# Patient Record
Sex: FEMALE | Race: WHITE | NOT HISPANIC OR LATINO | Employment: FULL TIME | URBAN - METROPOLITAN AREA
[De-identification: names, ages, dates, MRNs, and addresses within clinical notes are randomized per-mention and may not be internally consistent; named-entity substitution may affect disease eponyms.]

---

## 2019-06-24 LAB
HUMAN PAPILLOMAVIRUS (HPV): NORMAL
PAP RECOMMENDATION EXT: NORMAL
PAP SMEAR: NORMAL

## 2019-11-25 LAB — BCS RECOMMENDATION EXT: NORMAL

## 2020-10-21 ENCOUNTER — OFFICE VISIT (OUTPATIENT)
Dept: URGENT CARE | Facility: CLINIC | Age: 55
End: 2020-10-21
Payer: COMMERCIAL

## 2020-10-21 VITALS
SYSTOLIC BLOOD PRESSURE: 118 MMHG | OXYGEN SATURATION: 97 % | HEART RATE: 96 BPM | DIASTOLIC BLOOD PRESSURE: 70 MMHG | WEIGHT: 110 LBS | HEIGHT: 66 IN | TEMPERATURE: 99 F | RESPIRATION RATE: 20 BRPM | BODY MASS INDEX: 17.68 KG/M2

## 2020-10-21 DIAGNOSIS — J18.9 PNEUMONIA DUE TO INFECTIOUS ORGANISM, UNSPECIFIED LATERALITY, UNSPECIFIED PART OF LUNG: ICD-10-CM

## 2020-10-21 DIAGNOSIS — R06.02 SOB (SHORTNESS OF BREATH): Primary | ICD-10-CM

## 2020-10-21 LAB
CTP QC/QA: YES
SARS-COV-2 RDRP RESP QL NAA+PROBE: NEGATIVE

## 2020-10-21 PROCEDURE — U0002: ICD-10-PCS | Mod: QW,S$GLB,, | Performed by: PHYSICIAN ASSISTANT

## 2020-10-21 PROCEDURE — 99203 PR OFFICE/OUTPT VISIT, NEW, LEVL III, 30-44 MIN: ICD-10-PCS | Mod: S$GLB,,, | Performed by: PHYSICIAN ASSISTANT

## 2020-10-21 PROCEDURE — 71046 XR CHEST PA AND LATERAL: ICD-10-PCS | Mod: S$GLB,,, | Performed by: RADIOLOGY

## 2020-10-21 PROCEDURE — U0002 COVID-19 LAB TEST NON-CDC: HCPCS | Mod: QW,S$GLB,, | Performed by: PHYSICIAN ASSISTANT

## 2020-10-21 PROCEDURE — 71046 X-RAY EXAM CHEST 2 VIEWS: CPT | Mod: S$GLB,,, | Performed by: RADIOLOGY

## 2020-10-21 PROCEDURE — 99203 OFFICE O/P NEW LOW 30 MIN: CPT | Mod: S$GLB,,, | Performed by: PHYSICIAN ASSISTANT

## 2020-10-21 RX ORDER — FLUTICASONE PROPIONATE AND SALMETEROL XINAFOATE 230; 21 UG/1; UG/1
2 AEROSOL, METERED RESPIRATORY (INHALATION) 2 TIMES DAILY
COMMUNITY
Start: 2020-09-13 | End: 2022-05-19

## 2020-10-21 RX ORDER — ALBUTEROL SULFATE 90 UG/1
AEROSOL, METERED RESPIRATORY (INHALATION)
COMMUNITY
Start: 2020-08-01 | End: 2021-11-03

## 2020-10-21 RX ORDER — MONTELUKAST SODIUM 10 MG/1
10 TABLET ORAL NIGHTLY
COMMUNITY
Start: 2020-10-20

## 2020-10-21 RX ORDER — ALBUTEROL SULFATE 0.83 MG/ML
SOLUTION RESPIRATORY (INHALATION)
COMMUNITY
Start: 2020-08-19

## 2020-10-21 RX ORDER — NEBULIZER AND COMPRESSOR
EACH MISCELLANEOUS
COMMUNITY
Start: 2020-08-20

## 2020-10-21 RX ORDER — IMMUNE GLOBULIN SUBCUTANEOUS (HUMAN) 200 MG/ML
INJECTION, SOLUTION SUBCUTANEOUS
COMMUNITY

## 2020-10-21 RX ORDER — NORETHINDRONE AND ETHINYL ESTRADIOL 1 MG-35MCG
KIT ORAL
COMMUNITY
Start: 2020-10-08 | End: 2021-07-29

## 2020-10-21 RX ORDER — EPINEPHRINE 0.15 MG/.3ML
0.15 INJECTION INTRAMUSCULAR
COMMUNITY

## 2020-10-21 RX ORDER — FLUTICASONE PROPIONATE 50 MCG
SPRAY, SUSPENSION (ML) NASAL
COMMUNITY
Start: 2020-07-15 | End: 2022-05-19

## 2020-10-21 NOTE — PATIENT INSTRUCTIONS
Shortness of Breath (Dyspnea)  Shortness of breath is the feeling that you can't catch your breath or get enough air. It is also known as dyspnea.  Dyspnea can be caused by many different conditions. They include:  · Acute asthma attack.  · Worsening of chronic lung diseases such as chronic bronchitis and emphysema.  · Heart failure. This is when weak heart muscle allows extra fluid to collect in the lungs.  · Panic attacks or anxiety. Fear can cause rapid breathing (hyperventilation).  · Pneumonia, or an infection in the lung tissue.  · Exposure to toxic substances, fumes, smoke, or certain medicines.  · Blood clot in the lung (pulmonary embolism). This is often from a piece of blood clot in a deep vein of the leg (deep vein thrombosis) that breaks off and travels to the lungs.  · Heart attack or heart-related chest pain (angina).  · Anemia.  · Collapsed lung (pneumothorax).  · Dehydration.  · Pregnancy.  Based on your visit today, the exact cause of your shortness of breath is not certain. Your tests dont show any of the serious causes of dyspnea. You may need other tests to find out if you have a serious problem. Its important to watch for any new symptoms or symptoms that get worse. Follow up with your healthcare provider as directed.  Home care  Follow these tips to take care of yourself at home:  · When your symptoms are better, go back to your usual activities.  · If you smoke, you should stop. Join a quit-smoking program or ask your healthcare provider for help.  · Eat a healthy diet and get plenty of sleep.  · Get regular exercise. Talk with your healthcare provider before starting to exercise, especially if you have other medical problems.  · Cut down on the amount of caffeine and stimulants you consume.  Follow-up care  Follow up with your healthcare provider, or as advised.  If tests were done, you will be told if your treatment needs to be changed. You can call as directed for the results.  (Note:  If an X-ray was taken, a specialist will review it. You will be notified of any new findings that may affect your care.)  Call 911 or get immediate medical care  Shortness of breath may be a sign of a serious medical problem. For example, it may be a problem with your heart or lungs. Call 911 if you have worsening shortness of breath or trouble breathing, especially with any of the symptoms below:  · You are confused or its difficult to wake you.  · You faint or lose consciousness.  · You have a fast heartbeat, or your heartbeat is irregular.  · You are coughing up blood.  · You have pain in your chest, arm, shoulder, neck, or upper back.  · You break out in a sweat.  When to seek medical advice  Call your healthcare provider right away if any of these occur:  · Slight shortness of breath or wheezing  · Redness, pain or swelling in your leg, arm, or other body area  · Swelling in both legs or ankles  · Fast weight gain  · Dizziness or weakness  · Fever of 100.4ºF (38ºC) or higher, or as directed by your healthcare provider  Date Last Reviewed: 9/13/2015  © 6399-0339 [employer]. [address] [place] [address]. All rights reserved. This information is not intended as a substitute for professional medical care. Always follow your healthcare professional's instructions.        What is Pneumonia?    Pneumonia is a serious lung infection. Many cases of pneumonia are caused by bacteria or viruses. Fungi may also cause pneumonia, but this is less common.  You may also get pneumonia after another illness, such as a cold, flu, or bronchitis. Those most at risk include older adults, smokers, and people with long-term (chronic) health problems or weak immune systems.  Healthy lungs  · Air travels in and out of the lungs through tubes called airways.  · The tubes branch into smaller passages called bronchioles. These end in tiny sacs called alveoli.  · Blood vessels surrounding the alveoli take oxygen  into the bloodstream. At the same time, the alveoli remove carbon dioxide (a waste gas) from the blood. The carbon dioxide is then exhaled.  When you have pneumonia  · Pneumonia causes the bronchioles and the alveoli to fill with excess mucus and become inflamed.  · Your bodys response may be to cough. This can help clear out the fluid.  · The fluid (or mucus) you cough up may appear green or dark yellow.  · The excess mucus may make you feel short of breath.  · The inflammation and infection may give you a fever.  What are the symptoms?  Symptoms of pneumonia can come without warning. At first, you may think you have a cold or flu. But symptoms may get worse quickly, turning into pneumonia. Symptoms can be different for bacterial and viral pneumonia. Common symptoms may include the following:  · Severe cough with green or yellow mucus that doesn't improve or that gets worse  · Fever and chills  · Nausea, vomiting or diarrhea  · Shortness of breath with normal daily activities  · Increased heart rate  · Chest pain or discomfort when breathing in or coughing  · Headache  · Excessive sweating and clammy skin  Date Last Reviewed: 12/1/2016  © 3469-6992 [employer]. [address] [place] [address]. All rights reserved. This information is not intended as a substitute for professional medical care. Always follow your healthcare professional's instructions.      Please follow up with your Primary care provider within 2-5 days if your signs and symptoms have not resolved or worsen.     If your condition worsens or fails to improve we recommend that you receive another evaluation at the emergency room immediately or contact your primary medical clinic to discuss your concerns.   You must understand that you have received an Urgent Care treatment only and that you may be released before all of your medical problems are known or treated. You, the patient, will arrange for follow up care as instructed.      RED FLAGS/WARNING SYMPTOMS DISCUSSED WITH PATIENT THAT WOULD WARRANT EMERGENT MEDICAL ATTENTION. PATIENT VERBALIZED UNDERSTANDING.

## 2020-10-21 NOTE — PROGRESS NOTES
"Subjective:       Patient ID: Corrie Calix is a 55 y.o. female.    Vitals:  height is 5' 6" (1.676 m) and weight is 49.9 kg (110 lb). Her temperature is 98.5 °F (36.9 °C). Her blood pressure is 118/70 and her pulse is 96. Her respiration is 20 and oxygen saturation is 97%.     Chief Complaint: Shortness of Breath    Currently Treating pneumonia with doxycyline and levaquin as well as steroids. Got concerned with mild Sob and muscle aches today. Wants to rule out COVID. Reports significant improvement in pneumonia.     Shortness of Breath  This is a new problem. The current episode started 1 to 4 weeks ago (3 weeks). The problem occurs constantly. The problem has been unchanged. Associated symptoms include sputum production. Pertinent negatives include no abdominal pain, chest pain, claudication, coryza, ear pain, fever, headaches, hemoptysis, leg pain, leg swelling, neck pain, orthopnea, PND, rash, rhinorrhea, sore throat, swollen glands, syncope, vomiting or wheezing. The symptoms are aggravated by any activity. She has tried steroid inhalers (antibiotics) for the symptoms. The treatment provided significant relief. Her past medical history is significant for asthma and pneumonia.       Constitution: Positive for fatigue. Negative for chills, sweating and fever.   HENT: Negative for ear pain, congestion, sinus pain, sinus pressure, sore throat and voice change.    Neck: Negative for neck pain and painful lymph nodes.   Cardiovascular: Negative for chest pain, leg swelling and passing out.   Eyes: Negative for eye redness.   Respiratory: Positive for cough, sputum production and shortness of breath. Negative for chest tightness, bloody sputum, COPD, stridor, wheezing and asthma.    Gastrointestinal: Negative for abdominal pain, nausea and vomiting.   Musculoskeletal: Negative for muscle ache.   Skin: Negative for rash.   Allergic/Immunologic: Negative for seasonal allergies and asthma.   Neurological: Negative " for headaches.   Hematologic/Lymphatic: Negative for swollen lymph nodes.       Objective:      Physical Exam   Constitutional: She is oriented to person, place, and time. She appears well-developed. She is cooperative.  Non-toxic appearance. She does not appear ill. No distress.   HENT:   Head: Normocephalic and atraumatic.   Ears:   Right Ear: Hearing, tympanic membrane, external ear and ear canal normal.   Left Ear: Hearing, tympanic membrane, external ear and ear canal normal.   Nose: Nose normal. No mucosal edema, rhinorrhea or nasal deformity. No epistaxis. Right sinus exhibits no maxillary sinus tenderness and no frontal sinus tenderness. Left sinus exhibits no maxillary sinus tenderness and no frontal sinus tenderness.   Mouth/Throat: Uvula is midline, oropharynx is clear and moist and mucous membranes are normal. No trismus in the jaw. Normal dentition. No uvula swelling. No oropharyngeal exudate, posterior oropharyngeal edema or posterior oropharyngeal erythema.   Eyes: Conjunctivae and lids are normal. No scleral icterus.   Neck: Trachea normal, full passive range of motion without pain and phonation normal. Neck supple. No neck rigidity. No edema and no erythema present.   Cardiovascular: Normal rate, regular rhythm, normal heart sounds and normal pulses.   Pulmonary/Chest: Effort normal and breath sounds normal. No accessory muscle usage or stridor. No respiratory distress. She has no decreased breath sounds. She has no wheezes. She has no rhonchi. She has no rales. She exhibits no tenderness and no bony tenderness.   Abdominal: Normal appearance.   Musculoskeletal: Normal range of motion.         General: No deformity.   Lymphadenopathy:        Head (right side): No submental, no submandibular, no tonsillar, no preauricular and no posterior auricular adenopathy present.        Head (left side): No submental, no submandibular, no tonsillar, no preauricular and no posterior auricular adenopathy present.      She has no cervical adenopathy.        Right cervical: No superficial cervical and no deep cervical adenopathy present.       Left cervical: No superficial cervical and no deep cervical adenopathy present.   Neurological: She is alert and oriented to person, place, and time. She exhibits normal muscle tone. Coordination normal.   Skin: Skin is warm, dry, intact, not diaphoretic and not pale. Psychiatric: Her speech is normal and behavior is normal. Judgment and thought content normal.   Nursing note and vitals reviewed.        Assessment:       1. SOB (shortness of breath)    2. Pneumonia due to infectious organism, unspecified laterality, unspecified part of lung        Plan:         SOB (shortness of breath)  -     POCT COVID-19 Rapid Screening  -     X-Ray Chest PA And Lateral; Future; Expected date: 10/21/2020    Pneumonia due to infectious organism, unspecified laterality, unspecified part of lung      Reviewed lab results and radiographs with patient. Advised to follow up with her PCP. Discussed diagnosis with patient as well as treatment and home care. Discussed return to clinic precautions vs ER precautions. All patients questions answered. Patient verbalized understanding. Patient agreed with plan of care.         Shortness of Breath (Dyspnea)  Shortness of breath is the feeling that you can't catch your breath or get enough air. It is also known as dyspnea.  Dyspnea can be caused by many different conditions. They include:  · Acute asthma attack.  · Worsening of chronic lung diseases such as chronic bronchitis and emphysema.  · Heart failure. This is when weak heart muscle allows extra fluid to collect in the lungs.  · Panic attacks or anxiety. Fear can cause rapid breathing (hyperventilation).  · Pneumonia, or an infection in the lung tissue.  · Exposure to toxic substances, fumes, smoke, or certain medicines.  · Blood clot in the lung (pulmonary embolism). This is often from a piece of blood clot in  a deep vein of the leg (deep vein thrombosis) that breaks off and travels to the lungs.  · Heart attack or heart-related chest pain (angina).  · Anemia.  · Collapsed lung (pneumothorax).  · Dehydration.  · Pregnancy.  Based on your visit today, the exact cause of your shortness of breath is not certain. Your tests dont show any of the serious causes of dyspnea. You may need other tests to find out if you have a serious problem. Its important to watch for any new symptoms or symptoms that get worse. Follow up with your healthcare provider as directed.  Home care  Follow these tips to take care of yourself at home:  · When your symptoms are better, go back to your usual activities.  · If you smoke, you should stop. Join a quit-smoking program or ask your healthcare provider for help.  · Eat a healthy diet and get plenty of sleep.  · Get regular exercise. Talk with your healthcare provider before starting to exercise, especially if you have other medical problems.  · Cut down on the amount of caffeine and stimulants you consume.  Follow-up care  Follow up with your healthcare provider, or as advised.  If tests were done, you will be told if your treatment needs to be changed. You can call as directed for the results.  (Note: If an X-ray was taken, a specialist will review it. You will be notified of any new findings that may affect your care.)  Call 911 or get immediate medical care  Shortness of breath may be a sign of a serious medical problem. For example, it may be a problem with your heart or lungs. Call 911 if you have worsening shortness of breath or trouble breathing, especially with any of the symptoms below:  · You are confused or its difficult to wake you.  · You faint or lose consciousness.  · You have a fast heartbeat, or your heartbeat is irregular.  · You are coughing up blood.  · You have pain in your chest, arm, shoulder, neck, or upper back.  · You break out in a sweat.  When to seek medical  advice  Call your healthcare provider right away if any of these occur:  · Slight shortness of breath or wheezing  · Redness, pain or swelling in your leg, arm, or other body area  · Swelling in both legs or ankles  · Fast weight gain  · Dizziness or weakness  · Fever of 100.4ºF (38ºC) or higher, or as directed by your healthcare provider  Date Last Reviewed: 9/13/2015  © 3287-3921 [employer]. [address] [place] [address], [place] [address]. All rights reserved. This information is not intended as a substitute for professional medical care. Always follow your healthcare professional's instructions.        What is Pneumonia?    Pneumonia is a serious lung infection. Many cases of pneumonia are caused by bacteria or viruses. Fungi may also cause pneumonia, but this is less common.  You may also get pneumonia after another illness, such as a cold, flu, or bronchitis. Those most at risk include older adults, smokers, and people with long-term (chronic) health problems or weak immune systems.  Healthy lungs  · Air travels in and out of the lungs through tubes called airways.  · The tubes branch into smaller passages called bronchioles. These end in tiny sacs called alveoli.  · Blood vessels surrounding the alveoli take oxygen into the bloodstream. At the same time, the alveoli remove carbon dioxide (a waste gas) from the blood. The carbon dioxide is then exhaled.  When you have pneumonia  · Pneumonia causes the bronchioles and the alveoli to fill with excess mucus and become inflamed.  · Your bodys response may be to cough. This can help clear out the fluid.  · The fluid (or mucus) you cough up may appear green or dark yellow.  · The excess mucus may make you feel short of breath.  · The inflammation and infection may give you a fever.  What are the symptoms?  Symptoms of pneumonia can come without warning. At first, you may think you have a cold or flu. But symptoms may get worse quickly, turning into  pneumonia. Symptoms can be different for bacterial and viral pneumonia. Common symptoms may include the following:  · Severe cough with green or yellow mucus that doesn't improve or that gets worse  · Fever and chills  · Nausea, vomiting or diarrhea  · Shortness of breath with normal daily activities  · Increased heart rate  · Chest pain or discomfort when breathing in or coughing  · Headache  · Excessive sweating and clammy skin  Date Last Reviewed: 12/1/2016  © 7771-9050 [employer]. [address] [place] [address]. All rights reserved. This information is not intended as a substitute for professional medical care. Always follow your healthcare professional's instructions.      Please follow up with your Primary care provider within 2-5 days if your signs and symptoms have not resolved or worsen.     If your condition worsens or fails to improve we recommend that you receive another evaluation at the emergency room immediately or contact your primary medical clinic to discuss your concerns.   You must understand that you have received an Urgent Care treatment only and that you may be released before all of your medical problems are known or treated. You, the patient, will arrange for follow up care as instructed.     RED FLAGS/WARNING SYMPTOMS DISCUSSED WITH PATIENT THAT WOULD WARRANT EMERGENT MEDICAL ATTENTION. PATIENT VERBALIZED UNDERSTANDING.

## 2021-04-23 ENCOUNTER — TELEPHONE (OUTPATIENT)
Dept: INFECTIOUS DISEASES | Facility: CLINIC | Age: 56
End: 2021-04-23

## 2021-05-06 ENCOUNTER — LAB VISIT (OUTPATIENT)
Dept: LAB | Facility: HOSPITAL | Age: 56
End: 2021-05-06
Attending: PHYSICIAN ASSISTANT
Payer: COMMERCIAL

## 2021-05-06 ENCOUNTER — OFFICE VISIT (OUTPATIENT)
Dept: INFECTIOUS DISEASES | Facility: CLINIC | Age: 56
End: 2021-05-06
Payer: COMMERCIAL

## 2021-05-06 VITALS
SYSTOLIC BLOOD PRESSURE: 129 MMHG | BODY MASS INDEX: 18.49 KG/M2 | HEART RATE: 80 BPM | HEIGHT: 66 IN | TEMPERATURE: 98 F | DIASTOLIC BLOOD PRESSURE: 68 MMHG | WEIGHT: 115.06 LBS

## 2021-05-06 DIAGNOSIS — R05.9 COUGH: ICD-10-CM

## 2021-05-06 DIAGNOSIS — R05.3 CHRONIC COUGH: Primary | ICD-10-CM

## 2021-05-06 DIAGNOSIS — R05.3 CHRONIC COUGH: ICD-10-CM

## 2021-05-06 DIAGNOSIS — J18.9 PNEUMONIA, UNSPECIFIED ORGANISM: ICD-10-CM

## 2021-05-06 PROCEDURE — 87206 SMEAR FLUORESCENT/ACID STAI: CPT | Performed by: PHYSICIAN ASSISTANT

## 2021-05-06 PROCEDURE — 87116 MYCOBACTERIA CULTURE: CPT | Performed by: PHYSICIAN ASSISTANT

## 2021-05-06 PROCEDURE — 99204 PR OFFICE/OUTPT VISIT, NEW, LEVL IV, 45-59 MIN: ICD-10-PCS | Mod: S$GLB,,, | Performed by: PHYSICIAN ASSISTANT

## 2021-05-06 PROCEDURE — 87186 SC STD MICRODIL/AGAR DIL: CPT | Performed by: PHYSICIAN ASSISTANT

## 2021-05-06 PROCEDURE — 99204 OFFICE O/P NEW MOD 45 MIN: CPT | Mod: S$GLB,,, | Performed by: PHYSICIAN ASSISTANT

## 2021-05-06 PROCEDURE — 3008F BODY MASS INDEX DOCD: CPT | Mod: CPTII,S$GLB,, | Performed by: PHYSICIAN ASSISTANT

## 2021-05-06 PROCEDURE — 87077 CULTURE AEROBIC IDENTIFY: CPT | Performed by: PHYSICIAN ASSISTANT

## 2021-05-06 PROCEDURE — 99999 PR PBB SHADOW E&M-EST. PATIENT-LVL IV: ICD-10-PCS | Mod: PBBFAC,,, | Performed by: PHYSICIAN ASSISTANT

## 2021-05-06 PROCEDURE — 1126F PR PAIN SEVERITY QUANTIFIED, NO PAIN PRESENT: ICD-10-PCS | Mod: S$GLB,,, | Performed by: PHYSICIAN ASSISTANT

## 2021-05-06 PROCEDURE — 87556 M.TUBERCULO DNA AMP PROBE: CPT | Performed by: PHYSICIAN ASSISTANT

## 2021-05-06 PROCEDURE — 87205 SMEAR GRAM STAIN: CPT | Performed by: PHYSICIAN ASSISTANT

## 2021-05-06 PROCEDURE — 1126F AMNT PAIN NOTED NONE PRSNT: CPT | Mod: S$GLB,,, | Performed by: PHYSICIAN ASSISTANT

## 2021-05-06 PROCEDURE — 87015 SPECIMEN INFECT AGNT CONCNTJ: CPT | Performed by: PHYSICIAN ASSISTANT

## 2021-05-06 PROCEDURE — 99999 PR PBB SHADOW E&M-EST. PATIENT-LVL IV: CPT | Mod: PBBFAC,,, | Performed by: PHYSICIAN ASSISTANT

## 2021-05-06 PROCEDURE — 3008F PR BODY MASS INDEX (BMI) DOCUMENTED: ICD-10-PCS | Mod: CPTII,S$GLB,, | Performed by: PHYSICIAN ASSISTANT

## 2021-05-06 PROCEDURE — 87070 CULTURE OTHR SPECIMN AEROBIC: CPT | Performed by: PHYSICIAN ASSISTANT

## 2021-05-07 ENCOUNTER — HOSPITAL ENCOUNTER (OUTPATIENT)
Dept: RADIOLOGY | Facility: OTHER | Age: 56
Discharge: HOME | End: 2021-05-07
Attending: PHYSICIAN ASSISTANT
Payer: COMMERCIAL

## 2021-05-07 DIAGNOSIS — R05.9 COUGH: ICD-10-CM

## 2021-05-07 DIAGNOSIS — J18.9 PNEUMONIA, UNSPECIFIED ORGANISM: ICD-10-CM

## 2021-05-07 DIAGNOSIS — R05.3 CHRONIC COUGH: ICD-10-CM

## 2021-05-07 PROCEDURE — 71250 CT CHEST WITHOUT CONTRAST: ICD-10-PCS | Mod: 26,,, | Performed by: RADIOLOGY

## 2021-05-07 PROCEDURE — 71250 CT THORAX DX C-: CPT | Mod: 26,,, | Performed by: RADIOLOGY

## 2021-05-07 PROCEDURE — 71250 CT THORAX DX C-: CPT | Mod: TC

## 2021-05-08 LAB
BACTERIA SPEC AEROBE CULT: ABNORMAL
BACTERIA SPEC AEROBE CULT: ABNORMAL
GRAM STN SPEC: ABNORMAL

## 2021-05-10 ENCOUNTER — PATIENT MESSAGE (OUTPATIENT)
Dept: INFECTIOUS DISEASES | Facility: CLINIC | Age: 56
End: 2021-05-10

## 2021-05-10 DIAGNOSIS — R05.3 CHRONIC COUGH: Primary | ICD-10-CM

## 2021-05-10 LAB
M TB CMPLX DNA SPEC QL NAA+PROBE: NEGATIVE
SPECIMEN SOURCE: NORMAL

## 2021-05-11 ENCOUNTER — PATIENT MESSAGE (OUTPATIENT)
Dept: INFECTIOUS DISEASES | Facility: CLINIC | Age: 56
End: 2021-05-11

## 2021-05-11 ENCOUNTER — TELEPHONE (OUTPATIENT)
Dept: INFECTIOUS DISEASES | Facility: CLINIC | Age: 56
End: 2021-05-11

## 2021-05-11 DIAGNOSIS — J15.1 PNEUMONIA DUE TO PSEUDOMONAS SPECIES, UNSPECIFIED LATERALITY, UNSPECIFIED PART OF LUNG: Primary | ICD-10-CM

## 2021-05-11 RX ORDER — CIPROFLOXACIN 750 MG/1
750 TABLET, FILM COATED ORAL EVERY 12 HOURS
Qty: 14 TABLET | Refills: 0 | Status: SHIPPED | OUTPATIENT
Start: 2021-05-11 | End: 2021-05-19 | Stop reason: SDUPTHER

## 2021-05-12 ENCOUNTER — LAB VISIT (OUTPATIENT)
Dept: LAB | Facility: HOSPITAL | Age: 56
End: 2021-05-12
Attending: PHYSICIAN ASSISTANT
Payer: COMMERCIAL

## 2021-05-12 DIAGNOSIS — R05.3 CHRONIC COUGH: ICD-10-CM

## 2021-05-12 LAB — HIV 1+2 AB+HIV1 P24 AG SERPL QL IA: NEGATIVE

## 2021-05-12 PROCEDURE — 86703 HIV-1/HIV-2 1 RESULT ANTBDY: CPT | Performed by: PHYSICIAN ASSISTANT

## 2021-05-12 PROCEDURE — 86682 HELMINTH ANTIBODY: CPT | Performed by: PHYSICIAN ASSISTANT

## 2021-05-13 LAB — STRONGYLOIDES ANTIBODY IGG: NEGATIVE

## 2021-05-19 DIAGNOSIS — J15.1 PNEUMONIA DUE TO PSEUDOMONAS SPECIES, UNSPECIFIED LATERALITY, UNSPECIFIED PART OF LUNG: ICD-10-CM

## 2021-05-19 RX ORDER — CIPROFLOXACIN 750 MG/1
750 TABLET, FILM COATED ORAL EVERY 12 HOURS
Qty: 14 TABLET | Refills: 0 | Status: SHIPPED | OUTPATIENT
Start: 2021-05-19 | End: 2021-05-26

## 2021-05-24 ENCOUNTER — PATIENT MESSAGE (OUTPATIENT)
Dept: INFECTIOUS DISEASES | Facility: CLINIC | Age: 56
End: 2021-05-24

## 2021-05-26 DIAGNOSIS — B37.9 YEAST INFECTION: Primary | ICD-10-CM

## 2021-05-26 RX ORDER — FLUCONAZOLE 200 MG/1
200 TABLET ORAL DAILY
Qty: 7 TABLET | Refills: 1 | Status: SHIPPED | OUTPATIENT
Start: 2021-05-26 | End: 2021-06-02

## 2021-06-08 ENCOUNTER — PATIENT MESSAGE (OUTPATIENT)
Dept: INFECTIOUS DISEASES | Facility: CLINIC | Age: 56
End: 2021-06-08

## 2021-06-11 ENCOUNTER — TELEPHONE (OUTPATIENT)
Dept: PULMONOLOGY | Facility: CLINIC | Age: 56
End: 2021-06-11

## 2021-06-11 DIAGNOSIS — R05.3 CHRONIC COUGH: Primary | ICD-10-CM

## 2021-06-14 ENCOUNTER — LAB VISIT (OUTPATIENT)
Dept: LAB | Facility: HOSPITAL | Age: 56
End: 2021-06-14
Attending: PHYSICIAN ASSISTANT
Payer: COMMERCIAL

## 2021-06-14 DIAGNOSIS — R05.3 CHRONIC COUGH: ICD-10-CM

## 2021-06-14 LAB
ALBUMIN SERPL BCP-MCNC: 3.5 G/DL (ref 3.5–5.2)
ALP SERPL-CCNC: 42 U/L (ref 55–135)
ALT SERPL W/O P-5'-P-CCNC: 21 U/L (ref 10–44)
ANION GAP SERPL CALC-SCNC: 7 MMOL/L (ref 8–16)
AST SERPL-CCNC: 31 U/L (ref 10–40)
BASOPHILS # BLD AUTO: 0.02 K/UL (ref 0–0.2)
BASOPHILS NFR BLD: 0.3 % (ref 0–1.9)
BILIRUB SERPL-MCNC: 1 MG/DL (ref 0.1–1)
BUN SERPL-MCNC: 15 MG/DL (ref 6–20)
CALCIUM SERPL-MCNC: 8.9 MG/DL (ref 8.7–10.5)
CHLORIDE SERPL-SCNC: 107 MMOL/L (ref 95–110)
CO2 SERPL-SCNC: 25 MMOL/L (ref 23–29)
CREAT SERPL-MCNC: 0.8 MG/DL (ref 0.5–1.4)
CRP SERPL-MCNC: 4.73 MG/L (ref 0–3.19)
DIFFERENTIAL METHOD: ABNORMAL
EOSINOPHIL # BLD AUTO: 0.1 K/UL (ref 0–0.5)
EOSINOPHIL NFR BLD: 1.2 % (ref 0–8)
ERYTHROCYTE [DISTWIDTH] IN BLOOD BY AUTOMATED COUNT: 13.3 % (ref 11.5–14.5)
ERYTHROCYTE [SEDIMENTATION RATE] IN BLOOD BY WESTERGREN METHOD: 3 MM/HR (ref 0–36)
EST. GFR  (AFRICAN AMERICAN): >60 ML/MIN/1.73 M^2
EST. GFR  (NON AFRICAN AMERICAN): >60 ML/MIN/1.73 M^2
GLUCOSE SERPL-MCNC: 98 MG/DL (ref 70–110)
HCT VFR BLD AUTO: 39.5 % (ref 37–48.5)
HGB BLD-MCNC: 12.8 G/DL (ref 12–16)
IMM GRANULOCYTES # BLD AUTO: 0.01 K/UL (ref 0–0.04)
IMM GRANULOCYTES NFR BLD AUTO: 0.2 % (ref 0–0.5)
LYMPHOCYTES # BLD AUTO: 1.8 K/UL (ref 1–4.8)
LYMPHOCYTES NFR BLD: 30.2 % (ref 18–48)
MCH RBC QN AUTO: 31.7 PG (ref 27–31)
MCHC RBC AUTO-ENTMCNC: 32.4 G/DL (ref 32–36)
MCV RBC AUTO: 98 FL (ref 82–98)
MONOCYTES # BLD AUTO: 0.5 K/UL (ref 0.3–1)
MONOCYTES NFR BLD: 8.9 % (ref 4–15)
NEUTROPHILS # BLD AUTO: 3.5 K/UL (ref 1.8–7.7)
NEUTROPHILS NFR BLD: 59.2 % (ref 38–73)
NRBC BLD-RTO: 0 /100 WBC
PLATELET # BLD AUTO: 159 K/UL (ref 150–450)
PMV BLD AUTO: 12.5 FL (ref 9.2–12.9)
POTASSIUM SERPL-SCNC: 4 MMOL/L (ref 3.5–5.1)
PROT SERPL-MCNC: 7 G/DL (ref 6–8.4)
RBC # BLD AUTO: 4.04 M/UL (ref 4–5.4)
SODIUM SERPL-SCNC: 139 MMOL/L (ref 136–145)
WBC # BLD AUTO: 5.83 K/UL (ref 3.9–12.7)

## 2021-06-14 PROCEDURE — 85652 RBC SED RATE AUTOMATED: CPT | Performed by: PHYSICIAN ASSISTANT

## 2021-06-14 PROCEDURE — 85025 COMPLETE CBC W/AUTO DIFF WBC: CPT | Performed by: PHYSICIAN ASSISTANT

## 2021-06-14 PROCEDURE — 86141 C-REACTIVE PROTEIN HS: CPT | Performed by: PHYSICIAN ASSISTANT

## 2021-06-14 PROCEDURE — 36415 COLL VENOUS BLD VENIPUNCTURE: CPT | Performed by: PHYSICIAN ASSISTANT

## 2021-06-14 PROCEDURE — 80053 COMPREHEN METABOLIC PANEL: CPT | Performed by: PHYSICIAN ASSISTANT

## 2021-06-21 ENCOUNTER — HOSPITAL ENCOUNTER (OUTPATIENT)
Dept: PULMONOLOGY | Facility: CLINIC | Age: 56
Discharge: HOME | End: 2021-06-21
Payer: COMMERCIAL

## 2021-06-21 ENCOUNTER — OFFICE VISIT (OUTPATIENT)
Dept: PULMONOLOGY | Facility: CLINIC | Age: 56
End: 2021-06-21
Payer: COMMERCIAL

## 2021-06-21 VITALS
OXYGEN SATURATION: 98 % | DIASTOLIC BLOOD PRESSURE: 62 MMHG | HEART RATE: 89 BPM | HEIGHT: 66 IN | WEIGHT: 114.88 LBS | BODY MASS INDEX: 18.46 KG/M2 | SYSTOLIC BLOOD PRESSURE: 130 MMHG

## 2021-06-21 DIAGNOSIS — R05.3 CHRONIC COUGH: ICD-10-CM

## 2021-06-21 DIAGNOSIS — J18.1 LUNG CONSOLIDATION: ICD-10-CM

## 2021-06-21 DIAGNOSIS — J18.9 PNEUMONIA, UNSPECIFIED ORGANISM: ICD-10-CM

## 2021-06-21 PROCEDURE — 3008F BODY MASS INDEX DOCD: CPT | Mod: CPTII,S$GLB,, | Performed by: INTERNAL MEDICINE

## 2021-06-21 PROCEDURE — 99204 PR OFFICE/OUTPT VISIT, NEW, LEVL IV, 45-59 MIN: ICD-10-PCS | Mod: S$GLB,,, | Performed by: INTERNAL MEDICINE

## 2021-06-21 PROCEDURE — 1126F PR PAIN SEVERITY QUANTIFIED, NO PAIN PRESENT: ICD-10-PCS | Mod: S$GLB,,, | Performed by: INTERNAL MEDICINE

## 2021-06-21 PROCEDURE — 94729 PR C02/MEMBANE DIFFUSE CAPACITY: ICD-10-PCS | Mod: S$GLB,,, | Performed by: INTERNAL MEDICINE

## 2021-06-21 PROCEDURE — 1126F AMNT PAIN NOTED NONE PRSNT: CPT | Mod: S$GLB,,, | Performed by: INTERNAL MEDICINE

## 2021-06-21 PROCEDURE — 99999 PR PBB SHADOW E&M-EST. PATIENT-LVL IV: CPT | Mod: PBBFAC,,, | Performed by: INTERNAL MEDICINE

## 2021-06-21 PROCEDURE — 94060 EVALUATION OF WHEEZING: CPT | Mod: S$GLB,,, | Performed by: INTERNAL MEDICINE

## 2021-06-21 PROCEDURE — 99999 PR PBB SHADOW E&M-EST. PATIENT-LVL IV: ICD-10-PCS | Mod: PBBFAC,,, | Performed by: INTERNAL MEDICINE

## 2021-06-21 PROCEDURE — 94729 DIFFUSING CAPACITY: CPT | Mod: S$GLB,,, | Performed by: INTERNAL MEDICINE

## 2021-06-21 PROCEDURE — 94060 PR EVAL OF BRONCHOSPASM: ICD-10-PCS | Mod: S$GLB,,, | Performed by: INTERNAL MEDICINE

## 2021-06-21 PROCEDURE — 99204 OFFICE O/P NEW MOD 45 MIN: CPT | Mod: S$GLB,,, | Performed by: INTERNAL MEDICINE

## 2021-06-21 PROCEDURE — 94727 PR PULM FUNCTION TEST BY GAS: ICD-10-PCS | Mod: S$GLB,,, | Performed by: INTERNAL MEDICINE

## 2021-06-21 PROCEDURE — 3008F PR BODY MASS INDEX (BMI) DOCUMENTED: ICD-10-PCS | Mod: CPTII,S$GLB,, | Performed by: INTERNAL MEDICINE

## 2021-06-21 PROCEDURE — 94727 GAS DIL/WSHOT DETER LNG VOL: CPT | Mod: S$GLB,,, | Performed by: INTERNAL MEDICINE

## 2021-06-21 RX ORDER — DUPILUMAB 300 MG/2ML
300 INJECTION, SOLUTION SUBCUTANEOUS
COMMUNITY

## 2021-06-23 ENCOUNTER — HOSPITAL ENCOUNTER (OUTPATIENT)
Dept: RADIOLOGY | Facility: HOSPITAL | Age: 56
Discharge: HOME | End: 2021-06-23
Attending: INTERNAL MEDICINE
Payer: COMMERCIAL

## 2021-06-23 DIAGNOSIS — J18.9 PNEUMONIA, UNSPECIFIED ORGANISM: ICD-10-CM

## 2021-06-23 PROCEDURE — 71250 CT THORAX DX C-: CPT | Mod: 26,,, | Performed by: RADIOLOGY

## 2021-06-23 PROCEDURE — 71250 CT CHEST WITHOUT CONTRAST: ICD-10-PCS | Mod: 26,,, | Performed by: RADIOLOGY

## 2021-06-23 PROCEDURE — 71250 CT THORAX DX C-: CPT | Mod: TC

## 2021-06-25 DIAGNOSIS — J18.1 LUNG CONSOLIDATION: Primary | ICD-10-CM

## 2021-06-28 ENCOUNTER — TELEPHONE (OUTPATIENT)
Dept: PULMONOLOGY | Facility: CLINIC | Age: 56
End: 2021-06-28

## 2021-06-29 ENCOUNTER — HOSPITAL ENCOUNTER (OUTPATIENT)
Facility: HOSPITAL | Age: 56
Discharge: HOME | End: 2021-06-29
Attending: INTERNAL MEDICINE | Admitting: INTERNAL MEDICINE
Payer: COMMERCIAL

## 2021-06-29 VITALS
SYSTOLIC BLOOD PRESSURE: 135 MMHG | HEIGHT: 66 IN | WEIGHT: 114 LBS | DIASTOLIC BLOOD PRESSURE: 65 MMHG | HEART RATE: 79 BPM | TEMPERATURE: 98 F | BODY MASS INDEX: 18.32 KG/M2 | OXYGEN SATURATION: 96 % | RESPIRATION RATE: 21 BRPM

## 2021-06-29 DIAGNOSIS — J18.1 LUNG CONSOLIDATION: ICD-10-CM

## 2021-06-29 DIAGNOSIS — R05.3 CHRONIC COUGH: Primary | ICD-10-CM

## 2021-06-29 DIAGNOSIS — Z01.818 PRE-PROCEDURAL EXAMINATION: ICD-10-CM

## 2021-06-29 PROCEDURE — 87206 SMEAR FLUORESCENT/ACID STAI: CPT | Performed by: INTERNAL MEDICINE

## 2021-06-29 PROCEDURE — 87107 FUNGI IDENTIFICATION MOLD: CPT | Performed by: INTERNAL MEDICINE

## 2021-06-29 PROCEDURE — 63600175 PHARM REV CODE 636 W HCPCS: Performed by: INTERNAL MEDICINE

## 2021-06-29 PROCEDURE — 99152 MOD SED SAME PHYS/QHP 5/>YRS: CPT | Performed by: INTERNAL MEDICINE

## 2021-06-29 PROCEDURE — 25000003 PHARM REV CODE 250: Performed by: INTERNAL MEDICINE

## 2021-06-29 PROCEDURE — 87070 CULTURE OTHR SPECIMN AEROBIC: CPT | Performed by: INTERNAL MEDICINE

## 2021-06-29 PROCEDURE — 87116 MYCOBACTERIA CULTURE: CPT | Performed by: INTERNAL MEDICINE

## 2021-06-29 PROCEDURE — 31622 DX BRONCHOSCOPE/WASH: CPT | Performed by: INTERNAL MEDICINE

## 2021-06-29 PROCEDURE — 87186 SC STD MICRODIL/AGAR DIL: CPT | Performed by: INTERNAL MEDICINE

## 2021-06-29 PROCEDURE — 87205 SMEAR GRAM STAIN: CPT | Performed by: INTERNAL MEDICINE

## 2021-06-29 PROCEDURE — 99153 MOD SED SAME PHYS/QHP EA: CPT | Performed by: INTERNAL MEDICINE

## 2021-06-29 PROCEDURE — 31624 DX BRONCHOSCOPE/LAVAGE: CPT | Performed by: INTERNAL MEDICINE

## 2021-06-29 PROCEDURE — 87015 SPECIMEN INFECT AGNT CONCNTJ: CPT | Performed by: INTERNAL MEDICINE

## 2021-06-29 PROCEDURE — 87102 FUNGUS ISOLATION CULTURE: CPT | Performed by: INTERNAL MEDICINE

## 2021-06-29 PROCEDURE — 87077 CULTURE AEROBIC IDENTIFY: CPT | Performed by: INTERNAL MEDICINE

## 2021-06-29 RX ORDER — LIDOCAINE HYDROCHLORIDE 10 MG/ML
INJECTION INFILTRATION; PERINEURAL CODE/TRAUMA/SEDATION MEDICATION
Status: COMPLETED | OUTPATIENT
Start: 2021-06-29 | End: 2021-06-29

## 2021-06-29 RX ORDER — FENTANYL CITRATE 50 UG/ML
INJECTION, SOLUTION INTRAMUSCULAR; INTRAVENOUS CODE/TRAUMA/SEDATION MEDICATION
Status: COMPLETED | OUTPATIENT
Start: 2021-06-29 | End: 2021-06-29

## 2021-06-29 RX ORDER — CETIRIZINE HYDROCHLORIDE 10 MG/1
10 TABLET ORAL DAILY
COMMUNITY

## 2021-06-29 RX ORDER — LIDOCAINE HYDROCHLORIDE 20 MG/ML
INJECTION, SOLUTION INFILTRATION; PERINEURAL CODE/TRAUMA/SEDATION MEDICATION
Status: COMPLETED | OUTPATIENT
Start: 2021-06-29 | End: 2021-06-29

## 2021-06-29 RX ORDER — MIDAZOLAM HYDROCHLORIDE 5 MG/ML
INJECTION INTRAMUSCULAR; INTRAVENOUS CODE/TRAUMA/SEDATION MEDICATION
Status: COMPLETED | OUTPATIENT
Start: 2021-06-29 | End: 2021-06-29

## 2021-06-29 RX ADMIN — FENTANYL CITRATE 25 MCG: 50 INJECTION, SOLUTION INTRAMUSCULAR; INTRAVENOUS at 10:06

## 2021-06-29 RX ADMIN — LIDOCAINE HYDROCHLORIDE 8 ML: 10 INJECTION, SOLUTION INFILTRATION; PERINEURAL at 10:06

## 2021-06-29 RX ADMIN — MIDAZOLAM 1 MG: 5 INJECTION INTRAMUSCULAR; INTRAVENOUS at 10:06

## 2021-06-29 RX ADMIN — LIDOCAINE HYDROCHLORIDE 6 ML: 20 INJECTION, SOLUTION INFILTRATION; PERINEURAL at 10:06

## 2021-06-29 RX ADMIN — TOPICAL ANESTHETIC 0.5 ML: 200 SPRAY DENTAL; PERIODONTAL at 10:06

## 2021-06-29 RX ADMIN — MIDAZOLAM 2 MG: 5 INJECTION INTRAMUSCULAR; INTRAVENOUS at 10:06

## 2021-07-02 LAB
BACTERIA SPEC AEROBE CULT: ABNORMAL
BACTERIA SPEC AEROBE CULT: ABNORMAL
GRAM STN SPEC: ABNORMAL

## 2021-07-03 RX ORDER — CIPROFLOXACIN 500 MG/1
500 TABLET ORAL EVERY 12 HOURS
Qty: 14 TABLET | Refills: 0 | Status: SHIPPED | OUTPATIENT
Start: 2021-07-03 | End: 2021-07-29 | Stop reason: ALTCHOICE

## 2021-07-07 ENCOUNTER — OFFICE VISIT (OUTPATIENT)
Dept: OTOLARYNGOLOGY | Facility: CLINIC | Age: 56
End: 2021-07-07
Payer: COMMERCIAL

## 2021-07-07 VITALS — WEIGHT: 112.19 LBS | HEIGHT: 67 IN | BODY MASS INDEX: 17.61 KG/M2

## 2021-07-07 DIAGNOSIS — J45.40 MODERATE PERSISTENT ASTHMA WITHOUT COMPLICATION: ICD-10-CM

## 2021-07-07 DIAGNOSIS — J30.89 PERENNIAL ALLERGIC RHINITIS WITH SEASONAL VARIATION: Primary | ICD-10-CM

## 2021-07-07 DIAGNOSIS — J32.8 OTHER CHRONIC SINUSITIS: ICD-10-CM

## 2021-07-07 DIAGNOSIS — D80.6 ANTI-POLYSACCHARIDE ANTIBODY DEFICIENCY: ICD-10-CM

## 2021-07-07 DIAGNOSIS — J30.2 PERENNIAL ALLERGIC RHINITIS WITH SEASONAL VARIATION: Primary | ICD-10-CM

## 2021-07-07 PROCEDURE — 99203 PR OFFICE/OUTPT VISIT, NEW, LEVL III, 30-44 MIN: ICD-10-PCS | Mod: 25,S$GLB,, | Performed by: OTOLARYNGOLOGY

## 2021-07-07 PROCEDURE — 99999 PR PBB SHADOW E&M-EST. PATIENT-LVL IV: CPT | Mod: PBBFAC,,, | Performed by: OTOLARYNGOLOGY

## 2021-07-07 PROCEDURE — 1126F PR PAIN SEVERITY QUANTIFIED, NO PAIN PRESENT: ICD-10-PCS | Mod: S$GLB,,, | Performed by: OTOLARYNGOLOGY

## 2021-07-07 PROCEDURE — 3008F BODY MASS INDEX DOCD: CPT | Mod: CPTII,S$GLB,, | Performed by: OTOLARYNGOLOGY

## 2021-07-07 PROCEDURE — 31231 NASAL ENDOSCOPY DX: CPT | Mod: S$GLB,,, | Performed by: OTOLARYNGOLOGY

## 2021-07-07 PROCEDURE — 99999 PR PBB SHADOW E&M-EST. PATIENT-LVL IV: ICD-10-PCS | Mod: PBBFAC,,, | Performed by: OTOLARYNGOLOGY

## 2021-07-07 PROCEDURE — 99203 OFFICE O/P NEW LOW 30 MIN: CPT | Mod: 25,S$GLB,, | Performed by: OTOLARYNGOLOGY

## 2021-07-07 PROCEDURE — 31231 NASAL/SINUS ENDOSCOPY: ICD-10-PCS | Mod: S$GLB,,, | Performed by: OTOLARYNGOLOGY

## 2021-07-07 PROCEDURE — 1126F AMNT PAIN NOTED NONE PRSNT: CPT | Mod: S$GLB,,, | Performed by: OTOLARYNGOLOGY

## 2021-07-07 PROCEDURE — 3008F PR BODY MASS INDEX (BMI) DOCUMENTED: ICD-10-PCS | Mod: CPTII,S$GLB,, | Performed by: OTOLARYNGOLOGY

## 2021-07-08 LAB
ACID FAST MOD KINY STN SPEC: NORMAL
MYCOBACTERIUM SPEC QL CULT: NORMAL

## 2021-07-09 ENCOUNTER — PATIENT MESSAGE (OUTPATIENT)
Dept: INFECTIOUS DISEASES | Facility: CLINIC | Age: 56
End: 2021-07-09

## 2021-07-16 ENCOUNTER — SPECIALTY PHARMACY (OUTPATIENT)
Dept: PHARMACY | Facility: CLINIC | Age: 56
End: 2021-07-16

## 2021-07-16 ENCOUNTER — OFFICE VISIT (OUTPATIENT)
Dept: INFECTIOUS DISEASES | Facility: CLINIC | Age: 56
End: 2021-07-16
Payer: COMMERCIAL

## 2021-07-16 VITALS
WEIGHT: 114 LBS | HEIGHT: 67 IN | HEART RATE: 79 BPM | SYSTOLIC BLOOD PRESSURE: 130 MMHG | DIASTOLIC BLOOD PRESSURE: 75 MMHG | BODY MASS INDEX: 17.89 KG/M2 | TEMPERATURE: 98 F

## 2021-07-16 DIAGNOSIS — J47.9 ADULT BRONCHIECTASIS: Primary | ICD-10-CM

## 2021-07-16 PROCEDURE — 3008F PR BODY MASS INDEX (BMI) DOCUMENTED: ICD-10-PCS | Mod: CPTII,S$GLB,, | Performed by: INTERNAL MEDICINE

## 2021-07-16 PROCEDURE — 99215 OFFICE O/P EST HI 40 MIN: CPT | Mod: S$GLB,,, | Performed by: INTERNAL MEDICINE

## 2021-07-16 PROCEDURE — 99999 PR PBB SHADOW E&M-EST. PATIENT-LVL IV: CPT | Mod: PBBFAC,,, | Performed by: INTERNAL MEDICINE

## 2021-07-16 PROCEDURE — 99999 PR PBB SHADOW E&M-EST. PATIENT-LVL IV: ICD-10-PCS | Mod: PBBFAC,,, | Performed by: INTERNAL MEDICINE

## 2021-07-16 PROCEDURE — 99417 PROLNG OP E/M EACH 15 MIN: CPT | Mod: S$GLB,,, | Performed by: INTERNAL MEDICINE

## 2021-07-16 PROCEDURE — 99417 PR PROLONGED SVC, OUTPT, W/WO DIRECT PT CONTACT,  EA ADDTL 15 MIN: ICD-10-PCS | Mod: S$GLB,,, | Performed by: INTERNAL MEDICINE

## 2021-07-16 PROCEDURE — 99215 PR OFFICE/OUTPT VISIT, EST, LEVL V, 40-54 MIN: ICD-10-PCS | Mod: S$GLB,,, | Performed by: INTERNAL MEDICINE

## 2021-07-16 PROCEDURE — 1126F PR PAIN SEVERITY QUANTIFIED, NO PAIN PRESENT: ICD-10-PCS | Mod: S$GLB,,, | Performed by: INTERNAL MEDICINE

## 2021-07-16 PROCEDURE — 1126F AMNT PAIN NOTED NONE PRSNT: CPT | Mod: S$GLB,,, | Performed by: INTERNAL MEDICINE

## 2021-07-16 PROCEDURE — 3008F BODY MASS INDEX DOCD: CPT | Mod: CPTII,S$GLB,, | Performed by: INTERNAL MEDICINE

## 2021-07-16 RX ORDER — TOBRAMYCIN INHALATION SOLUTION 300 MG/5ML
300 INHALANT RESPIRATORY (INHALATION) EVERY 12 HOURS
Qty: 300 ML | Refills: 11 | Status: SHIPPED | OUTPATIENT
Start: 2021-07-16 | End: 2021-09-15

## 2021-07-29 ENCOUNTER — SPECIALTY PHARMACY (OUTPATIENT)
Dept: PHARMACY | Facility: CLINIC | Age: 56
End: 2021-07-29

## 2021-07-29 DIAGNOSIS — J47.9 ADULT BRONCHIECTASIS: Primary | ICD-10-CM

## 2021-08-02 ENCOUNTER — PATIENT MESSAGE (OUTPATIENT)
Dept: INFECTIOUS DISEASES | Facility: CLINIC | Age: 56
End: 2021-08-02

## 2021-08-02 LAB
FUNGUS SPEC CULT: ABNORMAL
FUNGUS SPEC CULT: ABNORMAL

## 2021-08-09 LAB
ACID FAST MOD KINY STN SPEC: NORMAL
MYCOBACTERIUM SPEC QL CULT: NORMAL

## 2021-08-10 ENCOUNTER — PATIENT MESSAGE (OUTPATIENT)
Dept: INFECTIOUS DISEASES | Facility: CLINIC | Age: 56
End: 2021-08-10

## 2021-08-10 DIAGNOSIS — J47.9 ADULT BRONCHIECTASIS: Primary | ICD-10-CM

## 2021-08-11 RX ORDER — SODIUM CHLORIDE FOR INHALATION 3 %
4 VIAL, NEBULIZER (ML) INHALATION 2 TIMES DAILY
Qty: 240 ML | Refills: 3 | Status: SHIPPED | OUTPATIENT
Start: 2021-08-11 | End: 2022-04-29 | Stop reason: SDUPTHER

## 2021-08-25 ENCOUNTER — TELEPHONE (OUTPATIENT)
Dept: INFECTIOUS DISEASES | Facility: CLINIC | Age: 56
End: 2021-08-25

## 2021-08-25 ENCOUNTER — OFFICE VISIT (OUTPATIENT)
Dept: INFECTIOUS DISEASES | Facility: CLINIC | Age: 56
End: 2021-08-25
Payer: COMMERCIAL

## 2021-08-25 DIAGNOSIS — J47.9 ADULT BRONCHIECTASIS: Primary | ICD-10-CM

## 2021-08-25 PROCEDURE — 99214 PR OFFICE/OUTPT VISIT, EST, LEVL IV, 30-39 MIN: ICD-10-PCS | Mod: 95,,, | Performed by: INTERNAL MEDICINE

## 2021-08-25 PROCEDURE — 99214 OFFICE O/P EST MOD 30 MIN: CPT | Mod: 95,,, | Performed by: INTERNAL MEDICINE

## 2021-09-13 ENCOUNTER — SPECIALTY PHARMACY (OUTPATIENT)
Dept: PHARMACY | Facility: CLINIC | Age: 56
End: 2021-09-13

## 2021-09-13 ENCOUNTER — PATIENT MESSAGE (OUTPATIENT)
Dept: PULMONOLOGY | Facility: CLINIC | Age: 56
End: 2021-09-13

## 2021-09-13 ENCOUNTER — PATIENT MESSAGE (OUTPATIENT)
Dept: INFECTIOUS DISEASES | Facility: CLINIC | Age: 56
End: 2021-09-13

## 2021-09-13 DIAGNOSIS — J47.9 ADULT BRONCHIECTASIS: Primary | ICD-10-CM

## 2021-09-13 DIAGNOSIS — J47.9 ADULT BRONCHIECTASIS: ICD-10-CM

## 2021-09-15 ENCOUNTER — PATIENT MESSAGE (OUTPATIENT)
Dept: INFECTIOUS DISEASES | Facility: CLINIC | Age: 56
End: 2021-09-15

## 2021-09-15 RX ORDER — TOBRAMYCIN INHALATION SOLUTION 300 MG/5ML
300 INHALANT RESPIRATORY (INHALATION) 2 TIMES DAILY
Qty: 300 ML | Refills: 5 | Status: SHIPPED | OUTPATIENT
Start: 2021-09-15 | End: 2021-11-09

## 2021-10-01 ENCOUNTER — PATIENT MESSAGE (OUTPATIENT)
Dept: PULMONOLOGY | Facility: CLINIC | Age: 56
End: 2021-10-01

## 2021-10-04 ENCOUNTER — IMMUNIZATION (OUTPATIENT)
Dept: PHARMACY | Facility: CLINIC | Age: 56
End: 2021-10-04
Payer: COMMERCIAL

## 2021-10-04 DIAGNOSIS — Z23 NEED FOR VACCINATION: Primary | ICD-10-CM

## 2021-10-13 ENCOUNTER — PATIENT MESSAGE (OUTPATIENT)
Dept: INFECTIOUS DISEASES | Facility: CLINIC | Age: 56
End: 2021-10-13

## 2021-10-25 ENCOUNTER — PATIENT MESSAGE (OUTPATIENT)
Dept: PULMONOLOGY | Facility: CLINIC | Age: 56
End: 2021-10-25
Payer: COMMERCIAL

## 2021-11-02 ENCOUNTER — OFFICE VISIT (OUTPATIENT)
Dept: INFECTIOUS DISEASES | Facility: CLINIC | Age: 56
End: 2021-11-02
Payer: COMMERCIAL

## 2021-11-02 DIAGNOSIS — J47.9 ADULT BRONCHIECTASIS: Primary | ICD-10-CM

## 2021-11-02 PROCEDURE — 99215 OFFICE O/P EST HI 40 MIN: CPT | Mod: 95,,, | Performed by: INTERNAL MEDICINE

## 2021-11-02 PROCEDURE — 99215 PR OFFICE/OUTPT VISIT, EST, LEVL V, 40-54 MIN: ICD-10-PCS | Mod: 95,,, | Performed by: INTERNAL MEDICINE

## 2021-11-09 ENCOUNTER — SPECIALTY PHARMACY (OUTPATIENT)
Dept: PHARMACY | Facility: CLINIC | Age: 56
End: 2021-11-09
Payer: COMMERCIAL

## 2021-11-09 ENCOUNTER — PATIENT MESSAGE (OUTPATIENT)
Dept: PHARMACY | Facility: CLINIC | Age: 56
End: 2021-11-09
Payer: COMMERCIAL

## 2021-11-09 DIAGNOSIS — J47.9 ADULT BRONCHIECTASIS: Primary | ICD-10-CM

## 2021-11-09 RX ORDER — TOBRAMYCIN INHALATION SOLUTION 300 MG/5ML
300 INHALANT RESPIRATORY (INHALATION) EVERY 12 HOURS
Qty: 140 ML | Refills: 6 | Status: SHIPPED | OUTPATIENT
Start: 2021-11-09 | End: 2022-03-04 | Stop reason: SDUPTHER

## 2021-11-18 ENCOUNTER — PATIENT MESSAGE (OUTPATIENT)
Dept: PULMONOLOGY | Facility: CLINIC | Age: 56
End: 2021-11-18

## 2021-11-18 ENCOUNTER — PATIENT MESSAGE (OUTPATIENT)
Dept: PULMONOLOGY | Facility: CLINIC | Age: 56
End: 2021-11-18
Payer: COMMERCIAL

## 2021-11-22 ENCOUNTER — SPECIALTY PHARMACY (OUTPATIENT)
Dept: PHARMACY | Facility: CLINIC | Age: 56
End: 2021-11-22
Payer: COMMERCIAL

## 2021-12-02 ENCOUNTER — PATIENT MESSAGE (OUTPATIENT)
Dept: INFECTIOUS DISEASES | Facility: CLINIC | Age: 56
End: 2021-12-02
Payer: COMMERCIAL

## 2021-12-02 ENCOUNTER — PATIENT MESSAGE (OUTPATIENT)
Dept: PULMONOLOGY | Facility: CLINIC | Age: 56
End: 2021-12-02
Payer: COMMERCIAL

## 2021-12-06 ENCOUNTER — PATIENT MESSAGE (OUTPATIENT)
Dept: INFECTIOUS DISEASES | Facility: CLINIC | Age: 56
End: 2021-12-06
Payer: COMMERCIAL

## 2021-12-06 ENCOUNTER — TELEPHONE (OUTPATIENT)
Dept: PULMONOLOGY | Facility: CLINIC | Age: 56
End: 2021-12-06
Payer: COMMERCIAL

## 2021-12-07 ENCOUNTER — PATIENT MESSAGE (OUTPATIENT)
Dept: PULMONOLOGY | Facility: CLINIC | Age: 56
End: 2021-12-07
Payer: COMMERCIAL

## 2021-12-17 ENCOUNTER — SPECIALTY PHARMACY (OUTPATIENT)
Dept: PHARMACY | Facility: CLINIC | Age: 56
End: 2021-12-17
Payer: COMMERCIAL

## 2021-12-21 ENCOUNTER — PATIENT MESSAGE (OUTPATIENT)
Dept: PHARMACY | Facility: CLINIC | Age: 56
End: 2021-12-21
Payer: COMMERCIAL

## 2021-12-21 ENCOUNTER — SPECIALTY PHARMACY (OUTPATIENT)
Dept: PHARMACY | Facility: CLINIC | Age: 56
End: 2021-12-21
Payer: COMMERCIAL

## 2021-12-28 ENCOUNTER — PATIENT MESSAGE (OUTPATIENT)
Dept: PHARMACY | Facility: CLINIC | Age: 56
End: 2021-12-28
Payer: COMMERCIAL

## 2022-01-12 ENCOUNTER — PATIENT MESSAGE (OUTPATIENT)
Dept: INFECTIOUS DISEASES | Facility: CLINIC | Age: 57
End: 2022-01-12
Payer: COMMERCIAL

## 2022-01-12 DIAGNOSIS — J47.9 ADULT BRONCHIECTASIS: Primary | ICD-10-CM

## 2022-01-13 ENCOUNTER — PATIENT MESSAGE (OUTPATIENT)
Dept: INFECTIOUS DISEASES | Facility: CLINIC | Age: 57
End: 2022-01-13
Payer: COMMERCIAL

## 2022-01-27 ENCOUNTER — PATIENT MESSAGE (OUTPATIENT)
Dept: PHARMACY | Facility: CLINIC | Age: 57
End: 2022-01-27
Payer: COMMERCIAL

## 2022-01-28 ENCOUNTER — SPECIALTY PHARMACY (OUTPATIENT)
Dept: PHARMACY | Facility: CLINIC | Age: 57
End: 2022-01-28
Payer: COMMERCIAL

## 2022-01-28 ENCOUNTER — PATIENT MESSAGE (OUTPATIENT)
Dept: INFECTIOUS DISEASES | Facility: CLINIC | Age: 57
End: 2022-01-28
Payer: COMMERCIAL

## 2022-01-28 NOTE — TELEPHONE ENCOUNTER
Incoming call from patient returning [facility]'s call. Informed pt of the below information and pt grants permission to proceed with urgent appeal.

## 2022-01-28 NOTE — TELEPHONE ENCOUNTER
Received DENIAL of tobramycin inhalation solution. Plan only covers for cystic fibrosis diagnosis. Spoke to rep (Ericka call ref# [id number]) to determine next option. She informed me P2P is not an option in this case, must proceed with urgent appeal. Spoke to patient to discuss appeal process- NA, LVM to return call. Notified prescriber via secure chat. Upon pt callback, obtain pt permission to proceed with urgent appeal.

## 2022-01-28 NOTE — TELEPHONE ENCOUNTER
Patient called for refill of tobramycin. She has switched insurance. Tobramycin will now require a PA. Pt states she will need a refill by Tuesday 02/01. Routing to assigned [facility] for review.

## 2022-01-28 NOTE — LETTER
[facility]  [address]  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 The dose prescribed is inhale 300 mg (5 mL) into the lungs via nebulizer every 12 hours. Of note, this is a CONTINUATION OF THERAPY request. Her previous insurance company, [employer], had approved the medication for the same indication. [facility] has denied the initial prior authorization request ([id number]) and first-level appeal. We consider this to be a RESCISSION OF COVERAGE and are requesting an EXPEDITED, second-level external appeal.     This letter provides the clinical history, treatment rationale, and other documents that support the use of inhaled Tobramycin. Corrie Calix is a 56-year-old female with Pseudomonas pneumonia, anti-polysaccharide immune deficiency, and chronic, persistent, and productive cough with thick purulent secretions. She had two hospital admissions since 2020 for pneumonia. She was treated with IV antibiotics followed by 10-day course of Cipro, and later, a 10-day course of Levaquin. She denied resolution of chronic cough with productive sputum despite various treatments. Please see HPI of the pulmonologists note for remainder of her clinical history. She is followed closely by pulmonology, and has been evaluated by allergy, ENT, and ID.    Her cultures returned  positive with Pseudomonas in February 2021, and her respiratory culture again resulted positive for Pseudomonas on 6/29/21. Updated cultures are pending. Given her failure with past treatments, Pseudomonas colonization, and decreased lung function, she met criteria for an inhaled antibiotic. She initiated tobramycin nebulization in July 2021. She first began 4 weeks on, 4 weeks off. Frequency was later changed to 14 days on, 14 days off due to patients tolerability. Tobramycin nebulization has greatly improved her symptoms, and with the treatment she now denies any systemic symptoms and is otherwise doing well.    According to the official  labeling, TOBRAMYCIN is an aminoglycoside antibiotic that interferes with bacterial protein synthesis by binding to 30S ribosomal subunit, resulting in a defective bacterial cell membrane. Tobramycin is often used for non-cystic fibrosis bronchiectasis. Inhaled tobramycin decreases bacterial burden and decreases chronic inflammation, and therapy is crucial to combat Ms. Keith lung infection and associated symptoms. Allowing Ms. Calix to CONTINUE Tobramycin use will prevent future pulmonary infections, costs associated with potential hospitalizations, and even death. Continuation of tobramycin is currently the most clinically appropriate and medically necessary treatment. Your insurance companys denial disrupted her therapy. Please reconsider your denial to best treat my patient, and to give Ms. Calix a chance at a better quality of life with less hospitalizations and better symptom control.

## 2022-01-28 NOTE — TELEPHONE ENCOUNTER
Spoke to insurance ([employer] rep). Submitted urgent continuation of therapy PA via phone for Tobramycin 300mg/5mL #140 mL/28 DS.  [id number], estimated turnaround ~24 hours. Pending determination.

## 2022-01-31 ENCOUNTER — TELEPHONE (OUTPATIENT)
Dept: INFECTIOUS DISEASES | Facility: CLINIC | Age: 57
End: 2022-01-31
Payer: COMMERCIAL

## 2022-01-31 NOTE — TELEPHONE ENCOUNTER
----- Message from Gloria Garibay sent at 1/31/2022 12:41 PM CST -----  Regarding: pharm auth  Contact: Ortiz @ [phone] ext [phone]  Rec'd call from Ort[facility] ([facility]) calling to speak with someone in Dr. Dinero's office regarding getting the patient's prior authorization submitted for rx: tobramycin, PF, (PIETER) 300 mg/5 mL nebulizer solution.

## 2022-01-31 NOTE — TELEPHONE ENCOUNTER
10:54am Faxed urgent appeal to [facility] Appeal [phone], which was fax# provided to me. 11:55am Update: Fax to [phone] abandoned after 8 attempts.    11:55am Re-faxed to alternative [facility] fax number of [phone].   12: 07am Update: Your fax has been successfully sent to [phone] at [phone].    Pending determination.

## 2022-02-01 NOTE — TELEPHONE ENCOUNTER
Phoned [facility] and rep could not confirm an appeal was received. The rep provided more fax numbers: urgent line: [phone] and non-urgent [phone]. Faxed to URGENT line. Notified provider via secure chat [facility] is working on appeal. Will continue to follow.

## 2022-02-02 NOTE — TELEPHONE ENCOUNTER
Again called [facility] spoke to Provider Service Rep Cervantes (call ref #[id number]). She informed me it typically takes [facility] appeals 48 hours to receive a fax. I stressed the urgency of the situation in that I have called every day since Monday and have been provided a different fax number each time, and the pt has been out of medication. They could not confirm they received my appeal. She provided me another fax #[phone], which I faxed. Will continue to urgently follow. Spoke to patient via phone and updated her, she expressed understanding    11:20: Your fax has been successfully sent to [phone] at [phone].

## 2022-02-04 ENCOUNTER — TELEPHONE (OUTPATIENT)
Dept: INFECTIOUS DISEASES | Facility: CLINIC | Age: 57
End: 2022-02-04
Payer: COMMERCIAL

## 2022-02-04 DIAGNOSIS — J47.9 ADULT BRONCHIECTASIS: Primary | ICD-10-CM

## 2022-02-04 NOTE — TELEPHONE ENCOUNTER
Outreached to [facility] for a status check on tobramycin inhalation appeal. Spoke to provider  Angel V (call ref#[id number]). She informed me a decision was made on 2/2/22 and the denial was upheld based upon the criteria the pt does NOT have a CF diagnosis. Appeal case ID: [id number]. [facility] did not receive a copy of the faxed denial. Requested rep send me a copy of the determination letter and also to send back to clinical review team, as this medication is medically necessary and clinical appropriate. Rep pushed the case back to their clinical review team and informed me to check back on 2/7 for a decision.

## 2022-02-04 NOTE — TELEPHONE ENCOUNTER
Called patient and informed orders are in. She stated she will come to the lab by our office tomorrow and do sputum collection.

## 2022-02-04 NOTE — TELEPHONE ENCOUNTER
----- Message from Marisel Lott sent at 2/4/2022  2:02 PM CST -----  Regarding: Patient Advice  Contact: Pt [phone]  Patient is calling Dr. Dinero's office in regards to getting order sent over for active orders ASAP for [facility]. Please call. [phone]

## 2022-02-05 ENCOUNTER — LAB VISIT (OUTPATIENT)
Dept: LAB | Facility: HOSPITAL | Age: 57
End: 2022-02-05
Attending: INTERNAL MEDICINE
Payer: COMMERCIAL

## 2022-02-05 DIAGNOSIS — J47.9 ADULT BRONCHIECTASIS: ICD-10-CM

## 2022-02-05 PROCEDURE — 87206 SMEAR FLUORESCENT/ACID STAI: CPT | Performed by: INTERNAL MEDICINE

## 2022-02-05 PROCEDURE — 87102 FUNGUS ISOLATION CULTURE: CPT | Performed by: INTERNAL MEDICINE

## 2022-02-05 PROCEDURE — 87205 SMEAR GRAM STAIN: CPT | Performed by: INTERNAL MEDICINE

## 2022-02-05 PROCEDURE — 87015 SPECIMEN INFECT AGNT CONCNTJ: CPT | Performed by: INTERNAL MEDICINE

## 2022-02-05 PROCEDURE — 87116 MYCOBACTERIA CULTURE: CPT | Performed by: INTERNAL MEDICINE

## 2022-02-05 PROCEDURE — 87070 CULTURE OTHR SPECIMN AEROBIC: CPT | Performed by: INTERNAL MEDICINE

## 2022-02-05 PROCEDURE — 87106 FUNGI IDENTIFICATION YEAST: CPT | Performed by: INTERNAL MEDICINE

## 2022-02-07 NOTE — TELEPHONE ENCOUNTER
Outreached to [facility] for status update on urgent Olaf appeal. Spoke to Provider services advocate Emanuel BARNES(call ref#[id number]). She stated review team was still in process. Requests I call back later today. Rep is 100% sure a decision would be made by today. She confirmed my fax number to ensure a decision is faxed. Will continue to follow.

## 2022-02-08 LAB
BACTERIA SPEC AEROBE CULT: NORMAL
BACTERIA SPEC AEROBE CULT: NORMAL
GRAM STN SPEC: NORMAL

## 2022-02-08 NOTE — TELEPHONE ENCOUNTER
Faxed urgent second level appeal to [facility] at Fax: [phone] (which is fax# provided to me by rep earlier today, see previous notes). Requested a fax confirmation be sent to us upon received. Will continue to follow to ensure 2nd level appeal is received.    2/8/2022 1:29:37 PM Transmission Record- Fax successful

## 2022-02-08 NOTE — TELEPHONE ENCOUNTER
"Outreached to [facility] for status update. Spoke to Rosio KALEB (provider service rep, call ref#[id number]) states she sees "no significant notes yesterday from call ref#[id number]", and no change was made to upholding decision after clinical review team reviewed case. She referred to upheld decision letter received on 2/4/22. Will have to proceed with an expedited second-level appeal. Fax: [phone]. Left VM for patient to update. Notified provider. Will continue to follow.  "

## 2022-02-08 NOTE — TELEPHONE ENCOUNTER
Patient returned call. Informed her of the notes below. Patient verbalized understanding and expressed gratitude to [facility] Layne SIMMONS for all her help. Advised that [facility] will be in touch with any additional updates when available.

## 2022-02-10 ENCOUNTER — OFFICE VISIT (OUTPATIENT)
Dept: INFECTIOUS DISEASES | Facility: CLINIC | Age: 57
End: 2022-02-10
Payer: COMMERCIAL

## 2022-02-10 ENCOUNTER — LAB VISIT (OUTPATIENT)
Dept: LAB | Facility: HOSPITAL | Age: 57
End: 2022-02-10
Attending: INTERNAL MEDICINE
Payer: COMMERCIAL

## 2022-02-10 VITALS
BODY MASS INDEX: 16.95 KG/M2 | TEMPERATURE: 98 F | HEART RATE: 87 BPM | DIASTOLIC BLOOD PRESSURE: 78 MMHG | SYSTOLIC BLOOD PRESSURE: 135 MMHG | WEIGHT: 108 LBS | HEIGHT: 67 IN

## 2022-02-10 DIAGNOSIS — J47.9 ADULT BRONCHIECTASIS: Primary | ICD-10-CM

## 2022-02-10 DIAGNOSIS — J47.9 ADULT BRONCHIECTASIS: ICD-10-CM

## 2022-02-10 LAB
ALBUMIN SERPL BCP-MCNC: 4 G/DL (ref 3.5–5.2)
ALP SERPL-CCNC: 78 U/L (ref 55–135)
ALT SERPL W/O P-5'-P-CCNC: 23 U/L (ref 10–44)
ANION GAP SERPL CALC-SCNC: 7 MMOL/L (ref 8–16)
AST SERPL-CCNC: 26 U/L (ref 10–40)
BASOPHILS # BLD AUTO: 0.02 K/UL (ref 0–0.2)
BASOPHILS NFR BLD: 0.3 % (ref 0–1.9)
BILIRUB SERPL-MCNC: 1.1 MG/DL (ref 0.1–1)
BUN SERPL-MCNC: 11 MG/DL (ref 6–20)
CALCIUM SERPL-MCNC: 10 MG/DL (ref 8.7–10.5)
CHLORIDE SERPL-SCNC: 103 MMOL/L (ref 95–110)
CO2 SERPL-SCNC: 29 MMOL/L (ref 23–29)
CREAT SERPL-MCNC: 0.7 MG/DL (ref 0.5–1.4)
CRP SERPL-MCNC: 19.89 MG/L (ref 0–3.19)
DIFFERENTIAL METHOD: NORMAL
EOSINOPHIL # BLD AUTO: 0.1 K/UL (ref 0–0.5)
EOSINOPHIL NFR BLD: 1.2 % (ref 0–8)
ERYTHROCYTE [DISTWIDTH] IN BLOOD BY AUTOMATED COUNT: 13 % (ref 11.5–14.5)
EST. GFR  (AFRICAN AMERICAN): >60 ML/MIN/1.73 M^2
EST. GFR  (NON AFRICAN AMERICAN): >60 ML/MIN/1.73 M^2
GLUCOSE SERPL-MCNC: 102 MG/DL (ref 70–110)
HCT VFR BLD AUTO: 46 % (ref 37–48.5)
HGB BLD-MCNC: 14.7 G/DL (ref 12–16)
IMM GRANULOCYTES # BLD AUTO: 0.02 K/UL (ref 0–0.04)
IMM GRANULOCYTES NFR BLD AUTO: 0.3 % (ref 0–0.5)
LYMPHOCYTES # BLD AUTO: 1.9 K/UL (ref 1–4.8)
LYMPHOCYTES NFR BLD: 25.8 % (ref 18–48)
MCH RBC QN AUTO: 30.6 PG (ref 27–31)
MCHC RBC AUTO-ENTMCNC: 32 G/DL (ref 32–36)
MCV RBC AUTO: 96 FL (ref 82–98)
MONOCYTES # BLD AUTO: 0.6 K/UL (ref 0.3–1)
MONOCYTES NFR BLD: 8.7 % (ref 4–15)
NEUTROPHILS # BLD AUTO: 4.6 K/UL (ref 1.8–7.7)
NEUTROPHILS NFR BLD: 63.7 % (ref 38–73)
NRBC BLD-RTO: 0 /100 WBC
PLATELET # BLD AUTO: 241 K/UL (ref 150–450)
PMV BLD AUTO: 11 FL (ref 9.2–12.9)
POTASSIUM SERPL-SCNC: 4 MMOL/L (ref 3.5–5.1)
PROT SERPL-MCNC: 8 G/DL (ref 6–8.4)
RBC # BLD AUTO: 4.8 M/UL (ref 4–5.4)
SODIUM SERPL-SCNC: 139 MMOL/L (ref 136–145)
WBC # BLD AUTO: 7.22 K/UL (ref 3.9–12.7)

## 2022-02-10 PROCEDURE — 85025 COMPLETE CBC W/AUTO DIFF WBC: CPT | Performed by: INTERNAL MEDICINE

## 2022-02-10 PROCEDURE — 99215 PR OFFICE/OUTPT VISIT, EST, LEVL V, 40-54 MIN: ICD-10-PCS | Mod: S$GLB,,, | Performed by: INTERNAL MEDICINE

## 2022-02-10 PROCEDURE — 86141 C-REACTIVE PROTEIN HS: CPT | Performed by: INTERNAL MEDICINE

## 2022-02-10 PROCEDURE — 3075F PR MOST RECENT SYSTOLIC BLOOD PRESS GE 130-139MM HG: ICD-10-PCS | Mod: CPTII,S$GLB,, | Performed by: INTERNAL MEDICINE

## 2022-02-10 PROCEDURE — 1159F MED LIST DOCD IN RCRD: CPT | Mod: CPTII,S$GLB,, | Performed by: INTERNAL MEDICINE

## 2022-02-10 PROCEDURE — 3078F PR MOST RECENT DIASTOLIC BLOOD PRESSURE < 80 MM HG: ICD-10-PCS | Mod: CPTII,S$GLB,, | Performed by: INTERNAL MEDICINE

## 2022-02-10 PROCEDURE — 3075F SYST BP GE 130 - 139MM HG: CPT | Mod: CPTII,S$GLB,, | Performed by: INTERNAL MEDICINE

## 2022-02-10 PROCEDURE — 1159F PR MEDICATION LIST DOCUMENTED IN MEDICAL RECORD: ICD-10-PCS | Mod: CPTII,S$GLB,, | Performed by: INTERNAL MEDICINE

## 2022-02-10 PROCEDURE — 3078F DIAST BP <80 MM HG: CPT | Mod: CPTII,S$GLB,, | Performed by: INTERNAL MEDICINE

## 2022-02-10 PROCEDURE — 87305 ASPERGILLUS AG IA: CPT | Performed by: INTERNAL MEDICINE

## 2022-02-10 PROCEDURE — 80053 COMPREHEN METABOLIC PANEL: CPT | Performed by: INTERNAL MEDICINE

## 2022-02-10 PROCEDURE — 3008F PR BODY MASS INDEX (BMI) DOCUMENTED: ICD-10-PCS | Mod: CPTII,S$GLB,, | Performed by: INTERNAL MEDICINE

## 2022-02-10 PROCEDURE — 87449 NOS EACH ORGANISM AG IA: CPT | Performed by: INTERNAL MEDICINE

## 2022-02-10 PROCEDURE — 99215 OFFICE O/P EST HI 40 MIN: CPT | Mod: S$GLB,,, | Performed by: INTERNAL MEDICINE

## 2022-02-10 PROCEDURE — 36415 COLL VENOUS BLD VENIPUNCTURE: CPT | Performed by: INTERNAL MEDICINE

## 2022-02-10 PROCEDURE — 99999 PR PBB SHADOW E&M-EST. PATIENT-LVL III: ICD-10-PCS | Mod: PBBFAC,,, | Performed by: INTERNAL MEDICINE

## 2022-02-10 PROCEDURE — 3008F BODY MASS INDEX DOCD: CPT | Mod: CPTII,S$GLB,, | Performed by: INTERNAL MEDICINE

## 2022-02-10 PROCEDURE — 99999 PR PBB SHADOW E&M-EST. PATIENT-LVL III: CPT | Mod: PBBFAC,,, | Performed by: INTERNAL MEDICINE

## 2022-02-10 NOTE — PROGRESS NOTES
Subjective:     Patient ID: Corrie Calix is a 56 y.o. female    Chief Complaint: SOB, cough, bronchiectasis    HPI: 56F hx immunodeficiency c/b bronchiectatic lung disease w/ hx pseudomonal pneumonia presents to clinic for follow up. She has been unable to refill her inhaled tobramycin because of a change in insurance coverage. She continues to feel unwell with slowly progressive cough, sputum production, and shortness of breath that limits her activities. She states that when she was using the tobramycin nebs, she would have several days when she felt good. She has been off of them for about 10 days now. She is also having issues with insurance coverage of her cuvitru, which is still pending approval. She just recently got her dupixent refilled. Has not had follow up with pulmonary recently. Last CT was in June 2021. Recent sputum cx positive only for candida. No recent systemic antibiotics. Still having significant amount of sputum production. No recent fevers. Endorses recent skin rashes that come and go.       Immunization History   Administered Date(s) Administered    COVID-19, MRNA, LN-S, PF (MODERNA FULL 0.5 ML DOSE) 02/25/2021, 03/25/2021, 10/04/2021    Pneumococcal Conjugate - 13 Valent 08/15/2018    Tdap 11/10/2019        Review of Systems   All other systems reviewed and are negative.       Past Medical History:   Diagnosis Date    Allergy     Anti-polysaccharide antibody deficiency     Asthma     Pleurisy     Pneumothorax      Past Surgical History:   Procedure Laterality Date    BRONCHOSCOPY N/A 6/29/2021    Procedure: BRONCHOSCOPY;  Surgeon: [facility] Diagnostic Provider;  Location: [facility] OR [address] [place] [address];  Service: Anesthesiology;  Laterality: N/A;    EYE SURGERY      LUNG SURGERY       Family History   Problem Relation Age of Onset    Cancer Mother     No Known Problems Father      Social History     Tobacco Use    Smoking status: Former Smoker    Smokeless tobacco: Never Used   Substance Use  Topics    Alcohol use: Never    Drug use: Never       Objective:     Physical Exam  Constitutional:       General: She is not in acute distress.     Appearance: Normal appearance. She is well-developed. She is not ill-appearing or diaphoretic.   HENT:      Head: Normocephalic and atraumatic.      Right Ear: External ear normal.      Left Ear: External ear normal.      Nose: Nose normal.   Eyes:      General: No scleral icterus.        Right eye: No discharge.         Left eye: No discharge.      Extraocular Movements: Extraocular movements intact.      Conjunctiva/sclera: Conjunctivae normal.   Pulmonary:      Effort: Pulmonary effort is normal. No respiratory distress.      Breath sounds: No stridor. Rhonchi present.      Comments: Coarse breath sounds, productive cough  Musculoskeletal:         General: Normal range of motion.   Skin:     Findings: No erythema or rash.   Neurological:      General: No focal deficit present.      Mental Status: She is alert and oriented to person, place, and time. Mental status is at baseline.      Cranial Nerves: No cranial nerve deficit.   Psychiatric:         Mood and Affect: Mood normal.         Behavior: Behavior normal.         Thought Content: Thought content normal.         Judgment: Judgment normal.       Data:    All data, including recent labs, radiology, and pathology, has been independently reviewed.    Labs:    No results found in the last 24 hours.     Radiology:    No results found in the last 24 hours.     Assessment:    1. Adult bronchiectasis  · Will check labs today  · Pt does have a past cx + aspergillus, has not been treated for this yet. Unclear how significantly it is contributing to her symptoms .Will check serum markers. If positive, will start treatment.   · Schedule follow up w/ pulmonary for further eval, repeat PFT, imaging  · Consider referral to lung transplant service to determine if pt may be a candidate for transplant in the future  · Continue  saline nebs and chest vest for secretion management    CBC Auto Differential    Comprehensive Metabolic Panel    HIGH SENSITIVITY CRP    ASPERGILLUS (GALACTOMANNAN) AG, SERUM    Fungitell Assay For (1.3)-B-D-Glucans        Follow up in 3 months    The total time for evaluation and management services performed on 2/10/22 was greater than 40 minutes.     Patricia Dinero DO  Infectious Disease

## 2022-02-10 NOTE — TELEPHONE ENCOUNTER
Received a voicemail from Elidia at [facility] stating appeal has been upheld and a letter is to follow with more information. Notified provider. Will await determination letter for next steps.

## 2022-02-11 ENCOUNTER — PATIENT MESSAGE (OUTPATIENT)
Dept: INFECTIOUS DISEASES | Facility: CLINIC | Age: 57
End: 2022-02-11
Payer: COMMERCIAL

## 2022-02-11 DIAGNOSIS — J47.9 ADULT BRONCHIECTASIS: Primary | ICD-10-CM

## 2022-02-11 NOTE — TELEPHONE ENCOUNTER
Incoming call from a [facility] appeals rep (Stephanie), regarding another case. While on the line I asked her to look into patient Corrie's case. She informed me final internal appeal has been upheld and a physical copy of the letter would be mailed to the provider's office. She could not agree to faxing [facility] a copy. Outreached to [facility] P2P line (spoke to Lizeth WAGNER, P2P dept rep)--because we exhausted internal appeal, [facility] would not allow a P2P between Dr. Dinero and a [facility] medical director. They instead pointed me to external appeal with [place] [employer].     Spoke to Corrie who was aware as she received a copy of her appeal letter in her [facility] portal. She provided me a copy of the letter--uploaded to media.

## 2022-02-13 LAB
1,3 BETA GLUCAN SER-MCNC: 36 PG/ML
FUNGITELL COMMENTS: NEGATIVE

## 2022-02-14 ENCOUNTER — TELEPHONE (OUTPATIENT)
Dept: PULMONOLOGY | Facility: CLINIC | Age: 57
End: 2022-02-14
Payer: COMMERCIAL

## 2022-02-14 LAB — GALACTOMANNAN AG SERPL IA-ACNC: <0.5 INDEX

## 2022-02-14 NOTE — TELEPHONE ENCOUNTER
----- Message from Henrique Duran sent at 2/14/2022 11:02 AM CST -----  Regarding: Pt returning a call to Hui      Please contact the Pt, as she missed a call from Hui about appt for next week.     #  [phone]

## 2022-02-14 NOTE — TELEPHONE ENCOUNTER
LVM for patient to return my call. I was calling to schedule patient for follow up with Dr Hernadez next week. Ms Calix to call back to discuss.

## 2022-02-14 NOTE — TELEPHONE ENCOUNTER
----- Message from Stevie Hernadez MD sent at 2/11/2022  5:03 PM CST -----  I'll see her again and try to work on this. We had gotten her a vest approved but I'll see her to see if I can add anything.   Hui: Can we add her to my clinic next week.     Stevie  ----- Message -----  From: Nelsy Dinero DO  Sent: 2/11/2022  12:29 PM CST  To: MD Andrez Boykin,    Need your help with this patient. She has been slowly feeling worse, continues to have significant cough, and progressive limitations in activity due to shortness of breath. She switched insurance, and her new company will not cover her tobra nebs - specialty pharmacy has submitted a second appeal but hasn't had any luck yet. Her insurance is also not covering her immunoglobulin therapy, and just recently refilled her dupixent. Recent sputum cx only grew candida. Her high sensitivity CRP is increased to 20. She did grow an aspergillus in the past but has been somewhat hesitant to start on therapy due to potential side effects. I saw her yesterday and she is asking for other ideas to help. Not sure if she would benefit from another CT, PFT, bronch? Management of bronchiectasis is definitely outside of my expertise.     Thanks!

## 2022-02-15 NOTE — TELEPHONE ENCOUNTER
Initiated request for EXPEDITED EXTERNAL review. Faxed documentation to [phone]. Will continue to follow.

## 2022-02-21 NOTE — TELEPHONE ENCOUNTER
Called provider services line for status update, spoke to Cande (call ref [id number]) to see if [facility] received the request for an external appeal. [facility] states they have not received the fax but fax on the appeal ([phone]) that is the best fax number. I informed them I had successfully sent the fax last week (and had received successful fax confirmation), but they could not confirm receipt. Rep also did not have a phone number or contact for the department. Will attempt to fax again.

## 2022-03-04 DIAGNOSIS — J47.9 ADULT BRONCHIECTASIS: ICD-10-CM

## 2022-03-04 RX ORDER — TOBRAMYCIN INHALATION SOLUTION 300 MG/5ML
300 INHALANT RESPIRATORY (INHALATION) EVERY 12 HOURS
Qty: 140 ML | Refills: 11 | OUTPATIENT
Start: 2022-03-04 | End: 2022-03-04 | Stop reason: SDUPTHER

## 2022-03-04 RX ORDER — TOBRAMYCIN INHALATION SOLUTION 300 MG/5ML
300 INHALANT RESPIRATORY (INHALATION) EVERY 12 HOURS
Qty: 140 ML | Refills: 11 | Status: SHIPPED | OUTPATIENT
Start: 2022-03-04 | End: 2022-08-15 | Stop reason: SDUPTHER

## 2022-03-04 NOTE — TELEPHONE ENCOUNTER
Dr. Dinero notified me she received a letter from [employer], independent external review organization, stating the denial of PIETER has been overturned. I uploaded the letter to . Test claim shows [facility] is out of network. I outreached to [facility] because we had never received documentation with the case details or approval. Spoke to rep Kentrell MANUEL (pharmacy technician) who provided info: PIETER [place] Approval dates: 2/21/22- 2/21/23; Case ID:[id number]. [facility] pharmacy in-network. The rep ran a test claim and he informed me claim should be payable at [facility]. I requested Dr. Dinero sent a PIETER Rx to [facility]. Outreached to patient to notify her PIETER denial was been overturned. Pt expressed much gratitude for help in this process. Counseled to restart PIETER nebs ASAP. Encouraged to call [facility] if any issues arise with [facility]. Closing referral and out of [facility]'s Patient Management Program at this time.

## 2022-03-05 ENCOUNTER — LAB VISIT (OUTPATIENT)
Dept: LAB | Facility: HOSPITAL | Age: 57
End: 2022-03-05
Attending: INTERNAL MEDICINE
Payer: COMMERCIAL

## 2022-03-05 DIAGNOSIS — J47.9 ADULT BRONCHIECTASIS: ICD-10-CM

## 2022-03-05 PROCEDURE — 87186 SC STD MICRODIL/AGAR DIL: CPT | Performed by: INTERNAL MEDICINE

## 2022-03-05 PROCEDURE — 87205 SMEAR GRAM STAIN: CPT | Performed by: INTERNAL MEDICINE

## 2022-03-05 PROCEDURE — 87077 CULTURE AEROBIC IDENTIFY: CPT | Performed by: INTERNAL MEDICINE

## 2022-03-05 PROCEDURE — 87070 CULTURE OTHR SPECIMN AEROBIC: CPT | Performed by: INTERNAL MEDICINE

## 2022-03-07 ENCOUNTER — PATIENT MESSAGE (OUTPATIENT)
Dept: INFECTIOUS DISEASES | Facility: CLINIC | Age: 57
End: 2022-03-07
Payer: COMMERCIAL

## 2022-03-07 DIAGNOSIS — J15.1 PNEUMONIA OF BOTH LUNGS DUE TO PSEUDOMONAS SPECIES, UNSPECIFIED PART OF LUNG: ICD-10-CM

## 2022-03-07 DIAGNOSIS — B37.0 THRUSH: Primary | ICD-10-CM

## 2022-03-07 LAB
BACTERIA SPEC AEROBE CULT: ABNORMAL
GRAM STN SPEC: ABNORMAL

## 2022-03-08 ENCOUNTER — PATIENT MESSAGE (OUTPATIENT)
Dept: INFECTIOUS DISEASES | Facility: CLINIC | Age: 57
End: 2022-03-08
Payer: COMMERCIAL

## 2022-03-08 LAB — FUNGUS SPEC CULT: ABNORMAL

## 2022-03-08 RX ORDER — CIPROFLOXACIN 750 MG/1
750 TABLET, FILM COATED ORAL EVERY 12 HOURS
Qty: 14 TABLET | Refills: 0 | Status: SHIPPED | OUTPATIENT
Start: 2022-03-08 | End: 2022-03-15

## 2022-03-08 RX ORDER — FLUCONAZOLE 200 MG/1
400 TABLET ORAL DAILY
Qty: 6 TABLET | Refills: 0 | Status: SHIPPED | OUTPATIENT
Start: 2022-03-08 | End: 2022-03-11

## 2022-03-09 ENCOUNTER — PATIENT MESSAGE (OUTPATIENT)
Dept: INFECTIOUS DISEASES | Facility: CLINIC | Age: 57
End: 2022-03-09
Payer: COMMERCIAL

## 2022-03-23 ENCOUNTER — OFFICE VISIT (OUTPATIENT)
Dept: PULMONOLOGY | Facility: CLINIC | Age: 57
End: 2022-03-23
Payer: COMMERCIAL

## 2022-03-23 DIAGNOSIS — J47.1 BRONCHIECTASIS WITH ACUTE EXACERBATION: Primary | ICD-10-CM

## 2022-03-23 PROCEDURE — 99213 OFFICE O/P EST LOW 20 MIN: CPT | Mod: 95,,, | Performed by: INTERNAL MEDICINE

## 2022-03-23 PROCEDURE — 99213 PR OFFICE/OUTPT VISIT, EST, LEVL III, 20-29 MIN: ICD-10-PCS | Mod: 95,,, | Performed by: INTERNAL MEDICINE

## 2022-03-23 NOTE — PROGRESS NOTES
The patient location is: home  The chief complaint leading to consultation is: Bronhiectasis    Visit type: audiovisual    Face to Face time with patient:   20 minutes of total time spent on the encounter, which includes face to face time and non-face to face time preparing to see the patient (eg, review of tests), Obtaining and/or reviewing separately obtained history, Documenting clinical information in the electronic or other health record, Independently interpreting results (not separately reported) and communicating results to the patient/family/caregiver, or Care coordination (not separately reported).         Each patient to whom he or she provides medical services by telemedicine is:  (1) informed of the relationship between the physician and patient and the respective role of any other health care provider with respect to management of the patient; and (2) notified that he or she may decline to receive medical services by telemedicine and may withdraw from such care at any time.    Notes:   56 year old who is currently on inhaled preet, saline nebulization and uses acapella and percussion vest therapy. She has improved tremendously on this medical regimen. Her percussion vest is medically necessary to the continued improvement. She was having a hard time completing her ADLs without the vest.     Will continue to follow patient regularly. She is very functional at this time with minimal limitations when on her complete treatment regimen.   PFTs, Imaging if condition changes.   Given improvement in symptoms would not refer to Lung Transplant at this time.      Will continue to follow patient along with infectious disease, Dr. Dineor.     Stevie Hernadez MD

## 2022-03-26 LAB
ACID FAST MOD KINY STN SPEC: NORMAL
MYCOBACTERIUM SPEC QL CULT: NORMAL

## 2022-04-28 ENCOUNTER — LAB VISIT (OUTPATIENT)
Dept: LAB | Facility: HOSPITAL | Age: 57
End: 2022-04-28
Attending: INTERNAL MEDICINE
Payer: COMMERCIAL

## 2022-04-28 ENCOUNTER — PATIENT MESSAGE (OUTPATIENT)
Dept: INFECTIOUS DISEASES | Facility: CLINIC | Age: 57
End: 2022-04-28
Payer: COMMERCIAL

## 2022-04-28 DIAGNOSIS — M25.50 ARTHRALGIA, UNSPECIFIED JOINT: Primary | ICD-10-CM

## 2022-04-28 DIAGNOSIS — M25.50 ARTHRALGIA, UNSPECIFIED JOINT: ICD-10-CM

## 2022-04-28 LAB
ALBUMIN SERPL BCP-MCNC: 4 G/DL (ref 3.5–5.2)
ALP SERPL-CCNC: 65 U/L (ref 55–135)
ALT SERPL W/O P-5'-P-CCNC: 34 U/L (ref 10–44)
ANION GAP SERPL CALC-SCNC: 8 MMOL/L (ref 8–16)
AST SERPL-CCNC: 40 U/L (ref 10–40)
BASOPHILS # BLD AUTO: 0.05 K/UL (ref 0–0.2)
BASOPHILS NFR BLD: 0.9 % (ref 0–1.9)
BILIRUB SERPL-MCNC: 1.4 MG/DL (ref 0.1–1)
BUN SERPL-MCNC: 17 MG/DL (ref 6–20)
CALCIUM SERPL-MCNC: 10.3 MG/DL (ref 8.7–10.5)
CHLORIDE SERPL-SCNC: 103 MMOL/L (ref 95–110)
CO2 SERPL-SCNC: 29 MMOL/L (ref 23–29)
CREAT SERPL-MCNC: 0.8 MG/DL (ref 0.5–1.4)
CRP SERPL-MCNC: 0.6 MG/L (ref 0–8.2)
DIFFERENTIAL METHOD: ABNORMAL
EOSINOPHIL # BLD AUTO: 0.6 K/UL (ref 0–0.5)
EOSINOPHIL NFR BLD: 10.8 % (ref 0–8)
ERYTHROCYTE [DISTWIDTH] IN BLOOD BY AUTOMATED COUNT: 12.8 % (ref 11.5–14.5)
ERYTHROCYTE [SEDIMENTATION RATE] IN BLOOD BY WESTERGREN METHOD: 13 MM/HR (ref 0–20)
EST. GFR  (AFRICAN AMERICAN): >60 ML/MIN/1.73 M^2
EST. GFR  (NON AFRICAN AMERICAN): >60 ML/MIN/1.73 M^2
GLUCOSE SERPL-MCNC: 97 MG/DL (ref 70–110)
HCT VFR BLD AUTO: 41.9 % (ref 37–48.5)
HGB BLD-MCNC: 13.9 G/DL (ref 12–16)
IMM GRANULOCYTES # BLD AUTO: 0 K/UL (ref 0–0.04)
IMM GRANULOCYTES NFR BLD AUTO: 0 % (ref 0–0.5)
LYMPHOCYTES # BLD AUTO: 2.6 K/UL (ref 1–4.8)
LYMPHOCYTES NFR BLD: 45.1 % (ref 18–48)
MCH RBC QN AUTO: 29.9 PG (ref 27–31)
MCHC RBC AUTO-ENTMCNC: 33.2 G/DL (ref 32–36)
MCV RBC AUTO: 90 FL (ref 82–98)
MONOCYTES # BLD AUTO: 0.6 K/UL (ref 0.3–1)
MONOCYTES NFR BLD: 9.6 % (ref 4–15)
NEUTROPHILS # BLD AUTO: 2 K/UL (ref 1.8–7.7)
NEUTROPHILS NFR BLD: 33.6 % (ref 38–73)
NRBC BLD-RTO: 0 /100 WBC
PLATELET # BLD AUTO: 217 K/UL (ref 150–450)
PMV BLD AUTO: 11.9 FL (ref 9.2–12.9)
POTASSIUM SERPL-SCNC: 4 MMOL/L (ref 3.5–5.1)
PROT SERPL-MCNC: 7.7 G/DL (ref 6–8.4)
RBC # BLD AUTO: 4.65 M/UL (ref 4–5.4)
SODIUM SERPL-SCNC: 140 MMOL/L (ref 136–145)
WBC # BLD AUTO: 5.85 K/UL (ref 3.9–12.7)

## 2022-04-28 PROCEDURE — 85025 COMPLETE CBC W/AUTO DIFF WBC: CPT | Performed by: INTERNAL MEDICINE

## 2022-04-28 PROCEDURE — 80053 COMPREHEN METABOLIC PANEL: CPT | Performed by: INTERNAL MEDICINE

## 2022-04-28 PROCEDURE — 86038 ANTINUCLEAR ANTIBODIES: CPT | Performed by: INTERNAL MEDICINE

## 2022-04-28 PROCEDURE — 86431 RHEUMATOID FACTOR QUANT: CPT | Performed by: INTERNAL MEDICINE

## 2022-04-28 PROCEDURE — 86140 C-REACTIVE PROTEIN: CPT | Performed by: INTERNAL MEDICINE

## 2022-04-28 PROCEDURE — 85652 RBC SED RATE AUTOMATED: CPT | Performed by: INTERNAL MEDICINE

## 2022-04-28 PROCEDURE — 36415 COLL VENOUS BLD VENIPUNCTURE: CPT | Performed by: INTERNAL MEDICINE

## 2022-04-29 ENCOUNTER — PATIENT MESSAGE (OUTPATIENT)
Dept: INFECTIOUS DISEASES | Facility: CLINIC | Age: 57
End: 2022-04-29
Payer: COMMERCIAL

## 2022-04-29 DIAGNOSIS — J47.9 ADULT BRONCHIECTASIS: ICD-10-CM

## 2022-04-29 LAB — RHEUMATOID FACT SERPL-ACNC: <13 IU/ML (ref 0–15)

## 2022-04-29 RX ORDER — SODIUM CHLORIDE FOR INHALATION 3 %
4 VIAL, NEBULIZER (ML) INHALATION 2 TIMES DAILY
Qty: 240 ML | Refills: 3 | Status: CANCELLED | OUTPATIENT
Start: 2022-04-29

## 2022-04-29 RX ORDER — SODIUM CHLORIDE FOR INHALATION 3 %
4 VIAL, NEBULIZER (ML) INHALATION 2 TIMES DAILY
Qty: 240 ML | Refills: 11 | Status: SHIPPED | OUTPATIENT
Start: 2022-04-29 | End: 2023-05-12

## 2022-05-02 LAB — ANA SER-ACNC: NORMAL

## 2022-05-03 ENCOUNTER — TELEPHONE (OUTPATIENT)
Dept: PULMONOLOGY | Facility: CLINIC | Age: 57
End: 2022-05-03
Payer: COMMERCIAL

## 2022-05-03 NOTE — TELEPHONE ENCOUNTER
----- Message from Drew Iraheta sent at 5/3/2022 10:36 AM CDT -----  Regarding: Pt Inquiry  Meaghan with [employer] called and requested to speak with Hui, requesting a call back.      [phone]

## 2022-05-03 NOTE — TELEPHONE ENCOUNTER
Spoke with Ms Harding with [employer]. Informed her that I have received her message. Ms Harding states that she needs patient clinic notes form Dr Hernadez last visit with patient. I verbalized to Ms Harding that I understand and advised Ms Harding that I will fax her patient notes. Ms Meaghan verbalized that she understand.

## 2022-05-16 ENCOUNTER — TELEPHONE (OUTPATIENT)
Dept: NEUROLOGY | Facility: CLINIC | Age: 57
End: 2022-05-16
Payer: COMMERCIAL

## 2022-05-16 NOTE — TELEPHONE ENCOUNTER
----- Message from Layne Mullen MA sent at 5/16/2022  2:32 PM CDT -----  Please schedule with Gen Neuro   ----- Message -----  From: Rae Mackay  Sent: 5/16/2022   2:20 PM CDT  To: Mina BOLA[facility] Staff    Type:  Needs Medical Advice    Who Called: pt  Symptoms (please be specific): pt is having numbness with both hands and feet  and knees pt has something called Guillain-barre pt will be New  How long has patient had these symptoms:  03/20/22  Pharmacy name and phone #:    Would the patient rather a call back or a response via [facility]? call  Best Call Back Number: [phone]  Additional Information:

## 2022-05-17 ENCOUNTER — PATIENT MESSAGE (OUTPATIENT)
Dept: INFECTIOUS DISEASES | Facility: CLINIC | Age: 57
End: 2022-05-17
Payer: COMMERCIAL

## 2022-05-17 DIAGNOSIS — G62.9 NEUROPATHY: Primary | ICD-10-CM

## 2022-05-17 NOTE — PROGRESS NOTES
Pt w/ progressive neuropathy started in lower extremities, no progressed to upper. Work up ordered. Neurology referral and PCP referral placed. Will see if we can expedite evaluations given progressing symptoms. If cannot fit in w/in 1-2 weeks, may ask patient to come for hospital admission and inpatient work up.

## 2022-05-19 ENCOUNTER — OFFICE VISIT (OUTPATIENT)
Dept: NEUROLOGY | Facility: CLINIC | Age: 57
End: 2022-05-19
Payer: COMMERCIAL

## 2022-05-19 ENCOUNTER — LAB VISIT (OUTPATIENT)
Dept: LAB | Facility: HOSPITAL | Age: 57
End: 2022-05-19
Payer: COMMERCIAL

## 2022-05-19 VITALS
DIASTOLIC BLOOD PRESSURE: 92 MMHG | HEART RATE: 83 BPM | HEIGHT: 67 IN | WEIGHT: 104.19 LBS | SYSTOLIC BLOOD PRESSURE: 133 MMHG | BODY MASS INDEX: 16.35 KG/M2

## 2022-05-19 DIAGNOSIS — G62.9 NEUROPATHY: ICD-10-CM

## 2022-05-19 DIAGNOSIS — N80.9 ENDOMETRIOSIS: ICD-10-CM

## 2022-05-19 DIAGNOSIS — J45.909 ASTHMA, UNSPECIFIED ASTHMA SEVERITY, UNSPECIFIED WHETHER COMPLICATED, UNSPECIFIED WHETHER PERSISTENT: ICD-10-CM

## 2022-05-19 DIAGNOSIS — G62.9 NEUROPATHY: Primary | ICD-10-CM

## 2022-05-19 DIAGNOSIS — J47.9 ADULT BRONCHIECTASIS: ICD-10-CM

## 2022-05-19 LAB
ALBUMIN SERPL BCP-MCNC: 4.3 G/DL (ref 3.5–5.2)
ALP SERPL-CCNC: 71 U/L (ref 55–135)
ALT SERPL W/O P-5'-P-CCNC: 26 U/L (ref 10–44)
ANION GAP SERPL CALC-SCNC: 13 MMOL/L (ref 8–16)
AST SERPL-CCNC: 35 U/L (ref 10–40)
BASOPHILS # BLD AUTO: 0.04 K/UL (ref 0–0.2)
BASOPHILS # BLD AUTO: 0.04 K/UL (ref 0–0.2)
BASOPHILS NFR BLD: 0.8 % (ref 0–1.9)
BASOPHILS NFR BLD: 0.8 % (ref 0–1.9)
BILIRUB SERPL-MCNC: 1.9 MG/DL (ref 0.1–1)
BUN SERPL-MCNC: 12 MG/DL (ref 6–20)
CALCIUM SERPL-MCNC: 10.1 MG/DL (ref 8.7–10.5)
CHLORIDE SERPL-SCNC: 104 MMOL/L (ref 95–110)
CO2 SERPL-SCNC: 24 MMOL/L (ref 23–29)
CREAT SERPL-MCNC: 0.7 MG/DL (ref 0.5–1.4)
CRP SERPL-MCNC: 0.9 MG/L (ref 0–8.2)
DIFFERENTIAL METHOD: ABNORMAL
DIFFERENTIAL METHOD: ABNORMAL
EOSINOPHIL # BLD AUTO: 0.4 K/UL (ref 0–0.5)
EOSINOPHIL # BLD AUTO: 0.4 K/UL (ref 0–0.5)
EOSINOPHIL NFR BLD: 8.7 % (ref 0–8)
EOSINOPHIL NFR BLD: 8.7 % (ref 0–8)
ERYTHROCYTE [DISTWIDTH] IN BLOOD BY AUTOMATED COUNT: 13 % (ref 11.5–14.5)
ERYTHROCYTE [DISTWIDTH] IN BLOOD BY AUTOMATED COUNT: 13 % (ref 11.5–14.5)
ERYTHROCYTE [SEDIMENTATION RATE] IN BLOOD BY WESTERGREN METHOD: 14 MM/HR (ref 0–36)
ERYTHROCYTE [SEDIMENTATION RATE] IN BLOOD BY WESTERGREN METHOD: 14 MM/HR (ref 0–36)
EST. GFR  (AFRICAN AMERICAN): >60 ML/MIN/1.73 M^2
EST. GFR  (NON AFRICAN AMERICAN): >60 ML/MIN/1.73 M^2
ESTIMATED AVG GLUCOSE: 108 MG/DL (ref 68–131)
ESTIMATED AVG GLUCOSE: 108 MG/DL (ref 68–131)
FOLATE SERPL-MCNC: 16.1 NG/ML (ref 4–24)
GLUCOSE SERPL-MCNC: 83 MG/DL (ref 70–110)
HBA1C MFR BLD: 5.4 % (ref 4–5.6)
HBA1C MFR BLD: 5.4 % (ref 4–5.6)
HCT VFR BLD AUTO: 42.8 % (ref 37–48.5)
HCT VFR BLD AUTO: 42.8 % (ref 37–48.5)
HGB BLD-MCNC: 14.4 G/DL (ref 12–16)
HGB BLD-MCNC: 14.4 G/DL (ref 12–16)
IMM GRANULOCYTES # BLD AUTO: 0.01 K/UL (ref 0–0.04)
IMM GRANULOCYTES # BLD AUTO: 0.01 K/UL (ref 0–0.04)
IMM GRANULOCYTES NFR BLD AUTO: 0.2 % (ref 0–0.5)
IMM GRANULOCYTES NFR BLD AUTO: 0.2 % (ref 0–0.5)
LYMPHOCYTES # BLD AUTO: 2.4 K/UL (ref 1–4.8)
LYMPHOCYTES # BLD AUTO: 2.4 K/UL (ref 1–4.8)
LYMPHOCYTES NFR BLD: 47.6 % (ref 18–48)
LYMPHOCYTES NFR BLD: 47.6 % (ref 18–48)
MCH RBC QN AUTO: 29.8 PG (ref 27–31)
MCH RBC QN AUTO: 29.8 PG (ref 27–31)
MCHC RBC AUTO-ENTMCNC: 33.6 G/DL (ref 32–36)
MCHC RBC AUTO-ENTMCNC: 33.6 G/DL (ref 32–36)
MCV RBC AUTO: 89 FL (ref 82–98)
MCV RBC AUTO: 89 FL (ref 82–98)
MONOCYTES # BLD AUTO: 0.5 K/UL (ref 0.3–1)
MONOCYTES # BLD AUTO: 0.5 K/UL (ref 0.3–1)
MONOCYTES NFR BLD: 9.3 % (ref 4–15)
MONOCYTES NFR BLD: 9.3 % (ref 4–15)
NEUTROPHILS # BLD AUTO: 1.7 K/UL (ref 1.8–7.7)
NEUTROPHILS # BLD AUTO: 1.7 K/UL (ref 1.8–7.7)
NEUTROPHILS NFR BLD: 33.4 % (ref 38–73)
NEUTROPHILS NFR BLD: 33.4 % (ref 38–73)
NRBC BLD-RTO: 0 /100 WBC
NRBC BLD-RTO: 0 /100 WBC
PLATELET # BLD AUTO: 217 K/UL (ref 150–450)
PLATELET # BLD AUTO: 217 K/UL (ref 150–450)
PMV BLD AUTO: 12.5 FL (ref 9.2–12.9)
PMV BLD AUTO: 12.5 FL (ref 9.2–12.9)
POTASSIUM SERPL-SCNC: 3.6 MMOL/L (ref 3.5–5.1)
PROT SERPL-MCNC: 8.1 G/DL (ref 6–8.4)
RBC # BLD AUTO: 4.83 M/UL (ref 4–5.4)
RBC # BLD AUTO: 4.83 M/UL (ref 4–5.4)
SODIUM SERPL-SCNC: 141 MMOL/L (ref 136–145)
T4 FREE SERPL-MCNC: 0.66 NG/DL (ref 0.71–1.51)
T4 SERPL-MCNC: 4.5 UG/DL (ref 4.5–11.5)
TSH SERPL DL<=0.005 MIU/L-ACNC: 1.91 UIU/ML (ref 0.4–4)
TSH SERPL DL<=0.005 MIU/L-ACNC: 1.91 UIU/ML (ref 0.4–4)
VIT B12 SERPL-MCNC: 1202 PG/ML (ref 210–950)
WBC # BLD AUTO: 5.08 K/UL (ref 3.9–12.7)
WBC # BLD AUTO: 5.08 K/UL (ref 3.9–12.7)

## 2022-05-19 PROCEDURE — 84252 ASSAY OF VITAMIN B-2: CPT | Performed by: PHYSICIAN ASSISTANT

## 2022-05-19 PROCEDURE — 85652 RBC SED RATE AUTOMATED: CPT | Performed by: INTERNAL MEDICINE

## 2022-05-19 PROCEDURE — 99999 PR PBB SHADOW E&M-EST. PATIENT-LVL IV: CPT | Mod: PBBFAC,,, | Performed by: PHYSICIAN ASSISTANT

## 2022-05-19 PROCEDURE — 3075F PR MOST RECENT SYSTOLIC BLOOD PRESS GE 130-139MM HG: ICD-10-PCS | Mod: CPTII,S$GLB,, | Performed by: PHYSICIAN ASSISTANT

## 2022-05-19 PROCEDURE — 82607 VITAMIN B-12: CPT | Mod: 91 | Performed by: INTERNAL MEDICINE

## 2022-05-19 PROCEDURE — 87522 HEPATITIS C REVRS TRNSCRPJ: CPT | Performed by: PHYSICIAN ASSISTANT

## 2022-05-19 PROCEDURE — 82746 ASSAY OF FOLIC ACID SERUM: CPT | Performed by: INTERNAL MEDICINE

## 2022-05-19 PROCEDURE — 3008F BODY MASS INDEX DOCD: CPT | Mod: CPTII,S$GLB,, | Performed by: PHYSICIAN ASSISTANT

## 2022-05-19 PROCEDURE — 99999 PR PBB SHADOW E&M-EST. PATIENT-LVL IV: ICD-10-PCS | Mod: PBBFAC,,, | Performed by: PHYSICIAN ASSISTANT

## 2022-05-19 PROCEDURE — 86803 HEPATITIS C AB TEST: CPT | Performed by: INTERNAL MEDICINE

## 2022-05-19 PROCEDURE — 86705 HEP B CORE ANTIBODY IGM: CPT | Performed by: INTERNAL MEDICINE

## 2022-05-19 PROCEDURE — 84436 ASSAY OF TOTAL THYROXINE: CPT | Performed by: PHYSICIAN ASSISTANT

## 2022-05-19 PROCEDURE — 83036 HEMOGLOBIN GLYCOSYLATED A1C: CPT | Performed by: INTERNAL MEDICINE

## 2022-05-19 PROCEDURE — 84443 ASSAY THYROID STIM HORMONE: CPT | Performed by: INTERNAL MEDICINE

## 2022-05-19 PROCEDURE — 86140 C-REACTIVE PROTEIN: CPT | Performed by: INTERNAL MEDICINE

## 2022-05-19 PROCEDURE — 80321 ALCOHOLS BIOMARKERS 1OR 2: CPT | Performed by: PHYSICIAN ASSISTANT

## 2022-05-19 PROCEDURE — 84207 ASSAY OF VITAMIN B-6: CPT | Performed by: PHYSICIAN ASSISTANT

## 2022-05-19 PROCEDURE — 84425 ASSAY OF VITAMIN B-1: CPT | Performed by: PHYSICIAN ASSISTANT

## 2022-05-19 PROCEDURE — 99204 PR OFFICE/OUTPT VISIT, NEW, LEVL IV, 45-59 MIN: ICD-10-PCS | Mod: S$GLB,,, | Performed by: PHYSICIAN ASSISTANT

## 2022-05-19 PROCEDURE — 87340 HEPATITIS B SURFACE AG IA: CPT | Performed by: INTERNAL MEDICINE

## 2022-05-19 PROCEDURE — 3080F DIAST BP >= 90 MM HG: CPT | Mod: CPTII,S$GLB,, | Performed by: PHYSICIAN ASSISTANT

## 2022-05-19 PROCEDURE — 36415 COLL VENOUS BLD VENIPUNCTURE: CPT | Performed by: PHYSICIAN ASSISTANT

## 2022-05-19 PROCEDURE — 85025 COMPLETE CBC W/AUTO DIFF WBC: CPT | Performed by: INTERNAL MEDICINE

## 2022-05-19 PROCEDURE — 1160F RVW MEDS BY RX/DR IN RCRD: CPT | Mod: CPTII,S$GLB,, | Performed by: PHYSICIAN ASSISTANT

## 2022-05-19 PROCEDURE — 84165 PROTEIN E-PHORESIS SERUM: CPT | Mod: 26,,, | Performed by: PATHOLOGY

## 2022-05-19 PROCEDURE — 86706 HEP B SURFACE ANTIBODY: CPT | Performed by: INTERNAL MEDICINE

## 2022-05-19 PROCEDURE — 86592 SYPHILIS TEST NON-TREP QUAL: CPT | Performed by: PHYSICIAN ASSISTANT

## 2022-05-19 PROCEDURE — 3075F SYST BP GE 130 - 139MM HG: CPT | Mod: CPTII,S$GLB,, | Performed by: PHYSICIAN ASSISTANT

## 2022-05-19 PROCEDURE — 82607 VITAMIN B-12: CPT | Performed by: PHYSICIAN ASSISTANT

## 2022-05-19 PROCEDURE — 82300 ASSAY OF CADMIUM: CPT | Performed by: PHYSICIAN ASSISTANT

## 2022-05-19 PROCEDURE — 83921 ORGANIC ACID SINGLE QUANT: CPT | Performed by: PHYSICIAN ASSISTANT

## 2022-05-19 PROCEDURE — 3080F PR MOST RECENT DIASTOLIC BLOOD PRESSURE >= 90 MM HG: ICD-10-PCS | Mod: CPTII,S$GLB,, | Performed by: PHYSICIAN ASSISTANT

## 2022-05-19 PROCEDURE — 1159F MED LIST DOCD IN RCRD: CPT | Mod: CPTII,S$GLB,, | Performed by: PHYSICIAN ASSISTANT

## 2022-05-19 PROCEDURE — 84165 PROTEIN E-PHORESIS SERUM: CPT | Performed by: INTERNAL MEDICINE

## 2022-05-19 PROCEDURE — 1159F PR MEDICATION LIST DOCUMENTED IN MEDICAL RECORD: ICD-10-PCS | Mod: CPTII,S$GLB,, | Performed by: PHYSICIAN ASSISTANT

## 2022-05-19 PROCEDURE — 80053 COMPREHEN METABOLIC PANEL: CPT | Performed by: INTERNAL MEDICINE

## 2022-05-19 PROCEDURE — 99204 OFFICE O/P NEW MOD 45 MIN: CPT | Mod: S$GLB,,, | Performed by: PHYSICIAN ASSISTANT

## 2022-05-19 PROCEDURE — 3008F PR BODY MASS INDEX (BMI) DOCUMENTED: ICD-10-PCS | Mod: CPTII,S$GLB,, | Performed by: PHYSICIAN ASSISTANT

## 2022-05-19 PROCEDURE — 84165 PATHOLOGIST INTERPRETATION SPE: ICD-10-PCS | Mod: 26,,, | Performed by: PATHOLOGY

## 2022-05-19 PROCEDURE — 87389 HIV-1 AG W/HIV-1&-2 AB AG IA: CPT | Performed by: INTERNAL MEDICINE

## 2022-05-19 PROCEDURE — 1160F PR REVIEW ALL MEDS BY PRESCRIBER/CLIN PHARMACIST DOCUMENTED: ICD-10-PCS | Mod: CPTII,S$GLB,, | Performed by: PHYSICIAN ASSISTANT

## 2022-05-19 PROCEDURE — 84439 ASSAY OF FREE THYROXINE: CPT | Performed by: INTERNAL MEDICINE

## 2022-05-19 NOTE — PROGRESS NOTES
[facility] - NEUROLOGY 7TH [place] [facility], [place]    Date: 5/19/22  Patient Name: Corrie Calix   MRN: [id number]   PCP: Primary Doctor No  Referring Provider: Nelsy Dinero*    Chief Complaint: Neuropathy  Subjective:   Corrie Calix is a 57 y.o. female presenting to clinic today for the evaluation of neuropathy.        HPI:   MsKeyla Calix is a 57 y.o. female with asthma, endometriosis, and bronchiectasis presenting for evaluation of neuropathy. On chart review patient was evaluated by ID and found to have progressive neuropathy beginning in the lower extremities and now progressing to the upper extremities. Patient states that she had some changes in her medications (around 03/06/2022) for her asthma and bronchiectasis and following this she experienced stiffness/pain in both legs; she does state she ran 2.5 miles that day which she normally does not do. She then began experiencing pain and tingling of both of her hands. She reports that in the morning she has more difficulty with strength in both of her hands as well as her legs. She states that she use to be very good at crouching and bending down and now she requires help to stand. She finds in the morning she has difficulty with coordination of her legs and it is unclear if she is weak or if she has pain. The movement irritates the pain and she denies any radiation of the pain down her arm or down her leg. She states she is experiencing some difficulty with her hands making a fist and getting nishant/buttons closed in the morning. She rates her discomfort/pain as 4/10 and relates the sensation to more of a soreness feeling. She states that her hands wake her up frequently at night and cause her pain. She denies any problems swallowing or speaking.         PAST MEDICAL HISTORY:  Past Medical History:   Diagnosis Date    Allergy     Anti-polysaccharide antibody deficiency     Asthma     Pleurisy      Pneumothorax        PAST SURGICAL HISTORY:  Past Surgical History:   Procedure Laterality Date    BRONCHOSCOPY N/A 6/29/2021    Procedure: BRONCHOSCOPY;  [facility];  Location: [facility] OR [address] [place] [address];  Service: Anesthesiology;  Laterality: N/A;    EYE SURGERY      LUNG SURGERY         CURRENT MEDS:  Current Outpatient Medications   Medication Sig Dispense Refill    albuterol (PROVENTIL) 2.5 mg /3 mL (0.083 %) nebulizer solution FOR USE WITH NEBULIZER 3 TIMES A DAY AS NEEDED      cetirizine (ZYRTEC) 10 MG tablet Take 10 mg by mouth once daily.      COMP-AIR NEBULIZER COMPRESSOR Yoselyn use as directed      dupilumab (DUPIXENT PEN) 300 mg/2 mL PnIj Inject 300 mg into the skin every 14 (fourteen) days.      EPINEPHrine (EPIPEN JR) 0.15 mg/0.3 mL pen injection Inject 0.15 mg into the muscle.      immun glob G,IgG,-gly-IgA ov50 (CUVITRU) 8 gram/40 mL (20 %) Soln as directed      montelukast (SINGULAIR) 10 mg tablet Take 10 mg by mouth nightly.      sodium chloride 3% 3 % nebulizer solution Take 4 mLs by nebulization 2 (two) times a day. 240 mL 11    tobramycin, PF, (PIETER) 300 mg/5 mL nebulizer solution Take 5 mLs (300 mg total) by nebulization every 12 (twelve) hours. Take medication for 14 days on, followed by 14 days off 140 mL 11    dextromethorphan-guaiFENesin  mg (MUCINEX DM)  mg per 12 hr tablet Take 1 tablet by mouth 2 (two) times daily as needed.      fluticasone propionate (FLONASE) 50 mcg/actuation nasal spray PLACE 2 SPRAYS INTO EACH NOSTRIL ONCE DAILY      fluticasone-salmeterol 230-21 mcg/dose (ADVAIR HFA) 230-21 mcg/actuation HFAA inhaler Inhale 2 puffs into the lungs 2 (two) times a day.       No current facility-administered medications for this visit.       ALLERGIES:  Review of patient's allergies indicates:   Allergen Reactions    Jethro-dur Other (See Comments)    Theophylline Nausea And Vomiting and Other (See Comments)       FAMILY HISTORY:  Family History  "  Problem Relation Age of Onset    Cancer Mother     No Known Problems Father        SOCIAL HISTORY:  Social History     Tobacco Use    Smoking status: Former Smoker    Smokeless tobacco: Never Used   Substance Use Topics    Alcohol use: Never    Drug use: Never       Review of Systems:  Review of Systems   Constitutional: Negative for chills, fever and weight loss.   HENT: Negative for ear discharge, ear pain, hearing loss, nosebleeds and tinnitus.    Eyes: Negative for blurred vision, double vision and photophobia.   Respiratory: Positive for cough, shortness of breath and wheezing. Negative for sputum production.    Cardiovascular: Negative for chest pain, palpitations and orthopnea.   Gastrointestinal: Negative for abdominal pain, diarrhea, heartburn, nausea and vomiting.   Genitourinary: Negative for dysuria, frequency and urgency.   Neurological: Positive for tingling and focal weakness. Negative for tremors, sensory change, seizures and loss of consciousness.            Objective:     Vitals:    05/19/22 1113   BP: (!) 133/92   Pulse: 83   Weight: 47.3 kg (104 lb 2.7 oz)   Height: 5' 7" (1.702 m)         NEUROLOGICAL EXAMINATION:     MENTAL STATUS   Oriented to person, place, and time.   Level of consciousness: alert    CRANIAL NERVES     CN III, IV, VI   Pupils are equal, round, and reactive to light.  Extraocular motions are normal.     CN V   Facial sensation intact.     CN VII   Facial expression full, symmetric.     CN VIII   CN VIII normal.     CN IX, X   CN IX normal.   CN X normal.     CN XII   CN XII normal.     MOTOR EXAM   Muscle bulk: normal (patient is slight but not overly so)    Strength   Right neck flexion: 4/5  Left neck flexion: 4/5  Right neck extension: 4/5  Left neck extension: 4/5  Right deltoid: 4/5  Left deltoid: 4/5  Right biceps: 4/5  Left biceps: 4/5  Right triceps: 4/5  Left triceps: 4/5  Right wrist flexion: 4/5  Left wrist flexion: 4/5  Right wrist extension: 4/5  Left " wrist extension: 4/5  Right interossei: 4/5  Left interossei: 4/5  Right abdominals: 4/5  Left abdominals: 4/5  Right iliopsoas: 4/5  Left iliopsoas: 4/5  Right quadriceps: 4/5  Left quadriceps: 4/5  Right hamstrin/5  Left hamstrin/5  Right glutei: 4/5  Left glutei: 4/5  Right anterior tibial: 4/5  Left anterior tibial: 4/5  Right posterior tibial: 4/5  Left posterior tibial: 4/5  Right peroneal: 4/5  Left peroneal: 4/5  Right gastroc: 4/5  Left gastroc: 4/5    REFLEXES     Reflexes   Right brachioradialis: 2+  Left brachioradialis: 2+  Right biceps: 2+  Left biceps: 2+  Right triceps: 2+  Left triceps: 2+  Right patellar: 2+  Left patellar: 2+  Right achilles: 2+  Left achilles: 2+  Right : 2+  Left : 2+  Right plantar: normal  Left plantar: normal  Right Davis: absent  Left Davis: absent  Right ankle clonus: absent  Left ankle clonus: absent    SENSORY EXAM   Light touch normal.   Vibration normal.   Proprioception normal.   Pinprick normal.     GAIT AND COORDINATION     Gait  Gait: normal     Coordination   Finger to nose coordination: normal  Heel to shin coordination: normal  Tandem walking coordination: normal    Tremor   Resting tremor: absent  Intention tremor: absent  Action tremor: absent    ? ?        Assessment:   Corrie Calix is a 57 y.o. female presenting for evaluation of neuropathy.    Plan:   Lab orders have been placed for evaluation of common causes of neuropathy and I have ordered some other labs for evaluation as well. Orders placed for an EMG evaluation of the bilateral upper extremities for further work up. Patient was amenable to this plan.     Once EMG completed patient to follow up for discussion of the results.     Problem List Items Addressed This Visit        Pulmonary    Adult bronchiectasis    Current Assessment & Plan     Managed by Pulmonology and stable           Asthma    Current Assessment & Plan     Patient stable and managed by Pulmonology                Renal/    Endometriosis    Current Assessment & Plan     Patient stable and well managed by gynecology              Other Visit Diagnoses     Neuropathy    -  Primary    Relevant Orders    EMG W/ ULTRASOUND AND NERVE CONDUCTION TEST 2 Extremities    CBC Auto Differential    Heavy Metals Screen, Blood (Quantitative)    Hepatitis C RNA, Quantitative, PCR    Hemoglobin A1C    Methylmalonic Acid, Serum    Phosphatidylethanol (PETH)    RPR    Sedimentation rate    T4    Vitamin B1    TSH    Vitamin B12 Deficiency Panel    Vitamin B2    Vitamin B6            I spent a total of 45 minutes on the day of the visit. This includes face to face time and non-face to face time preparing to see the patient (eg, review of tests), obtaining and/or reviewing separately obtained history, documenting clinical information in the electronic or other health record, independently interpreting results and communicating results to the patient/family/caregiver, or care coordinator.    Guerita Mosley PA-C  Supervising physician Kentrell Crowe MD was avalible for all questions during this exam.  [facility]

## 2022-05-20 LAB
ALBUMIN SERPL ELPH-MCNC: 4.46 G/DL (ref 3.35–5.55)
ALPHA1 GLOB SERPL ELPH-MCNC: 0.21 G/DL (ref 0.17–0.41)
ALPHA2 GLOB SERPL ELPH-MCNC: 0.65 G/DL (ref 0.43–0.99)
ARSENIC BLD-MCNC: <1 NG/ML
B-GLOBULIN SERPL ELPH-MCNC: 0.74 G/DL (ref 0.5–1.1)
CADMIUM BLD-MCNC: 0.4 NG/ML
CITY: NORMAL
COUNTY: NORMAL
GAMMA GLOB SERPL ELPH-MCNC: 1.74 G/DL (ref 0.67–1.58)
GUARDIAN FIRST NAME: NORMAL
GUARDIAN LAST NAME: NORMAL
HOME PHONE: NORMAL
LEAD BLD-MCNC: 2.6 MCG/DL
MERCURY BLD-MCNC: 3 NG/ML
PATHOLOGIST INTERPRETATION SPE: NORMAL
PROT SERPL-MCNC: 7.8 G/DL (ref 6–8.4)
RACE: NORMAL
RPR SER QL: NORMAL
STATE: NORMAL
STREET ADDRESS: NORMAL
VENOUS/CAPILLARY: NORMAL
ZIP: NORMAL

## 2022-05-21 LAB
HCV RNA SERPL NAA+PROBE-ACNC: NORMAL IU/ML
PETH 16:0/18.1 (POPETH): 147 NG/ML
PETH 16:0/18.2 (PLPETH): 143 NG/ML
VIT B12 SERPL-MCNC: 1011 NG/L (ref 180–914)

## 2022-05-23 LAB
HBV CORE IGM SERPL QL IA: NEGATIVE
HBV SURFACE AB SER-ACNC: POSITIVE M[IU]/ML
HBV SURFACE AG SERPL QL IA: NEGATIVE
HCV AB SERPL QL IA: NEGATIVE

## 2022-05-24 LAB
HIV 1+2 AB+HIV1 P24 AG SERPL QL IA: NEGATIVE
METHYLMALONATE SERPL-SCNC: 0.23 UMOL/L
VIT B2 SERPL-MCNC: 30 MCG/L (ref 1–19)

## 2022-05-25 ENCOUNTER — PATIENT MESSAGE (OUTPATIENT)
Dept: NEUROLOGY | Facility: CLINIC | Age: 57
End: 2022-05-25
Payer: COMMERCIAL

## 2022-05-25 LAB
PYRIDOXAL SERPL-MCNC: 106 UG/L (ref 5–50)
VIT B1 BLD-MCNC: 141 UG/L (ref 38–122)

## 2022-05-31 ENCOUNTER — PATIENT MESSAGE (OUTPATIENT)
Dept: NEUROLOGY | Facility: CLINIC | Age: 57
End: 2022-05-31
Payer: COMMERCIAL

## 2022-05-31 DIAGNOSIS — M25.40 JOINT SWELLING: Primary | ICD-10-CM

## 2022-06-08 ENCOUNTER — HOSPITAL ENCOUNTER (OUTPATIENT)
Dept: RADIOLOGY | Facility: HOSPITAL | Age: 57
Discharge: HOME | End: 2022-06-08
Attending: INTERNAL MEDICINE
Payer: COMMERCIAL

## 2022-06-08 ENCOUNTER — OFFICE VISIT (OUTPATIENT)
Dept: RHEUMATOLOGY | Facility: CLINIC | Age: 57
End: 2022-06-08
Payer: COMMERCIAL

## 2022-06-08 VITALS
DIASTOLIC BLOOD PRESSURE: 86 MMHG | SYSTOLIC BLOOD PRESSURE: 133 MMHG | WEIGHT: 107.81 LBS | HEART RATE: 78 BPM | HEIGHT: 67 IN | BODY MASS INDEX: 16.92 KG/M2

## 2022-06-08 DIAGNOSIS — G62.9 NEUROPATHY: ICD-10-CM

## 2022-06-08 DIAGNOSIS — M25.40 JOINT SWELLING: ICD-10-CM

## 2022-06-08 DIAGNOSIS — M79.10 MYALGIA: ICD-10-CM

## 2022-06-08 DIAGNOSIS — G62.9 NEUROPATHY: Primary | ICD-10-CM

## 2022-06-08 DIAGNOSIS — J45.909 ASTHMA, UNSPECIFIED ASTHMA SEVERITY, UNSPECIFIED WHETHER COMPLICATED, UNSPECIFIED WHETHER PERSISTENT: ICD-10-CM

## 2022-06-08 PROCEDURE — 73130 X-RAY EXAM OF HAND: CPT | Mod: 26,50,, | Performed by: INTERNAL MEDICINE

## 2022-06-08 PROCEDURE — 72040 X-RAY EXAM NECK SPINE 2-3 VW: CPT | Mod: 26,,, | Performed by: RADIOLOGY

## 2022-06-08 PROCEDURE — 73130 X-RAY EXAM OF HAND: CPT | Mod: TC,50

## 2022-06-08 PROCEDURE — 73130 XR HAND COMPLETE 3 VIEWS BILATERAL: ICD-10-PCS | Mod: 26,50,, | Performed by: INTERNAL MEDICINE

## 2022-06-08 PROCEDURE — 99204 PR OFFICE/OUTPT VISIT, NEW, LEVL IV, 45-59 MIN: ICD-10-PCS | Mod: S$GLB,,, | Performed by: INTERNAL MEDICINE

## 2022-06-08 PROCEDURE — 71046 X-RAY EXAM CHEST 2 VIEWS: CPT | Mod: TC

## 2022-06-08 PROCEDURE — 71046 XR CHEST PA AND LATERAL: ICD-10-PCS | Mod: 26,,, | Performed by: STUDENT IN AN ORGANIZED HEALTH CARE EDUCATION/TRAINING PROGRAM

## 2022-06-08 PROCEDURE — 99999 PR PBB SHADOW E&M-EST. PATIENT-LVL V: CPT | Mod: PBBFAC,,, | Performed by: INTERNAL MEDICINE

## 2022-06-08 PROCEDURE — 73030 X-RAY EXAM OF SHOULDER: CPT | Mod: TC,LT

## 2022-06-08 PROCEDURE — 3008F PR BODY MASS INDEX (BMI) DOCUMENTED: ICD-10-PCS | Mod: CPTII,S$GLB,, | Performed by: INTERNAL MEDICINE

## 2022-06-08 PROCEDURE — 99999 PR PBB SHADOW E&M-EST. PATIENT-LVL V: ICD-10-PCS | Mod: PBBFAC,,, | Performed by: INTERNAL MEDICINE

## 2022-06-08 PROCEDURE — 99204 OFFICE O/P NEW MOD 45 MIN: CPT | Mod: S$GLB,,, | Performed by: INTERNAL MEDICINE

## 2022-06-08 PROCEDURE — 1159F PR MEDICATION LIST DOCUMENTED IN MEDICAL RECORD: ICD-10-PCS | Mod: CPTII,S$GLB,, | Performed by: INTERNAL MEDICINE

## 2022-06-08 PROCEDURE — 71046 X-RAY EXAM CHEST 2 VIEWS: CPT | Mod: 26,,, | Performed by: STUDENT IN AN ORGANIZED HEALTH CARE EDUCATION/TRAINING PROGRAM

## 2022-06-08 PROCEDURE — 72040 XR CERVICAL SPINE AP LATERAL: ICD-10-PCS | Mod: 26,,, | Performed by: RADIOLOGY

## 2022-06-08 PROCEDURE — 73030 XR SHOULDER COMPLETE 2 OR MORE VIEWS LEFT: ICD-10-PCS | Mod: 26,LT,, | Performed by: RADIOLOGY

## 2022-06-08 PROCEDURE — 1160F PR REVIEW ALL MEDS BY PRESCRIBER/CLIN PHARMACIST DOCUMENTED: ICD-10-PCS | Mod: CPTII,S$GLB,, | Performed by: INTERNAL MEDICINE

## 2022-06-08 PROCEDURE — 73030 X-RAY EXAM OF SHOULDER: CPT | Mod: 26,LT,, | Performed by: RADIOLOGY

## 2022-06-08 PROCEDURE — 1159F MED LIST DOCD IN RCRD: CPT | Mod: CPTII,S$GLB,, | Performed by: INTERNAL MEDICINE

## 2022-06-08 PROCEDURE — 3044F HG A1C LEVEL LT 7.0%: CPT | Mod: CPTII,S$GLB,, | Performed by: INTERNAL MEDICINE

## 2022-06-08 PROCEDURE — 3008F BODY MASS INDEX DOCD: CPT | Mod: CPTII,S$GLB,, | Performed by: INTERNAL MEDICINE

## 2022-06-08 PROCEDURE — 1160F RVW MEDS BY RX/DR IN RCRD: CPT | Mod: CPTII,S$GLB,, | Performed by: INTERNAL MEDICINE

## 2022-06-08 PROCEDURE — 3044F PR MOST RECENT HEMOGLOBIN A1C LEVEL <7.0%: ICD-10-PCS | Mod: CPTII,S$GLB,, | Performed by: INTERNAL MEDICINE

## 2022-06-08 PROCEDURE — 72040 X-RAY EXAM NECK SPINE 2-3 VW: CPT | Mod: TC

## 2022-06-08 RX ORDER — GABAPENTIN 100 MG/1
100 CAPSULE ORAL 3 TIMES DAILY
Qty: 90 CAPSULE | Refills: 11 | Status: SHIPPED | OUTPATIENT
Start: 2022-06-08 | End: 2023-06-08

## 2022-06-08 ASSESSMENT — ROUTINE ASSESSMENT OF PATIENT INDEX DATA (RAPID3)
PAIN SCORE: 7.5
FATIGUE SCORE: 6.5
TOTAL RAPID3 SCORE: 6.44
WHEN YOU AWAKENED IN THE MORNING OVER THE LAST WEEK, PLEASE INDICATE THE AMOUNT OF TIME IT TAKES UNTIL YOU ARE AS LIMBER AS YOU WILL BE FOR THE DAY: 3 HOURS
MDHAQ FUNCTION SCORE: 1.3
PATIENT GLOBAL ASSESSMENT SCORE: 7.5
AM STIFFNESS SCORE: 1, YES
PSYCHOLOGICAL DISTRESS SCORE: 4.4

## 2022-06-08 NOTE — PROGRESS NOTES
Rapid3 Question Responses and Scores 6/1/2022   MDHAQ Score 1.3   Psychologic Score 4.4   Pain Score 7.5   When you awakened in the morning OVER THE LAST WEEK, did you feel stiff? Yes   If Yes, please indicate the number of hours until you are as limber as you will be for the day 3   Fatigue Score 6.5   Global Health Score 7.5   RAPID3 Score 6.44

## 2022-06-09 ENCOUNTER — PATIENT MESSAGE (OUTPATIENT)
Dept: RHEUMATOLOGY | Facility: CLINIC | Age: 57
End: 2022-06-09
Payer: COMMERCIAL

## 2022-06-09 NOTE — PROGRESS NOTES
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  She also has a history of asthma and is on Singulair and Dupixent.  She has chronic bronchiectasis.  She has been on inhaled antibiotics for a Pseudomonas infection for 2 years.  She has a long history of neck stiffness.    In March she developed the sudden onset of aching and weakness in her legs.  She had trouble running.  It then spread to the hands.  She calls it a burning sensation.  There has been some numbness in her hands and feet.  The problem is worse in the morning.  She has difficulty using her hands and difficulty walking.  She has had some swelling in her feet but not in other joints.  She has had no similar problem in the past.  She had no antecedent URI nor has she received a recent vaccine.  She has no nocturnal pain.    She tried ibuprofen without response.  Heat has been helping.  She had no response to topical medications including CBD oil.    She has had no recent fevers.  She has had a recurrent rash which sounds like urticaria.  It may last up to 2 days.  It is pruritic.  She has had no headache.  She complains of red in she eyes for the past year.  She has had no ocular pain.  She has not seen her and eye doctor.  She has had no oral ulcers.  She states her eyes are dry but not her mouth.  She has occasional pleuritic chest pain.  She has no Raynaud's phenomena, chronic or bloody diarrhea, vaginal discharge or ulcers, history of thrombophlebitis.  She has never been pregnant.  Her paternal grandmother had osteoarthritis, her maternal grandmother had a neuropathy.    Systems review:  General:  Weight has been stable  ENT:  No change in vision or hearing  GI:  No abdominal pain or peptic ulcer disease.  No liver problems.  :  No kidney or bladder problems    Physical examination:  Skin:  No rashes  ENT:  No conjunctivitis or oral ulcers.  Adequate tears in saliva.  Chest:  Clear  to auscultation   Cardiac:  No murmurs, gallops, rubs  Abdomen:  No organomegaly or masses.  No tenderness to palpation  Extremities:  No pedal edema  Musculoskeletal:  She has decreased range of motion of the cervical spine but no pain on range of motion.  She has tenderness of the left shoulder.  She has bony hypertrophy.  She has decreased range of motion.  She has no effusion.  She has tenderness of the elbows bilaterally but no swelling.  She has bony hypertrophy of the 1st CMC and PIP.  She has no synovitis in the hands.  She has a negative Tinel sign at the wrist.  Lumbar spine has good range of motion without pain on range of motion.  Hips and knees are unremarkable.  She has pain on range of motion of the ankles bilaterally.  She has no tenderness or swelling.  She has bony hypertrophy of the 1st MTP bilaterally.  Neurologic:  She has normal muscle strength testing  Laboratory:  She had normal sed rate and CRP.  She had negative KATERIN and rheumatoid factor.  She has increased eosinophils.    Assessment:  1. She has underline immuno deficiency with chronic infection  2. She has a history of asthma since childhood.  She may also have urticaria.  3. She has underlying osteoarthritis  4. She has a new onset of numbness.  She is scheduled for a nerve conduction study.  5. Given her history, I am concern we may be dealing with EGPA    Plans:  1. Laboratory studies obtained  2. I have ordered multiple x-rays  3. I placed her on gabapentin 100 mg 3 times daily for symptomatic relief  4. I wish to see her in follow-up after she has had her nerve conduction study.      Answers for HPI/ROS submitted by the patient on 6/1/2022  fever: No  eye redness: Yes  mouth sores: No  headaches: No  shortness of breath: Yes  chest pain: No  trouble swallowing: No  diarrhea: No  constipation: No  unexpected weight change: No  genital sore: No  dysuria: No  During the last 3 days, have you had a skin rash?: Yes  Bruises or bleeds  easily: Yes  cough: Yes

## 2022-06-21 ENCOUNTER — OFFICE VISIT (OUTPATIENT)
Dept: INTERNAL MEDICINE | Facility: CLINIC | Age: 57
End: 2022-06-21
Payer: COMMERCIAL

## 2022-06-21 VITALS
DIASTOLIC BLOOD PRESSURE: 70 MMHG | WEIGHT: 105 LBS | HEIGHT: 67 IN | OXYGEN SATURATION: 97 % | HEART RATE: 84 BPM | SYSTOLIC BLOOD PRESSURE: 126 MMHG | BODY MASS INDEX: 16.48 KG/M2

## 2022-06-21 DIAGNOSIS — M65.4 DE QUERVAIN'S TENOSYNOVITIS, RIGHT: ICD-10-CM

## 2022-06-21 DIAGNOSIS — D80.6 SPECIFIC ANTIBODY DEFICIENCY WITH NORMAL IG CONCENTRATION AND NORMAL NUMBER OF B CELLS: ICD-10-CM

## 2022-06-21 DIAGNOSIS — M25.649 STIFFNESS OF HAND JOINT, UNSPECIFIED LATERALITY: ICD-10-CM

## 2022-06-21 DIAGNOSIS — J47.9 ADULT BRONCHIECTASIS: Primary | ICD-10-CM

## 2022-06-21 DIAGNOSIS — G62.9 PERIPHERAL POLYNEUROPATHY: ICD-10-CM

## 2022-06-21 PROCEDURE — 3078F PR MOST RECENT DIASTOLIC BLOOD PRESSURE < 80 MM HG: ICD-10-PCS | Mod: CPTII,S$GLB,, | Performed by: INTERNAL MEDICINE

## 2022-06-21 PROCEDURE — 1159F MED LIST DOCD IN RCRD: CPT | Mod: CPTII,S$GLB,, | Performed by: INTERNAL MEDICINE

## 2022-06-21 PROCEDURE — 99214 OFFICE O/P EST MOD 30 MIN: CPT | Mod: S$GLB,,, | Performed by: INTERNAL MEDICINE

## 2022-06-21 PROCEDURE — 3008F PR BODY MASS INDEX (BMI) DOCUMENTED: ICD-10-PCS | Mod: CPTII,S$GLB,, | Performed by: INTERNAL MEDICINE

## 2022-06-21 PROCEDURE — 1160F PR REVIEW ALL MEDS BY PRESCRIBER/CLIN PHARMACIST DOCUMENTED: ICD-10-PCS | Mod: CPTII,S$GLB,, | Performed by: INTERNAL MEDICINE

## 2022-06-21 PROCEDURE — 3008F BODY MASS INDEX DOCD: CPT | Mod: CPTII,S$GLB,, | Performed by: INTERNAL MEDICINE

## 2022-06-21 PROCEDURE — 3044F PR MOST RECENT HEMOGLOBIN A1C LEVEL <7.0%: ICD-10-PCS | Mod: CPTII,S$GLB,, | Performed by: INTERNAL MEDICINE

## 2022-06-21 PROCEDURE — 1159F PR MEDICATION LIST DOCUMENTED IN MEDICAL RECORD: ICD-10-PCS | Mod: CPTII,S$GLB,, | Performed by: INTERNAL MEDICINE

## 2022-06-21 PROCEDURE — 1160F RVW MEDS BY RX/DR IN RCRD: CPT | Mod: CPTII,S$GLB,, | Performed by: INTERNAL MEDICINE

## 2022-06-21 PROCEDURE — 99214 PR OFFICE/OUTPT VISIT, EST, LEVL IV, 30-39 MIN: ICD-10-PCS | Mod: S$GLB,,, | Performed by: INTERNAL MEDICINE

## 2022-06-21 PROCEDURE — 99999 PR PBB SHADOW E&M-EST. PATIENT-LVL V: CPT | Mod: PBBFAC,,, | Performed by: INTERNAL MEDICINE

## 2022-06-21 PROCEDURE — 3078F DIAST BP <80 MM HG: CPT | Mod: CPTII,S$GLB,, | Performed by: INTERNAL MEDICINE

## 2022-06-21 PROCEDURE — 99999 PR PBB SHADOW E&M-EST. PATIENT-LVL V: ICD-10-PCS | Mod: PBBFAC,,, | Performed by: INTERNAL MEDICINE

## 2022-06-21 PROCEDURE — 3044F HG A1C LEVEL LT 7.0%: CPT | Mod: CPTII,S$GLB,, | Performed by: INTERNAL MEDICINE

## 2022-06-21 PROCEDURE — 3074F PR MOST RECENT SYSTOLIC BLOOD PRESSURE < 130 MM HG: ICD-10-PCS | Mod: CPTII,S$GLB,, | Performed by: INTERNAL MEDICINE

## 2022-06-21 PROCEDURE — 3074F SYST BP LT 130 MM HG: CPT | Mod: CPTII,S$GLB,, | Performed by: INTERNAL MEDICINE

## 2022-06-21 RX ORDER — NAPROXEN 500 MG/1
500 TABLET ORAL 2 TIMES DAILY WITH MEALS
Qty: 28 TABLET | Refills: 0 | Status: SHIPPED | OUTPATIENT
Start: 2022-06-21 | End: 2022-07-15 | Stop reason: SDUPTHER

## 2022-06-21 NOTE — PROGRESS NOTES
"    CHIEF COMPLAINT     Chief Complaint   Patient presents with    [facility]       HPI     Corrie Calix is a 57 y.o. female history of asthma, specific antibody deficiency on IVIG bronchiectasis here today for new patient exam.  Patient previously well controlled on IVIG was diagnosed with Pseudomonas in May of 2021.  She was treated with Levaquin.  Also placed on inhaled tobramycin with improvement.  Reports that her insurance changed around February and had issues getting her IVIG and tobramycin.  Resume medication with improvement in functional status.    She was seen by her ID doctor in May with new onset progressive neuropathy started lower extremities and has progressed in upper extremities.  Reports bilateral hand stiffness that improves with activity over the course of the day.  She has been seen by Neurology and Rheumatology with negative workup thus far.  She is scheduled for EMG use in a couple weeks.    Also having acute right-sided wrist pain the past several weeks.  Pain is exacerbated with radial deviation.  She attributes worsening of symptoms holding her grandchild.    Personally Reviewed Patient's Medical, surgical, family and social hx. Changes updated in [facility].  Care Team updated in Epic    Review of Systems:  Review of Systems   Respiratory: Positive for cough and shortness of breath.    Musculoskeletal: Positive for arthralgias.   Neurological: Positive for numbness.       Health Maintenance:   Reviewed with patient  Due for the following:      PHYSICAL EXAM     /70 (BP Location: Right arm, Patient Position: Sitting, BP Method: Medium (Manual))   Pulse 84   Ht 5' 7" (1.702 m)   Wt 47.6 kg (105 lb)   SpO2 97%   BMI 16.45 kg/m²     Gen: Well Appearing, NAD  HEENT: PERR, EOMI  Neck: FROM, no thyromegaly, no cervical adenopathy  CVD: RRR, no M/R/G  Pulm: Normal work of breathing, CTAB, no wheezing  Abd:  Soft, NT, ND non TTP, no mass  MSK:   Positive Finkelstein right wrist, 4/5 "  strength bilaterally  Neuro: A&Ox3, gait normal, speech normal  Mood; Mood normal, behavior normal, thought process linear       LABS     Labs reviewed;   Lab Results   Component Value Date    WBC 5.08 05/19/2022    WBC 5.08 05/19/2022    HGB 14.4 05/19/2022    HGB 14.4 05/19/2022    HCT 42.8 05/19/2022    HCT 42.8 05/19/2022    MCV 89 05/19/2022    MCV 89 05/19/2022     05/19/2022     05/19/2022         Chemistry        Component Value Date/Time     05/19/2022 1212    K 3.6 05/19/2022 1212     05/19/2022 1212    CO2 24 05/19/2022 1212    BUN 12 05/19/2022 1212    CREATININE 0.7 05/19/2022 1212    GLU 83 05/19/2022 1212        Component Value Date/Time    CALCIUM 10.1 05/19/2022 1212    ALKPHOS 71 05/19/2022 1212    AST 35 05/19/2022 1212    ALT 26 05/19/2022 1212    BILITOT 1.9 (H) 05/19/2022 1212    ESTGFRAFRICA >60.0 05/19/2022 1212    EGFRNONAA >60.0 05/19/2022 1212        Lab Results   Component Value Date    HGBA1C 5.4 05/19/2022    HGBA1C 5.4 05/19/2022     No results found for: LDLCALC      ASSESSMENT     1. Adult bronchiectasis     2. 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 Peripheral polyneuropathy     4. Stiffness of hand joint, unspecified laterality  naproxen (NAPROSYN) 500 MG tablet    Ambulatory referral/consult to Physical/Occupational Therapy    Ambulatory referral/consult to Hand Surgery   5. De Quervain's tenosynovitis, right  Ambulatory referral/consult to Hand Surgery           Plan     Corrie Calix is a 57 y.o. female with asthma specific antibody deficiency, with adult bronchiectasis, and new onset peripheral neuropathy. While hand sympmtoms sound primarily neuropathic. I think she also has deQuervain's in R wrist. Will treat this acutely and follow up EMG.      1. Adult bronchiectasis  Followed by pulmonary and infectious disease  Continue tobramycin   Continue percussive vest    2. Specific antibody  deficiency with normal IG concentration and normal number of B cells  Followed by allergist Elisha continue IVIG  Continue Dupixent    3. Peripheral polyneuropathy  Unclear etiology thus far, hand numbness the were so far    4. Stiffness of hand joint, unspecified laterality  Think she would benefit from occupational therapy to help maintain functional status and strength in her hands  - naproxen (NAPROSYN) 500 MG tablet; Take 1 tablet (500 mg total) by mouth 2 (two) times daily with meals.  Dispense: 28 tablet; Refill: 0  - Ambulatory referral/consult to Physical/Occupational Therapy; Future  - Ambulatory referral/consult to Hand Surgery; Future    5. De Quervain's tenosynovitis, right  Will try NSAIDs and thumb spica splint with wrist  Support  Will refer to Hand 1st evaluation steroid injection  - Ambulatory referral/consult to Hand Surgery; Future      Elvis Cody MD

## 2022-06-28 ENCOUNTER — TELEPHONE (OUTPATIENT)
Dept: INTERNAL MEDICINE | Facility: CLINIC | Age: 57
End: 2022-06-28
Payer: COMMERCIAL

## 2022-06-28 ENCOUNTER — TELEPHONE (OUTPATIENT)
Dept: ORTHOPEDICS | Facility: CLINIC | Age: 57
End: 2022-06-28
Payer: COMMERCIAL

## 2022-06-28 NOTE — TELEPHONE ENCOUNTER
----- Message from Apurva Rodriguez sent at 6/28/2022  1:57 PM CDT -----  Type :  Needs Medical Advice    Who Called: pt  Symptoms (please be specific): referral is in  hand   How long has patient had these symptoms:  Stiffness of hand joint, unspecified laterality [M25.649]  De Quervain's tenosynovitis, right   Pharmacy name and phone #:   no    Would the patient rather a call back or a response via [facility]?  call  Best Call Back Number:  [phone]  Additional Information: appt

## 2022-06-28 NOTE — TELEPHONE ENCOUNTER
Pt calling to schedule with hand surgeon. Instructed pt to call scheduling to get appt with hand surgeon. Pt verbalized understanding

## 2022-06-28 NOTE — TELEPHONE ENCOUNTER
----- Message from Shala Lott sent at 6/28/2022  1:28 PM CDT -----  Contact: [phone]  Patient is returning a phone call.  Who left a message for the patient: Teresa Gotti RN     Does patient know what this is regarding:    Would you like a call back, or a response through your [facility] portal?:   call back  Comments:

## 2022-06-29 ENCOUNTER — DOCUMENTATION ONLY (OUTPATIENT)
Dept: REHABILITATION | Facility: HOSPITAL | Age: 57
End: 2022-06-29
Payer: COMMERCIAL

## 2022-06-29 NOTE — PROGRESS NOTES
Cancellation Note    Patient: Corrie BOLAÑOS Sabibikendall  Date of Session: 6/29/2022  MRN: [id number]    Corrie Calix cancelled her scheduled occupational therapy evaluation  today secondary to no transportation.  This is the first evaluation Corrie has not attended.  No charges have been posted today.       ABDI Wong  6/29/2022

## 2022-07-05 ENCOUNTER — OFFICE VISIT (OUTPATIENT)
Dept: ORTHOPEDICS | Facility: CLINIC | Age: 57
End: 2022-07-05
Payer: COMMERCIAL

## 2022-07-05 DIAGNOSIS — M65.4 DE QUERVAIN'S TENOSYNOVITIS, RIGHT: ICD-10-CM

## 2022-07-05 DIAGNOSIS — M25.649 STIFFNESS OF HAND JOINT, UNSPECIFIED LATERALITY: ICD-10-CM

## 2022-07-05 PROCEDURE — 20550 NJX 1 TENDON SHEATH/LIGAMENT: CPT | Mod: RT,S$GLB,, | Performed by: ORTHOPAEDIC SURGERY

## 2022-07-05 PROCEDURE — 99203 OFFICE O/P NEW LOW 30 MIN: CPT | Mod: 25,S$GLB,, | Performed by: ORTHOPAEDIC SURGERY

## 2022-07-05 PROCEDURE — 3044F HG A1C LEVEL LT 7.0%: CPT | Mod: CPTII,S$GLB,, | Performed by: ORTHOPAEDIC SURGERY

## 2022-07-05 PROCEDURE — 3044F PR MOST RECENT HEMOGLOBIN A1C LEVEL <7.0%: ICD-10-PCS | Mod: CPTII,S$GLB,, | Performed by: ORTHOPAEDIC SURGERY

## 2022-07-05 PROCEDURE — 99999 PR PBB SHADOW E&M-EST. PATIENT-LVL II: CPT | Mod: PBBFAC,,, | Performed by: ORTHOPAEDIC SURGERY

## 2022-07-05 PROCEDURE — 99999 PR PBB SHADOW E&M-EST. PATIENT-LVL II: ICD-10-PCS | Mod: PBBFAC,,, | Performed by: ORTHOPAEDIC SURGERY

## 2022-07-05 PROCEDURE — 99203 PR OFFICE/OUTPT VISIT, NEW, LEVL III, 30-44 MIN: ICD-10-PCS | Mod: 25,S$GLB,, | Performed by: ORTHOPAEDIC SURGERY

## 2022-07-05 PROCEDURE — 20550 TENDON SHEATH: ICD-10-PCS | Mod: RT,S$GLB,, | Performed by: ORTHOPAEDIC SURGERY

## 2022-07-05 RX ADMIN — DEXAMETHASONE SODIUM PHOSPHATE 4 MG: 4 INJECTION, SOLUTION INTRA-ARTICULAR; INTRALESIONAL; INTRAMUSCULAR; INTRAVENOUS; SOFT TISSUE at 02:07

## 2022-07-05 NOTE — PROCEDURES
Tendon Sheath    Date/Time: 7/5/2022 2:45 PM  Performed by: Catie Piper MD  Authorized by: Catie Piper MD     Consent Done?:  Yes (Verbal)  Indications:  Pain  Timeout: prior to procedure the correct patient, procedure, and site was verified    Prep: patient was prepped and draped in usual sterile fashion      Local anesthesia used?: Yes    Anesthesia:  Local infiltration  Local anesthetic:  Lidocaine 1% without epinephrine  Location:  Wrist  Site:  R first doral compartment  Ultrasonic guidance for needle placement?: Yes    Needle size:  22 G  Medications:  4 mg dexamethasone 4 mg/mL

## 2022-07-07 ENCOUNTER — PROCEDURE VISIT (OUTPATIENT)
Dept: NEUROLOGY | Facility: CLINIC | Age: 57
End: 2022-07-07
Payer: COMMERCIAL

## 2022-07-07 ENCOUNTER — PATIENT MESSAGE (OUTPATIENT)
Dept: NEUROLOGY | Facility: CLINIC | Age: 57
End: 2022-07-07

## 2022-07-07 DIAGNOSIS — G56.03 BILATERAL CARPAL TUNNEL SYNDROME: Primary | ICD-10-CM

## 2022-07-07 DIAGNOSIS — G62.9 NEUROPATHY: ICD-10-CM

## 2022-07-07 PROCEDURE — 95886 PR EMG COMPLETE, W/ NERVE CONDUCTION STUDIES, 5+ MUSCLES: ICD-10-PCS | Mod: S$GLB,,, | Performed by: PSYCHIATRY & NEUROLOGY

## 2022-07-07 PROCEDURE — 95913 PR NERVE CONDUCTION STUDY; 13 OR MORE STUDIES: ICD-10-PCS | Mod: S$GLB,,, | Performed by: PSYCHIATRY & NEUROLOGY

## 2022-07-07 PROCEDURE — 95886 MUSC TEST DONE W/N TEST COMP: CPT | Mod: S$GLB,,, | Performed by: PSYCHIATRY & NEUROLOGY

## 2022-07-07 PROCEDURE — 95913 NRV CNDJ TEST 13/> STUDIES: CPT | Mod: S$GLB,,, | Performed by: PSYCHIATRY & NEUROLOGY

## 2022-07-07 NOTE — PROCEDURES
Procedures       Please see NCS/EMG procedure report    Kentrell Crowe MD  [facility]

## 2022-07-08 NOTE — PROGRESS NOTES
Subjective:      Corrie Calix is a 57 y.o. female who presents for evaluation of right hand/finger pain.Treatment to date: nothing specific.    Review of Systems  Pertinent items are noted in HPI.      Objective:      There were no vitals taken for this visit.  Right hand:  normal exam, no swelling, tenderness, instability; ligaments intact, full ROM both hands, wrists, and finger joints   Left hand:  normal exam, no swelling, tenderness, instability; ligaments intact, full ROM both hands, wrists, and finger joints     Imaging:  X-ray right hand: no fracture, dislocation, swelling or degenerative changes noted      FINDINGS:  Normal mineralization.  No fracture or dislocation.  Joint spaces are preserved.  No erosions or bony proliferative changes.  There is symmetric juxta-articular soft tissue swelling, for example in middle finger PIP joints bilaterally, nonspecific but could represent joint inflammation.  No soft tissue calcifications.   Assessment:      discussed dupuytren's and joint arthritis      Plan:      Natural history and expected course discussed. Questions answered.

## 2022-07-11 ENCOUNTER — PATIENT MESSAGE (OUTPATIENT)
Dept: NEUROLOGY | Facility: CLINIC | Age: 57
End: 2022-07-11
Payer: COMMERCIAL

## 2022-07-11 ENCOUNTER — CLINICAL SUPPORT (OUTPATIENT)
Dept: REHABILITATION | Facility: HOSPITAL | Age: 57
End: 2022-07-11
Attending: INTERNAL MEDICINE
Payer: COMMERCIAL

## 2022-07-11 ENCOUNTER — TELEPHONE (OUTPATIENT)
Dept: ORTHOPEDICS | Facility: CLINIC | Age: 57
End: 2022-07-11
Payer: COMMERCIAL

## 2022-07-11 DIAGNOSIS — M79.642 PAIN IN BOTH HANDS: ICD-10-CM

## 2022-07-11 DIAGNOSIS — M79.641 PAIN IN BOTH HANDS: ICD-10-CM

## 2022-07-11 DIAGNOSIS — M25.649 STIFFNESS OF HAND JOINT, UNSPECIFIED LATERALITY: ICD-10-CM

## 2022-07-11 PROCEDURE — 97530 THERAPEUTIC ACTIVITIES: CPT | Mod: PO

## 2022-07-11 PROCEDURE — 97022 WHIRLPOOL THERAPY: CPT | Mod: PO

## 2022-07-11 PROCEDURE — 97166 OT EVAL MOD COMPLEX 45 MIN: CPT | Mod: PO

## 2022-07-11 NOTE — TELEPHONE ENCOUNTER
----- Message from Layla Manning sent at 7/11/2022  8:38 AM CDT -----  Regarding: Return call    Type:  Patient Returning Call    Who Called:  JANNET FRANZ [[id number]]    Who Left Message for Patient:  Angela Cruz     Does the patient know what this is regarding?: Y    Can the clinic reply in [facility]: No    Best Call Back Number: [phone]    Additional Information: pt cannot make the 7/19 appt nadia would like to discuss being seen sooner than August.

## 2022-07-11 NOTE — PATIENT INSTRUCTIONS
Heat your hands 10-15 minutes prior to exercising if possible.      Finklestein's Stretch            Place your thumb inside of a fist, slowly bend towards your small finger. Go as far as you can without causing hard, sharp pain. Hold 10 seconds relax, repeat. Perform 10 repetitions 3 times a day.       AROM: Forearm Pronation / Supination        With arm in handshake position, slowly rotate palm down until stretch is felt. Relax. Then rotate palm up until stretch is felt.  Repeat 20 times. Do 3 sessions per day.         Tendon Glides         Start at position A and move through each position slowly attempting to achieve full glide.  A-E is ONE repetition.     Complete 10 reps 3 times per day.       MP Flexion (Active)        Bend thumb to touch base of little finger, keeping tip joint straight.  Repeat 20 times. Do 3 sessions per day.    MP Extension (Active)        With palm on table, lift thumb up. Relax and lower thumb.  Repeat 20 times. Do 3 sessions per day.    Radial Adduction/Abduction (Active)        Move thumb out to side. Move back alongside index finger.  Repeat 20 times. Do 3 sessions per day.       Palmar Adduction/Abduction (Active)        Move thumb down, away from palm. Move back to rest along palm.  Repeat 20 times. Do 3 sessions per day.    Composite Movement Circumduction (Active)        Make circles with thumb clockwise and counter-clockwise.  Repeat 20 times each direction. Do 3 sessions per day.     Circumduction (Active)        With fingers curled, move slowly at wrist in clock- wise circles 20 times. Repeat counterclockwise. Do not move elbow or shoulder.  Do 5 sessions per day.

## 2022-07-11 NOTE — PLAN OF CARE
[facility] Occupational Therapy  Initial Evaluation     Date: 7/11/2022  Patient: Corrie Calix  Chart Number: [id number]  Referring Physician: Elvis Cody MD  Therapy Diagnosis:   1. Stiffness of hand joint, unspecified laterality  Ambulatory referral/consult to Physical/Occupational Therapy   2. Pain in both hands         Medical Diagnosis: M25.649 (ICD-10-CM) - Stiffness of hand joint, unspecified laterality  Physician Orders: Eval/tx  Evaluation Date: 7/11/2022  Plan of Care Certification Date: 10/12/2022  Authorization Period: 12/31/2022  Surgery Date and Procedure: N/A  Date of Return to MD: JHONNY    Visit #: 1 of 1  Time In: 10:30 AM  Time Out: 11:15 AM  Total Billable Time: 45 min    Precautions: Standard,    Subjective     Involved Side: Bilateral  Dominant Side: Right  Date of Onset: 3/2022  History of Current Condition: Pt w/ multiple medical issues developed pain in multiple sites/joints throughout her body including hands. Underwent 1st dorsal compartment CSI on the right w/ some relief  Imaging: Normal mineralization.  No fracture or dislocation.  Joint spaces are preserved.  No erosions or bony proliferative changes.  There is symmetric juxta-articular soft tissue swelling, for example in middle finger PIP joints bilaterally, nonspecific but could represent joint inflammation.  No soft tissue calcifications.  Previous Therapy: None    Patient's Goals for Therapy: Reduce pain    Pain:  Functional Pain Scale Rating 0-10:   2/10 on average  2/10 at best  8/10 at worst  Location: Throughout both  Description: Solid, stiff  Aggravating Factors: Motion  Easing Factors: Rest    Previous Level of function Independent w/ ADL's, work    Current Level of Function Difficulty w/ exercising, gripping/pinching, carrying    Occupation:  Administrative  Working presently: employed  Duties: Clerical    Past Medical History/Physical Systems Review:   Corrie Calix  has a past medical history of  Allergy, Anti-polysaccharide antibody deficiency, Asthma, Pleurisy, and Pneumothorax.    Corrie Calix  has a past surgical history that includes Lung surgery; Eye surgery; and Bronchoscopy (N/A, 6/29/2021).    Corrie has a current medication list which includes the following prescription(s): albuterol, cetirizine, comp-air nebulizer compressor, dupixent pen, epinephrine, gabapentin, cuvitru, montelukast, naproxen, sodium chloride 3%, and tobramycin (pf).    Review of patient's allergies indicates:   Allergen Reactions    Jethro-dur Other (See Comments)    Theophylline Nausea And Vomiting and Other (See Comments)          Objective     Mental status: alert    Observation:   PIP edema both hands      Sensation:   WNL at the time of eval but reports nocturnal numbness      Range of Motion:   WNL all joint planes    Special Tests:   Bilateral   7/11/2022   Thumb CMC Grind Test -   Finkelstein's Test +(right only)   Phalen's Test -   Tinel's Test at wrist -        Strength: (SWATI Dynamometer in psi.)      7/11/2022 7/11/2022    Left Right   Rung II 25 35       Pinch Strength (Measured in psi)     7/11/2022 7/11/2022    Left Right   Key Pinch 11  10    3pt Pinch 7  7        Treatment     Treatment Time In: 11:00 AM  Treatment Time Out: 11:25 AM  Total Treatment time separate from Evaluation time:25 min    Corrie received the following supervised modalities after being cleared for contraindications for 10 minutes:   -Fluidotherapy    Corrie participated in dynamic functional therapeutic activities to improve functional performance for 30  minutes, including:  -HEP and joint protection training    Home Exercise Program/Education:  Issued HEP (see patient instructions in EMR) and educated on modality use for pain management . Exercises were reviewed and Corrie was able to demonstrate them prior to the end of the session.   Pt received a written copy of exercises to perform at home. Corrie demonstrated good   understanding of the education provided.  Pt was advised to perform these exercises free of pain, and to stop performing them if pain occurs.    Patient/Family Education: role of OT, goals for OT, scheduling/cancellations - pt verbalized understanding. Discussed insurance limitations with patient.    Additional Education provided: Use of heat      Assessment     Corrie Calix is a 57 y.o. female presents with limitations as described in problem list. Patient can benefit from Occupational Therapy services for Iontophoresis, ultrasound, moist heat, therapeutic exercises, home exercise program provied with written instructions, ice and strengthening and orthotics, if deemed necessary . The following goals were discussed with the patient and she is in agreement with them as to be addressed in the treatment plan.    The patient's rehab potential is Guarded.     Anticipated barriers to occupational therapy: Unknown etiology of symptoms, multiple body areas involved  Pt has no cultural, educational or language barriers to learning provided.    Profile and History Assessment of Occupational Performance Level of Clinical Decision Making Complexity Score   Occupational Profile:   Corrie Calix is a 57 y.o. female who is currently employed Corrie Calix has difficulty with  ADLs and IADLs as listed previously, which  affecting his/her daily functional abilities.      Comorbidities:    has a past medical history of Allergy, Anti-polysaccharide antibody deficiency, Asthma, Pleurisy, and Pneumothorax.    Medical and Therapy History Review:   Expanded               Performance Deficits    Physical:  Joint Mobility  Muscle Power/Strength  Muscle Endurance  Edema   Strength  Pinch Strength  Pain    Cognitive:  No Deficits    Psychosocial:    No Deficits     Clinical Decision Making:  moderate    Assessment Process:  Problem-Focused Assessments    Modification/Need for Assistance:  Not Necessary    Intervention  Selection:  Limited Treatment Options       moderate  Based on PMHX, co morbidities , data from assessments and functional level of assistance required with task and clinical presentation directly impacting function.         Goals:    LTG's (10 weeks):  1)   Increase  strength 10 lbs. to grasp pot handle.  2)   Increase lat pinch 4 psis for opening water bottles.  3)   Decrease complaints of pain to  4 out of 10 at worst to increase functional hand use for ADL/work/leisure activities.  4)   Pt will return to near to prior level of function for ADLs and household management reporting I or Mod I with ADLs (dressing, feeding, grooming, toileting).     STG's (5 weeks)  1)   Patient to be IND with HEP and modalities for pain/edema managment.  3)   Increase  strength 5 lbs. to improve functional grasp for ADLs/work/leisure activities.   4)   Increase lat pinch 2 psi's to increase independence with button and FM Coordination.   5)   Patient to be IND with Orthotic use, wear and care precautions.   6)   Decrease complaints of pain to  6 out of 10 at worst to increase functional hand use for ADL/work/leisure activities.          Plan     Pt to be treated by Occupational Therapy 1 times per week for 10 weeks during the certification period from 7/11/2022 to 10/11/2022 to achieve the established goals.     Treatment to include: Paraffin, Fluidotherapy, Manual therapy/joint mobilizations, Therapeutic exercises/activities., Strengthening, Orthotic Fabrication/Fit/Training and Joint Protection, as well as any other treatments deemed necessary based on the patient's needs or progress.     GARRETT Freeman  OTR/L, CHT  Occupational therapist, Certified Hand Therapist

## 2022-07-11 NOTE — TELEPHONE ENCOUNTER
Pt cancelled appt through portal because day did not work for her she tried to reschedule thorough the jenna, however she did not see a sooner date. I was able to get patient to be seen sooner than August for results.

## 2022-07-12 ENCOUNTER — HOSPITAL ENCOUNTER (OUTPATIENT)
Dept: RADIOLOGY | Facility: HOSPITAL | Age: 57
Discharge: HOME | End: 2022-07-12
Attending: INTERNAL MEDICINE
Payer: COMMERCIAL

## 2022-07-12 ENCOUNTER — OFFICE VISIT (OUTPATIENT)
Dept: RHEUMATOLOGY | Facility: CLINIC | Age: 57
End: 2022-07-12
Payer: COMMERCIAL

## 2022-07-12 VITALS
DIASTOLIC BLOOD PRESSURE: 73 MMHG | SYSTOLIC BLOOD PRESSURE: 118 MMHG | BODY MASS INDEX: 17.02 KG/M2 | HEART RATE: 69 BPM | WEIGHT: 108.69 LBS

## 2022-07-12 DIAGNOSIS — M54.10 RADICULOPATHY, UNSPECIFIED SPINAL REGION: ICD-10-CM

## 2022-07-12 DIAGNOSIS — G56.03 BILATERAL CARPAL TUNNEL SYNDROME: ICD-10-CM

## 2022-07-12 DIAGNOSIS — M79.672 LEFT FOOT PAIN: ICD-10-CM

## 2022-07-12 DIAGNOSIS — M75.51 ACUTE BURSITIS OF RIGHT SHOULDER: ICD-10-CM

## 2022-07-12 DIAGNOSIS — M79.672 LEFT FOOT PAIN: Primary | ICD-10-CM

## 2022-07-12 PROCEDURE — 99999 PR PBB SHADOW E&M-EST. PATIENT-LVL III: ICD-10-PCS | Mod: PBBFAC,,, | Performed by: INTERNAL MEDICINE

## 2022-07-12 PROCEDURE — 1159F PR MEDICATION LIST DOCUMENTED IN MEDICAL RECORD: ICD-10-PCS | Mod: CPTII,S$GLB,, | Performed by: INTERNAL MEDICINE

## 2022-07-12 PROCEDURE — 3078F DIAST BP <80 MM HG: CPT | Mod: CPTII,S$GLB,, | Performed by: INTERNAL MEDICINE

## 2022-07-12 PROCEDURE — 1160F PR REVIEW ALL MEDS BY PRESCRIBER/CLIN PHARMACIST DOCUMENTED: ICD-10-PCS | Mod: CPTII,S$GLB,, | Performed by: INTERNAL MEDICINE

## 2022-07-12 PROCEDURE — 3078F PR MOST RECENT DIASTOLIC BLOOD PRESSURE < 80 MM HG: ICD-10-PCS | Mod: CPTII,S$GLB,, | Performed by: INTERNAL MEDICINE

## 2022-07-12 PROCEDURE — 73630 X-RAY EXAM OF FOOT: CPT | Mod: TC,LT

## 2022-07-12 PROCEDURE — 73630 XR FOOT COMPLETE 3 VIEW LEFT: ICD-10-PCS | Mod: 26,LT,, | Performed by: RADIOLOGY

## 2022-07-12 PROCEDURE — 3044F PR MOST RECENT HEMOGLOBIN A1C LEVEL <7.0%: ICD-10-PCS | Mod: CPTII,S$GLB,, | Performed by: INTERNAL MEDICINE

## 2022-07-12 PROCEDURE — 3008F PR BODY MASS INDEX (BMI) DOCUMENTED: ICD-10-PCS | Mod: CPTII,S$GLB,, | Performed by: INTERNAL MEDICINE

## 2022-07-12 PROCEDURE — 3044F HG A1C LEVEL LT 7.0%: CPT | Mod: CPTII,S$GLB,, | Performed by: INTERNAL MEDICINE

## 2022-07-12 PROCEDURE — 3008F BODY MASS INDEX DOCD: CPT | Mod: CPTII,S$GLB,, | Performed by: INTERNAL MEDICINE

## 2022-07-12 PROCEDURE — 99213 OFFICE O/P EST LOW 20 MIN: CPT | Mod: S$GLB,,, | Performed by: INTERNAL MEDICINE

## 2022-07-12 PROCEDURE — 73630 X-RAY EXAM OF FOOT: CPT | Mod: 26,LT,, | Performed by: RADIOLOGY

## 2022-07-12 PROCEDURE — 1159F MED LIST DOCD IN RCRD: CPT | Mod: CPTII,S$GLB,, | Performed by: INTERNAL MEDICINE

## 2022-07-12 PROCEDURE — 99999 PR PBB SHADOW E&M-EST. PATIENT-LVL III: CPT | Mod: PBBFAC,,, | Performed by: INTERNAL MEDICINE

## 2022-07-12 PROCEDURE — 1160F RVW MEDS BY RX/DR IN RCRD: CPT | Mod: CPTII,S$GLB,, | Performed by: INTERNAL MEDICINE

## 2022-07-12 PROCEDURE — 99213 PR OFFICE/OUTPT VISIT, EST, LEVL III, 20-29 MIN: ICD-10-PCS | Mod: S$GLB,,, | Performed by: INTERNAL MEDICINE

## 2022-07-12 PROCEDURE — 3074F PR MOST RECENT SYSTOLIC BLOOD PRESSURE < 130 MM HG: ICD-10-PCS | Mod: CPTII,S$GLB,, | Performed by: INTERNAL MEDICINE

## 2022-07-12 PROCEDURE — 3074F SYST BP LT 130 MM HG: CPT | Mod: CPTII,S$GLB,, | Performed by: INTERNAL MEDICINE

## 2022-07-12 NOTE — PROGRESS NOTES
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  She also has a history of asthma and is on Singulair and Dupixent.  She has chronic bronchiectasis.  She has been on inhaled antibiotics for a Pseudomonas infection for 2 years.  She has a long history of neck stiffness.    I saw her for the 1st time 1 month ago.  In March she developed the sudden onset of aching and weakness in her legs.  She had trouble running.  It then spread to the hands.  She calls it a burning sensation.  There has been some numbness in her hands and feet.  The problem is worse in the morning.  She has difficulty using her hands and difficulty walking.  She has had some swelling in her feet but not in other joints.  She has had no similar problem in the past.  She had no antecedent URI nor has she received a recent vaccine.  She has no nocturnal pain.    Physical examination revealed evidence of osteoarthritis.  She had no definite neurologic problem.  I ordered laboratory studies.  She was already scheduled for a nerve conduction study.  She is scheduled to see Neurology later in the week.    I had put her on gabapentin for her symptoms.  She is only taking it at night.  It has been helping.  She has been sleeping better.  Her NCV shows carpal tunnel syndrome.  It also shows a chronic radiculopathy.  It had no evidence of peripheral neuropathy.  She also developed tendinitis in her right thumb.  She was seen by Hand surgery.  She had an injection, which helped.  She is now using a brace on her hand.    She has had some chronic pain in the right shoulder.  She has difficulty moving the shoulder.  Three days ago she developed swelling in her left 5th toe.  There is no erythema or increased warmth accompanying the swelling.  She has had no similar problem in the past.    Physical examination:  Musculoskeletal:  Cervical spine has good range of motion without pain on range of  motion.  She has slight decreased abduction of the right shoulder.  She has normal internal and external rotation.  She has tenderness of the subacromial bursa.  Left shoulder is unremarkable.  Elbows and wrists are unremarkable.  I did not take her hand out of the brace.  Lumbar spine has good range of motion without pain on range of motion.  Hips, knees, ankles are unremarkable.  She has soft tissue swelling of the bursa of the left 5th toe.  There is no erythema or increased warmth.  The swelling does not extend into the joint.  It is slightly tender.  She has no pain on range of motion of the toe.  Laboratory:  Laboratory studies were normal including urinalysis, CK, aldolase, Anca, ACE.    Assessment:  1. She has carpal tunnel syndrome  2. She appears to have early osteoarthritis  3. Her shoulder problem is subacromial bursitis  4. She has a bunionette of the left 5th toe  5. She has paresthesias but no evidence of peripheral neuropathy    Plans:  1. Use heat to the involved areas  2. I have ordered inflammatory markers and x-ray  3. Consider physical therapy for her shoulder in the future  4. Continue gabapentin Naprosyn as before  5. Return in 4 months

## 2022-07-13 ENCOUNTER — TELEPHONE (OUTPATIENT)
Dept: NEUROLOGY | Facility: CLINIC | Age: 57
End: 2022-07-13
Payer: COMMERCIAL

## 2022-07-13 ENCOUNTER — PATIENT MESSAGE (OUTPATIENT)
Dept: NEUROLOGY | Facility: CLINIC | Age: 57
End: 2022-07-13
Payer: COMMERCIAL

## 2022-07-13 NOTE — TELEPHONE ENCOUNTER
Left messages for the patient to let her know her appointment for tomorrow was canceled due to the provider not being in clinic. The patient was asked to call back to reschedule.

## 2022-07-14 ENCOUNTER — TELEPHONE (OUTPATIENT)
Dept: NEUROLOGY | Facility: CLINIC | Age: 57
End: 2022-07-14
Payer: COMMERCIAL

## 2022-07-14 NOTE — TELEPHONE ENCOUNTER
----- Message from Lacey Escobar sent at 7/14/2022  9:58 AM CDT -----  Regarding: Reschedule appointment  Contact: [phone]  Calling to reschedule appointment due to book out. Please call to schedule.

## 2022-07-15 ENCOUNTER — OFFICE VISIT (OUTPATIENT)
Dept: NEUROLOGY | Facility: CLINIC | Age: 57
End: 2022-07-15
Payer: COMMERCIAL

## 2022-07-15 ENCOUNTER — LAB VISIT (OUTPATIENT)
Dept: LAB | Facility: HOSPITAL | Age: 57
End: 2022-07-15
Payer: COMMERCIAL

## 2022-07-15 VITALS
WEIGHT: 108.69 LBS | DIASTOLIC BLOOD PRESSURE: 78 MMHG | HEIGHT: 67 IN | BODY MASS INDEX: 17.06 KG/M2 | HEART RATE: 86 BPM | SYSTOLIC BLOOD PRESSURE: 111 MMHG

## 2022-07-15 DIAGNOSIS — G56.03 BILATERAL CARPAL TUNNEL SYNDROME: ICD-10-CM

## 2022-07-15 DIAGNOSIS — M25.649 STIFFNESS OF HAND JOINT, UNSPECIFIED LATERALITY: ICD-10-CM

## 2022-07-15 DIAGNOSIS — M54.12 CERVICAL RADICULOPATHY: ICD-10-CM

## 2022-07-15 DIAGNOSIS — F41.9 ANXIETY: ICD-10-CM

## 2022-07-15 DIAGNOSIS — G62.9 NEUROPATHY: ICD-10-CM

## 2022-07-15 DIAGNOSIS — G56.03 BILATERAL CARPAL TUNNEL SYNDROME: Primary | ICD-10-CM

## 2022-07-15 DIAGNOSIS — M54.12 RADICULOPATHY OF CERVICAL SPINE: ICD-10-CM

## 2022-07-15 LAB
ALBUMIN SERPL BCP-MCNC: 4 G/DL (ref 3.5–5.2)
ALP SERPL-CCNC: 78 U/L (ref 55–135)
ALT SERPL W/O P-5'-P-CCNC: 18 U/L (ref 10–44)
ANION GAP SERPL CALC-SCNC: 9 MMOL/L (ref 8–16)
AST SERPL-CCNC: 28 U/L (ref 10–40)
BILIRUB SERPL-MCNC: 1.2 MG/DL (ref 0.1–1)
BUN SERPL-MCNC: 21 MG/DL (ref 6–20)
CALCIUM SERPL-MCNC: 9.2 MG/DL (ref 8.7–10.5)
CHLORIDE SERPL-SCNC: 105 MMOL/L (ref 95–110)
CO2 SERPL-SCNC: 27 MMOL/L (ref 23–29)
CREAT SERPL-MCNC: 0.7 MG/DL (ref 0.5–1.4)
EST. GFR  (AFRICAN AMERICAN): >60 ML/MIN/1.73 M^2
EST. GFR  (NON AFRICAN AMERICAN): >60 ML/MIN/1.73 M^2
GLUCOSE SERPL-MCNC: 93 MG/DL (ref 70–110)
POTASSIUM SERPL-SCNC: 4 MMOL/L (ref 3.5–5.1)
PROT SERPL-MCNC: 7.5 G/DL (ref 6–8.4)
SODIUM SERPL-SCNC: 141 MMOL/L (ref 136–145)

## 2022-07-15 PROCEDURE — 99999 PR PBB SHADOW E&M-EST. PATIENT-LVL IV: CPT | Mod: PBBFAC,,, | Performed by: PHYSICIAN ASSISTANT

## 2022-07-15 PROCEDURE — 3044F PR MOST RECENT HEMOGLOBIN A1C LEVEL <7.0%: ICD-10-PCS | Mod: CPTII,S$GLB,, | Performed by: PHYSICIAN ASSISTANT

## 2022-07-15 PROCEDURE — 1159F MED LIST DOCD IN RCRD: CPT | Mod: CPTII,S$GLB,, | Performed by: PHYSICIAN ASSISTANT

## 2022-07-15 PROCEDURE — 80053 COMPREHEN METABOLIC PANEL: CPT | Performed by: PHYSICIAN ASSISTANT

## 2022-07-15 PROCEDURE — 3074F SYST BP LT 130 MM HG: CPT | Mod: CPTII,S$GLB,, | Performed by: PHYSICIAN ASSISTANT

## 2022-07-15 PROCEDURE — 3074F PR MOST RECENT SYSTOLIC BLOOD PRESSURE < 130 MM HG: ICD-10-PCS | Mod: CPTII,S$GLB,, | Performed by: PHYSICIAN ASSISTANT

## 2022-07-15 PROCEDURE — 86235 NUCLEAR ANTIGEN ANTIBODY: CPT | Mod: 59 | Performed by: PHYSICIAN ASSISTANT

## 2022-07-15 PROCEDURE — 99214 OFFICE O/P EST MOD 30 MIN: CPT | Mod: S$GLB,,, | Performed by: PHYSICIAN ASSISTANT

## 2022-07-15 PROCEDURE — 3078F PR MOST RECENT DIASTOLIC BLOOD PRESSURE < 80 MM HG: ICD-10-PCS | Mod: CPTII,S$GLB,, | Performed by: PHYSICIAN ASSISTANT

## 2022-07-15 PROCEDURE — 3008F PR BODY MASS INDEX (BMI) DOCUMENTED: ICD-10-PCS | Mod: CPTII,S$GLB,, | Performed by: PHYSICIAN ASSISTANT

## 2022-07-15 PROCEDURE — 1160F RVW MEDS BY RX/DR IN RCRD: CPT | Mod: CPTII,S$GLB,, | Performed by: PHYSICIAN ASSISTANT

## 2022-07-15 PROCEDURE — 3078F DIAST BP <80 MM HG: CPT | Mod: CPTII,S$GLB,, | Performed by: PHYSICIAN ASSISTANT

## 2022-07-15 PROCEDURE — 99214 PR OFFICE/OUTPT VISIT, EST, LEVL IV, 30-39 MIN: ICD-10-PCS | Mod: S$GLB,,, | Performed by: PHYSICIAN ASSISTANT

## 2022-07-15 PROCEDURE — 3044F HG A1C LEVEL LT 7.0%: CPT | Mod: CPTII,S$GLB,, | Performed by: PHYSICIAN ASSISTANT

## 2022-07-15 PROCEDURE — 3008F BODY MASS INDEX DOCD: CPT | Mod: CPTII,S$GLB,, | Performed by: PHYSICIAN ASSISTANT

## 2022-07-15 PROCEDURE — 1159F PR MEDICATION LIST DOCUMENTED IN MEDICAL RECORD: ICD-10-PCS | Mod: CPTII,S$GLB,, | Performed by: PHYSICIAN ASSISTANT

## 2022-07-15 PROCEDURE — 99999 PR PBB SHADOW E&M-EST. PATIENT-LVL IV: ICD-10-PCS | Mod: PBBFAC,,, | Performed by: PHYSICIAN ASSISTANT

## 2022-07-15 PROCEDURE — 1160F PR REVIEW ALL MEDS BY PRESCRIBER/CLIN PHARMACIST DOCUMENTED: ICD-10-PCS | Mod: CPTII,S$GLB,, | Performed by: PHYSICIAN ASSISTANT

## 2022-07-15 RX ORDER — DIAZEPAM 5 MG/1
5 TABLET ORAL EVERY 6 HOURS PRN
Qty: 2 TABLET | Refills: 0 | Status: SHIPPED | OUTPATIENT
Start: 2022-07-15 | End: 2022-08-14

## 2022-07-15 RX ORDER — NAPROXEN 500 MG/1
500 TABLET ORAL 2 TIMES DAILY WITH MEALS
Qty: 28 TABLET | Refills: 0 | Status: SHIPPED | OUTPATIENT
Start: 2022-07-15

## 2022-07-15 NOTE — PROGRESS NOTES
[facility] - NEUROLOGY 7TH [place] [facility], [place]    Date: 7/15/22  Patient Name: Corrie Calix   MRN: [id number]   PCP: Primary Doctor No  Referring Provider: No ref. provider found    Chief Complaint: Neuropathy  Subjective:   Corrie Calix is a 57 y.o. female presenting to clinic today for the evaluation of neuropathy.         Interval History 07/15/2022-   I have reviewed all relevant medical records in Epic. Patient presents for follow up regarding neuropathy of the bilateral upper limbs and lower limbs. She has received an EMG since her previous visit which showed bilateral carpal tunnel and evidence of radiculopathy at C7-C8. Discussed with the patient the findings and that a MRI of the cervical spine would be beneficial to evaluate for any abnormality contributing to radiculopathy. Her latest CPK was wnl (116). She states she now wears braces on her bilateral wrists and takes gabapentin and is able to sleep much better and her pain is much more controlled. She is also set up to see ortho for her bilateral carpal tunnel. She states she has been taking it easy due to bursitis.     HPI:   Ms. Corrie Calix is a 57 y.o. female with asthma, endometriosis, and bronchiectasis presenting for evaluation of neuropathy. On chart review patient was evaluated by ID and found to have progressive neuropathy beginning in the lower extremities and now progressing to the upper extremities. Patient states that she had some changes in her medications (around 03/06/2022) for her asthma and bronchiectasis and following this she experienced stiffness/pain in both legs; she does state she ran 2.5 miles that day which she normally does not do. She then began experiencing pain and tingling of both of her hands. She reports that in the morning she has more difficulty with strength in both of her hands as well as her legs. She states that she use to be very good at crouching and bending down and  now she requires help to stand. She finds in the morning she has difficulty with coordination of her legs and it is unclear if she is weak or if she has pain. The movement irritates the pain and she denies any radiation of the pain down her arm or down her leg. She states she is experiencing some difficulty with her hands making a fist and getting nishant/buttons closed in the morning. She rates her discomfort/pain as 4/10 and relates the sensation to more of a soreness feeling. She states that her hands wake her up frequently at night and cause her pain. She denies any problems swallowing or speaking.         PAST MEDICAL HISTORY:  Past Medical History:   Diagnosis Date    Allergy     Anti-polysaccharide antibody deficiency     Asthma     Pleurisy     Pneumothorax        PAST SURGICAL HISTORY:  Past Surgical History:   Procedure Laterality Date    BRONCHOSCOPY N/A 6/29/2021    Procedure: BRONCHOSCOPY;  Surgeon: Madelyn Diagnostic Provider;  Location: [facility] OR [address] [place] [address];  Service: Anesthesiology;  Laterality: N/A;    EYE SURGERY      LUNG SURGERY         CURRENT MEDS:  Current Outpatient Medications   Medication Sig Dispense Refill    albuterol (PROVENTIL) 2.5 mg /3 mL (0.083 %) nebulizer solution FOR USE WITH NEBULIZER 3 TIMES A DAY AS NEEDED      cetirizine (ZYRTEC) 10 MG tablet Take 10 mg by mouth once daily.      COMP-AIR NEBULIZER COMPRESSOR Yoselyn use as directed      dupilumab (DUPIXENT PEN) 300 mg/2 mL PnIj Inject 300 mg into the skin every 14 (fourteen) days.      EPINEPHrine (EPIPEN JR) 0.15 mg/0.3 mL pen injection Inject 0.15 mg into the muscle.      gabapentin (NEURONTIN) 100 MG capsule Take 1 capsule (100 mg total) by mouth 3 (three) times daily. 90 capsule 11    immun glob G,IgG,-gly-IgA ov50 (CUVITRU) 8 gram/40 mL (20 %) Soln as directed      montelukast (SINGULAIR) 10 mg tablet Take 10 mg by mouth nightly.      sodium chloride 3% 3 % nebulizer solution Take 4 mLs by nebulization 2 (two)  "times a day. 240 mL 11    tobramycin, PF, (PIETER) 300 mg/5 mL nebulizer solution Take 5 mLs (300 mg total) by nebulization every 12 (twelve) hours. Take medication for 14 days on, followed by 14 days off 140 mL 11    diazePAM (VALIUM) 5 MG tablet Take 1 tablet (5 mg total) by mouth every 6 (six) hours as needed for Anxiety (For anxiety regarding MRI take 1 hour prior to imaging). 2 tablet 0    naproxen (NAPROSYN) 500 MG tablet Take 1 tablet (500 mg total) by mouth 2 (two) times daily with meals. 28 tablet 0     No current facility-administered medications for this visit.       ALLERGIES:  Review of patient's allergies indicates:   Allergen Reactions    Jethro-dur Other (See Comments)    Theophylline Nausea And Vomiting and Other (See Comments)       FAMILY HISTORY:  Family History   Problem Relation Age of Onset    Cancer Mother     No Known Problems Father        SOCIAL HISTORY:  Social History     Tobacco Use    Smoking status: Former Smoker    Smokeless tobacco: Never Used   Substance Use Topics    Alcohol use: Never    Drug use: Never       Review of Systems:  Review of Systems   Constitutional: Negative for chills, fever and weight loss.   HENT: Negative for ear discharge, ear pain, hearing loss, nosebleeds and tinnitus.    Eyes: Negative for blurred vision, double vision and photophobia.   Respiratory: Positive for cough, shortness of breath and wheezing. Negative for sputum production.    Cardiovascular: Negative for chest pain, palpitations and orthopnea.   Gastrointestinal: Negative for abdominal pain, diarrhea, heartburn, nausea and vomiting.   Genitourinary: Negative for dysuria, frequency and urgency.   Neurological: Positive for tingling and focal weakness. Negative for tremors, sensory change, seizures and loss of consciousness.            Objective:     Vitals:    07/15/22 1507   BP: 111/78   Pulse: 86   Weight: 49.3 kg (108 lb 11 oz)   Height: 5' 7" (1.702 m)         NEUROLOGICAL EXAMINATION: "     MENTAL STATUS   Oriented to person, place, and time.   Level of consciousness: alert    CRANIAL NERVES     CN III, IV, VI   Pupils are equal, round, and reactive to light.  Extraocular motions are normal.     CN V   Facial sensation intact.     CN VII   Facial expression full, symmetric.     CN VIII   CN VIII normal.     CN IX, X   CN IX normal.   CN X normal.     CN XII   CN XII normal.     MOTOR EXAM   Muscle bulk: normal (patient is slight but not overly so)    Strength   Right neck flexion: 4/5  Left neck flexion: 4/5  Right neck extension: 4/5  Left neck extension: 4/5  Right deltoid: 4/5  Left deltoid: 4/5  Right biceps: 4/5  Left biceps: 4/5  Right triceps: 4/5  Left triceps: 4/5  Right wrist flexion: 4/5  Left wrist flexion: 4/5  Right wrist extension: 4/5  Left wrist extension: 4/5  Right interossei: 4/5  Left interossei: 4/5  Right abdominals: 4/5  Left abdominals: 4/5  Right iliopsoas: 4/5  Left iliopsoas: 4/5  Right quadriceps: 4/5  Left quadriceps: 4/5  Right hamstrin/5  Left hamstrin/5  Right glutei: 4/5  Left glutei: 4/5  Right anterior tibial: 4/5  Left anterior tibial: 4/5  Right posterior tibial: 4/5  Left posterior tibial: 4/5  Right peroneal: 4/5  Left peroneal: 4/5  Right gastroc: 4/5  Left gastroc: 4/5    REFLEXES     Reflexes   Right brachioradialis: 2+  Left brachioradialis: 2+  Right biceps: 2+  Left biceps: 2+  Right triceps: 2+  Left triceps: 2+  Right patellar: 2+  Left patellar: 2+  Right achilles: 2+  Left achilles: 2+  Right : 2+  Left : 2+  Right plantar: normal  Left plantar: normal  Right Davis: absent  Left Davis: absent  Right ankle clonus: absent  Left ankle clonus: absent    SENSORY EXAM   Light touch normal.   Vibration normal.   Proprioception normal.   Pinprick normal.     GAIT AND COORDINATION     Gait  Gait: normal     Coordination   Finger to nose coordination: normal  Heel to shin coordination: normal  Tandem walking coordination: normal    Tremor    Resting tremor: absent  Intention tremor: absent  Action tremor: absent    ? ?    EMG   07/07/2022-   Severe bilateral carpal tunnel syndrome (median neuropathy of the wrist) with secondary denervation in both APB muscles  Chronic denervation in the right C7-8 myotomes suggestive of radiculopathy at those levels    Assessment:   Corrie Calix is a 57 y.o. female presenting for evaluation of neuropathy.    Plan:   Discussed with the patient that an MRI of the cervical spine would be beneficial to evaluate if there is any damage that caused the radiculopathy found at C7-C8. Patient given valium for anxiety regarding imaging. Discussed all risks and benefits with the patient. She is doing occupational therapy for her bilateral carpal tunnel and states it has been beneficial.     Patient was referred to Orthopedics for evaluation of her bilateral carpal tunnel       RTC pending MRI of cervical spine    Problem List Items Addressed This Visit    None     Visit Diagnoses     Bilateral carpal tunnel syndrome    -  Primary    Relevant Medications    naproxen (NAPROSYN) 500 MG tablet    Other Relevant Orders    Comprehensive metabolic panel    Neuropathy        Relevant Orders    MyoMarker Panel 3    MRI Cervical Spine W WO Cont    Comprehensive metabolic panel    Radiculopathy of cervical spine        Cervical radiculopathy        Relevant Orders    MRI Cervical Spine W WO Cont    Anxiety        Relevant Medications    diazePAM (VALIUM) 5 MG tablet    Stiffness of hand joint, unspecified laterality        Relevant Medications    naproxen (NAPROSYN) 500 MG tablet          I spent a total of 30 minutes on the day of the visit. This includes face to face time and non-face to face time preparing to see the patient (eg, review of tests), obtaining and/or reviewing separately obtained history, documenting clinical information in the electronic or other health record, independently interpreting results and communicating  results to the patient/family/caregiver, or care coordinator.    Guerita Mosley PA-C  Supervising physician Kentrell Crowe MD was avalible for all questions during this exam.  [facility]

## 2022-07-29 LAB
ANTI-PM/SCL AB: <20 UNITS
ANTI-SS-A 52 KD AB, IGG: <20 UNITS
EJ AB SER QL: NEGATIVE
ENA JO1 AB SER IA-ACNC: <20 UNITS
ENA SM+RNP AB SER IA-ACNC: <20 UNITS
FIBRILLARIN (U3 RNP): NEGATIVE
KU AB SER QL: NEGATIVE
MDA-5 (P140): <20 UNITS
MI2 AB SER QL: NEGATIVE
NXP-2 (P140): <20 UNITS
OJ AB SER QL: NEGATIVE
PL12 AB SER QL: NEGATIVE
PL7 AB SER QL: NEGATIVE
SRP AB SERPL QL: NEGATIVE
TIF1 GAMMA (P155/140): <20 UNITS
U2 SNRNP: NEGATIVE

## 2022-08-03 RX ORDER — DEXAMETHASONE SODIUM PHOSPHATE 4 MG/ML
4 INJECTION, SOLUTION INTRA-ARTICULAR; INTRALESIONAL; INTRAMUSCULAR; INTRAVENOUS; SOFT TISSUE
Status: DISCONTINUED | OUTPATIENT
Start: 2022-07-05 | End: 2022-08-03 | Stop reason: HOSPADM

## 2022-08-03 NOTE — PROGRESS NOTES
Occupational Therapy Daily Treatment Note     Date: 8/4/2022  Name: Corrie Calix  Clinic Number: [id number]    Therapy Diagnosis:   Encounter Diagnosis   Name Primary?    Pain in both hands Yes     Physician: Elvis Cody MD    Medical Diagnosis: M25.649 (ICD-10-CM) - Stiffness of hand joint, unspecified laterality  Physician Orders: Eval/tx  Evaluation Date: 7/11/2022  Plan of Care Certification Date: 10/12/2022  Authorization Period: 12/31/2022  Surgery Date and Procedure: N/A  Date of Return to MD: JHONNY     Visit #: 1 of 20  Time In: 3:30 PM  Time Out: 4:15 PM  Total Billable Time: 45 min     Precautions: Standard,    Subjective     Pt reports: Improved symptoms in the fingers, however her entire body is still hurting  Response to previous treatment:Jimmie well    Pain: 3/10  Location: Bilateral PIP's    Objective     Corrie participated in dynamic functional therapeutic activities to improve functional performance for 40  minutes, including:  -Extensive pathophysiology and joint protection/education training  -Yellow putty gripping 10, 2P/lat pinching    Home Exercises and Education Provided     Education provided:   - Updated HEP  - Progress towards goals     Written Home Exercises Provided: yes.  Exercises were reviewed and Corrie was able to demonstrate them prior to the end of the session.  Corrie demonstrated good  understanding of the HEP provided.   .   See EMR under Media for exercises provided 8/4/2022.        Assessment     Pt would continue to benefit from skilled OT to maximize BUE function.     Corrie is progressing towards her goals and there are no updates to goals at this time. Pt prognosis is Guarded.     Pt will continue to benefit from skilled outpatient occupational therapy to address the deficits listed in the problem list on initial evaluation provide pt/family education and to maximize pt's level of independence in the home and community environment.     Anticipated barriers to  therapy: Unknown etiology of symptoms, multiple body areas involved      Pt's spiritual, cultural and educational needs considered and pt agreeable to plan of care and goals.    Goals:  LTG's (10 weeks):  1)   Increase  strength 10 lbs. to grasp pot handle.  2)   Increase lat pinch 4 psis for opening water bottles.  3)   Decrease complaints of pain to  4 out of 10 at worst to increase functional hand use for ADL/work/leisure activities.  4)   Pt will return to near to prior level of function for ADLs and household management reporting I or Mod I with ADLs (dressing, feeding, grooming, toileting).      STG's (5 weeks)  1)   Patient to be IND with HEP and modalities for pain/edema managment.  3)   Increase  strength 5 lbs. to improve functional grasp for ADLs/work/leisure activities.   4)   Increase lat pinch 2 psi's to increase independence with button and FM Coordination.   5)   Patient to be IND with Orthotic use, wear and care precautions.   6)   Decrease complaints of pain to  6 out of 10 at worst to increase functional hand use for ADL/work/leisure activities.          Plan   Cont OT to address above goals.        GARRETT Freeman OTR/L, CHT

## 2022-08-04 ENCOUNTER — CLINICAL SUPPORT (OUTPATIENT)
Dept: REHABILITATION | Facility: HOSPITAL | Age: 57
End: 2022-08-04
Attending: INTERNAL MEDICINE
Payer: COMMERCIAL

## 2022-08-04 DIAGNOSIS — M79.641 PAIN IN BOTH HANDS: Primary | ICD-10-CM

## 2022-08-04 DIAGNOSIS — M79.642 PAIN IN BOTH HANDS: Primary | ICD-10-CM

## 2022-08-04 PROCEDURE — 97530 THERAPEUTIC ACTIVITIES: CPT | Mod: PO

## 2022-08-04 NOTE — PATIENT INSTRUCTIONS
Strengthening (Resistive Putty)        Squeeze putty using thumb and all fingers.  Repeat 10-20 times. Do 1-2 sessions per day.      Palmar Pinch Strengthening (Resistive Putty)        Pinch putty between thumb and index fingertip.  Repeat 2-3 logs. Do 1-2 sessions per day.       Lateral Pinch Strengthening (Resistive Putty)        Squeeze between thumb and side of index finger.  Repeat 2-3 logs. Do 1-2 sessions per day.

## 2022-08-08 ENCOUNTER — HOSPITAL ENCOUNTER (OUTPATIENT)
Dept: RADIOLOGY | Facility: HOSPITAL | Age: 57
Discharge: HOME | End: 2022-08-08
Attending: PHYSICIAN ASSISTANT
Payer: COMMERCIAL

## 2022-08-08 DIAGNOSIS — M54.12 CERVICAL RADICULOPATHY: ICD-10-CM

## 2022-08-08 DIAGNOSIS — G62.9 NEUROPATHY: ICD-10-CM

## 2022-08-08 PROCEDURE — 72141 MRI NECK SPINE W/O DYE: CPT | Mod: 26,,, | Performed by: RADIOLOGY

## 2022-08-08 PROCEDURE — 72141 MRI NECK SPINE W/O DYE: CPT | Mod: TC

## 2022-08-08 PROCEDURE — 72141 MRI CERVICAL SPINE WITHOUT CONTRAST: ICD-10-PCS | Mod: 26,,, | Performed by: RADIOLOGY

## 2022-08-09 NOTE — PROGRESS NOTES
Occupational Therapy Daily Treatment Note     Date: 8/10/2022  Name: Corrie Calix  Clinic Number: [id number]    Therapy Diagnosis:   Encounter Diagnosis   Name Primary?    Pain in both hands Yes     Physician: Elvis Cody MD    Medical Diagnosis: M25.649 (ICD-10-CM) - Stiffness of hand joint, unspecified laterality  Physician Orders: Eval/tx  Evaluation Date: 7/11/2022  Plan of Care Certification Date: 10/12/2022  Authorization Period: 12/31/2022  Surgery Date and Procedure: N/A  Date of Return to MD: JHONNY     Visit #: 2 of 20  Time In: 5:15 PM  Time Out: 6:00 PM  Total Billable Time: 45 min     Precautions: Standard,    Subjective     Pt reports: Improved symptoms in the fingers, however her entire body is still hurting  Response to previous treatment:Jimmie well    Pain: 3/10  Location: Bilateral PIP's    Objective     Corrie received the following supervised modalities after being cleared for contraindications for 10 minutes in order to promote increased blood flow and tissue elasticity:   -Fluidotherapy bilateral hands  -MH bilateral shoulders    Corrie participated in dynamic functional therapeutic activities bilaterally to improve functional performance for 30  minutes, including:  -Isopheres 3'  -Octi 3'  -TGE's 10  -Joint protection training    Home Exercises and Education Provided     Education provided:   - Updated HEP  - Progress towards goals     Written Home Exercises Provided: yes.  Exercises were reviewed and Corrie was able to demonstrate them prior to the end of the session.  Corrie demonstrated good  understanding of the HEP provided.   .   See EMR under Media for exercises provided 8/4/2022.        Assessment     Pt would continue to benefit from skilled OT to maximize BUE function.     Corrie is progressing towards her goals and there are no updates to goals at this time. Pt prognosis is Guarded.     Pt will continue to benefit from skilled outpatient occupational therapy to address the  deficits listed in the problem list on initial evaluation provide pt/family education and to maximize pt's level of independence in the home and community environment.     Anticipated barriers to therapy: Unknown etiology of symptoms, multiple body areas involved      Pt's spiritual, cultural and educational needs considered and pt agreeable to plan of care and goals.    Goals:  LTG's (10 weeks):  1)   Increase  strength 10 lbs. to grasp pot handle.  2)   Increase lat pinch 4 psis for opening water bottles.  3)   Decrease complaints of pain to  4 out of 10 at worst to increase functional hand use for ADL/work/leisure activities.  4)   Pt will return to near to prior level of function for ADLs and household management reporting I or Mod I with ADLs (dressing, feeding, grooming, toileting).      STG's (5 weeks)  1)   Patient to be IND with HEP and modalities for pain/edema managment.  3)   Increase  strength 5 lbs. to improve functional grasp for ADLs/work/leisure activities.   4)   Increase lat pinch 2 psi's to increase independence with button and FM Coordination.   5)   Patient to be IND with Orthotic use, wear and care precautions.   6)   Decrease complaints of pain to  6 out of 10 at worst to increase functional hand use for ADL/work/leisure activities.          Plan   Cont OT to address above goals.        GARRETT Freeman OTR/L, CHT

## 2022-08-10 ENCOUNTER — CLINICAL SUPPORT (OUTPATIENT)
Dept: REHABILITATION | Facility: HOSPITAL | Age: 57
End: 2022-08-10
Attending: INTERNAL MEDICINE
Payer: COMMERCIAL

## 2022-08-10 DIAGNOSIS — M79.641 PAIN IN BOTH HANDS: Primary | ICD-10-CM

## 2022-08-10 DIAGNOSIS — M79.642 PAIN IN BOTH HANDS: Primary | ICD-10-CM

## 2022-08-10 PROCEDURE — 97022 WHIRLPOOL THERAPY: CPT | Mod: PO

## 2022-08-10 PROCEDURE — 97530 THERAPEUTIC ACTIVITIES: CPT | Mod: PO

## 2022-08-15 DIAGNOSIS — J47.9 ADULT BRONCHIECTASIS: ICD-10-CM

## 2022-08-15 RX ORDER — TOBRAMYCIN INHALATION SOLUTION 300 MG/5ML
300 INHALANT RESPIRATORY (INHALATION) EVERY 12 HOURS
Qty: 140 ML | Refills: 11 | Status: SHIPPED | OUTPATIENT
Start: 2022-08-15

## 2022-08-15 NOTE — TELEPHONE ENCOUNTER
Received fax from [facility] informing Tobramycin Neb 300mg / 5mL is no longer available in brand name.    Is generic ok? if so need rx refill sent in.

## 2022-08-18 ENCOUNTER — OFFICE VISIT (OUTPATIENT)
Dept: INTERNAL MEDICINE | Facility: CLINIC | Age: 57
End: 2022-08-18
Payer: COMMERCIAL

## 2022-08-18 VITALS
SYSTOLIC BLOOD PRESSURE: 122 MMHG | DIASTOLIC BLOOD PRESSURE: 64 MMHG | BODY MASS INDEX: 16.95 KG/M2 | WEIGHT: 108 LBS | HEIGHT: 67 IN | OXYGEN SATURATION: 97 % | HEART RATE: 90 BPM

## 2022-08-18 DIAGNOSIS — G89.29 CHRONIC PAIN OF BOTH KNEES: ICD-10-CM

## 2022-08-18 DIAGNOSIS — G89.29 CHRONIC LEFT SHOULDER PAIN: ICD-10-CM

## 2022-08-18 DIAGNOSIS — M25.561 CHRONIC PAIN OF BOTH KNEES: ICD-10-CM

## 2022-08-18 DIAGNOSIS — M25.512 CHRONIC LEFT SHOULDER PAIN: ICD-10-CM

## 2022-08-18 DIAGNOSIS — M25.50 PAIN IN JOINT, MULTIPLE SITES: ICD-10-CM

## 2022-08-18 DIAGNOSIS — M25.562 CHRONIC PAIN OF BOTH KNEES: ICD-10-CM

## 2022-08-18 DIAGNOSIS — G56.03 BILATERAL CARPAL TUNNEL SYNDROME: Primary | ICD-10-CM

## 2022-08-18 DIAGNOSIS — D80.6 SPECIFIC ANTIBODY DEFICIENCY WITH NORMAL IG CONCENTRATION AND NORMAL NUMBER OF B CELLS: ICD-10-CM

## 2022-08-18 PROCEDURE — 3044F PR MOST RECENT HEMOGLOBIN A1C LEVEL <7.0%: ICD-10-PCS | Mod: CPTII,S$GLB,, | Performed by: INTERNAL MEDICINE

## 2022-08-18 PROCEDURE — 99215 PR OFFICE/OUTPT VISIT, EST, LEVL V, 40-54 MIN: ICD-10-PCS | Mod: S$GLB,,, | Performed by: INTERNAL MEDICINE

## 2022-08-18 PROCEDURE — 3078F PR MOST RECENT DIASTOLIC BLOOD PRESSURE < 80 MM HG: ICD-10-PCS | Mod: CPTII,S$GLB,, | Performed by: INTERNAL MEDICINE

## 2022-08-18 PROCEDURE — 3074F SYST BP LT 130 MM HG: CPT | Mod: CPTII,S$GLB,, | Performed by: INTERNAL MEDICINE

## 2022-08-18 PROCEDURE — 1159F MED LIST DOCD IN RCRD: CPT | Mod: CPTII,S$GLB,, | Performed by: INTERNAL MEDICINE

## 2022-08-18 PROCEDURE — 3074F PR MOST RECENT SYSTOLIC BLOOD PRESSURE < 130 MM HG: ICD-10-PCS | Mod: CPTII,S$GLB,, | Performed by: INTERNAL MEDICINE

## 2022-08-18 PROCEDURE — 3078F DIAST BP <80 MM HG: CPT | Mod: CPTII,S$GLB,, | Performed by: INTERNAL MEDICINE

## 2022-08-18 PROCEDURE — 3008F BODY MASS INDEX DOCD: CPT | Mod: CPTII,S$GLB,, | Performed by: INTERNAL MEDICINE

## 2022-08-18 PROCEDURE — 1160F RVW MEDS BY RX/DR IN RCRD: CPT | Mod: CPTII,S$GLB,, | Performed by: INTERNAL MEDICINE

## 2022-08-18 PROCEDURE — 1160F PR REVIEW ALL MEDS BY PRESCRIBER/CLIN PHARMACIST DOCUMENTED: ICD-10-PCS | Mod: CPTII,S$GLB,, | Performed by: INTERNAL MEDICINE

## 2022-08-18 PROCEDURE — 3044F HG A1C LEVEL LT 7.0%: CPT | Mod: CPTII,S$GLB,, | Performed by: INTERNAL MEDICINE

## 2022-08-18 PROCEDURE — 99999 PR PBB SHADOW E&M-EST. PATIENT-LVL IV: ICD-10-PCS | Mod: PBBFAC,,, | Performed by: INTERNAL MEDICINE

## 2022-08-18 PROCEDURE — 1159F PR MEDICATION LIST DOCUMENTED IN MEDICAL RECORD: ICD-10-PCS | Mod: CPTII,S$GLB,, | Performed by: INTERNAL MEDICINE

## 2022-08-18 PROCEDURE — 3008F PR BODY MASS INDEX (BMI) DOCUMENTED: ICD-10-PCS | Mod: CPTII,S$GLB,, | Performed by: INTERNAL MEDICINE

## 2022-08-18 PROCEDURE — 99999 PR PBB SHADOW E&M-EST. PATIENT-LVL IV: CPT | Mod: PBBFAC,,, | Performed by: INTERNAL MEDICINE

## 2022-08-18 PROCEDURE — 99215 OFFICE O/P EST HI 40 MIN: CPT | Mod: S$GLB,,, | Performed by: INTERNAL MEDICINE

## 2022-08-18 NOTE — PROGRESS NOTES
"    CHIEF COMPLAINT     No chief complaint on file.      HPI     Corrie Calix is a 57 y.o. 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  Wrist splints have helped stabilize but still having pain  Her de Quervain tenosynovitis improved with injection  Evaluated by rheumatology and neurology for neuropathic symptoms secondary workup was negative.      Reports having subacute left shoulder pain with unclear etiology exacerbated by lifting arm above her head trouble put Benita back and putting on shirts.    Personally Reviewed Patient's Medical, surgical, family and social hx. Changes updated in [facility].  Care Team updated in Epic    Review of Systems:  Review of Systems   Respiratory: Negative for cough and shortness of breath.    Musculoskeletal: Positive for arthralgias and myalgias.        Left shoulder pain   Neurological: Positive for numbness.       Health Maintenance:   Reviewed with patient  Due for the following:  deferred    PHYSICAL EXAM     /64 (BP Location: Right arm, Patient Position: Sitting, BP Method: Small (Manual))   Pulse 90   Ht 5' 7" (1.702 m)   Wt 49 kg (108 lb)   SpO2 97%   BMI 16.92 kg/m²     Gen: Well Appearing, NAD  HEENT: PERR, EOMI  Neck: FROM, no thyromegaly, no cervical adenopathy  CVD: RRR, no M/R/G  Pulm: Normal work of breathing, CTAB, no wheezing  Abd:  Soft, NT, ND non TTP, no mass  MSK: no LE edema  Left shoulder:  Positive job positive belly press pain with active AB duction less pain with passive AB duction  Neuro: A&Ox3, gait normal, speech normal  Mood; Mood normal, behavior normal, thought process linear       LABS     Labs reviewed;   Lab Results   Component Value Date    WBC 5.08 05/19/2022    WBC 5.08 05/19/2022    HGB 14.4 05/19/2022    HGB 14.4 05/19/2022    HCT 42.8 05/19/2022    HCT 42.8 05/19/2022    MCV 89 05/19/2022    MCV 89 05/19/2022     05/19/2022     " 05/19/2022         Chemistry        Component Value Date/Time     07/15/2022 1602    K 4.0 07/15/2022 1602     07/15/2022 1602    CO2 27 07/15/2022 1602    BUN 21 (H) 07/15/2022 1602    CREATININE 0.7 07/15/2022 1602    GLU 93 07/15/2022 1602        Component Value Date/Time    CALCIUM 9.2 07/15/2022 1602    ALKPHOS 78 07/15/2022 1602    AST 28 07/15/2022 1602    ALT 18 07/15/2022 1602    BILITOT 1.2 (H) 07/15/2022 1602    ESTGFRAFRICA >60.0 07/15/2022 1602    EGFRNONAA >60.0 07/15/2022 1602        Sed rate CRP both normal    ASSESSMENT     1. Bilateral carpal tunnel syndrome     2. Chronic left shoulder pain     3. 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 Chronic pain of both knees     5. Pain in joint, multiple sites  X-ray Knee Ortho Bilateral           Plan     Corrie Calix is a 57 y.o. 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  Curious if tendinopathy is sequelae chronic for quinolone use for treatment of prior pseudomonal infection.  Message sent ID provider    1. Bilateral carpal tunnel syndrome  Confirmed by EMG  Continue splinting has hand surgery follow-up    2. Chronic left shoulder pain  Suspect that this is rotator cuff pathology.  Given [facility] shoulder conditioning program  Will recommend PT      3. 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 Chronic pain of both knees/5. Pain in joint, multiple sites    Will get x-ray- X-ray Knee Ortho Bilateral; Future  >40 minutes of professional services provided as part of today's visit.      Elvis Cody MD

## 2022-08-19 ENCOUNTER — PATIENT MESSAGE (OUTPATIENT)
Dept: ADMINISTRATIVE | Facility: HOSPITAL | Age: 57
End: 2022-08-19
Payer: COMMERCIAL

## 2022-08-25 ENCOUNTER — PATIENT MESSAGE (OUTPATIENT)
Dept: INTERNAL MEDICINE | Facility: CLINIC | Age: 57
End: 2022-08-25
Payer: COMMERCIAL

## 2022-08-29 ENCOUNTER — TELEPHONE (OUTPATIENT)
Dept: ORTHOPEDICS | Facility: CLINIC | Age: 57
End: 2022-08-29
Payer: COMMERCIAL

## 2022-08-30 ENCOUNTER — OFFICE VISIT (OUTPATIENT)
Dept: ORTHOPEDICS | Facility: CLINIC | Age: 57
End: 2022-08-30
Payer: COMMERCIAL

## 2022-08-30 VITALS — HEIGHT: 67 IN | WEIGHT: 108 LBS | BODY MASS INDEX: 16.95 KG/M2

## 2022-08-30 DIAGNOSIS — M75.02 ADHESIVE CAPSULITIS OF LEFT SHOULDER: Primary | ICD-10-CM

## 2022-08-30 PROCEDURE — 3044F PR MOST RECENT HEMOGLOBIN A1C LEVEL <7.0%: ICD-10-PCS | Mod: CPTII,S$GLB,, | Performed by: ORTHOPAEDIC SURGERY

## 2022-08-30 PROCEDURE — 99999 PR PBB SHADOW E&M-EST. PATIENT-LVL III: CPT | Mod: PBBFAC,,, | Performed by: ORTHOPAEDIC SURGERY

## 2022-08-30 PROCEDURE — 99999 PR PBB SHADOW E&M-EST. PATIENT-LVL III: ICD-10-PCS | Mod: PBBFAC,,, | Performed by: ORTHOPAEDIC SURGERY

## 2022-08-30 PROCEDURE — 20610 DRAIN/INJ JOINT/BURSA W/O US: CPT | Mod: LT,S$GLB,, | Performed by: ORTHOPAEDIC SURGERY

## 2022-08-30 PROCEDURE — 1159F PR MEDICATION LIST DOCUMENTED IN MEDICAL RECORD: ICD-10-PCS | Mod: CPTII,S$GLB,, | Performed by: ORTHOPAEDIC SURGERY

## 2022-08-30 PROCEDURE — 3008F PR BODY MASS INDEX (BMI) DOCUMENTED: ICD-10-PCS | Mod: CPTII,S$GLB,, | Performed by: ORTHOPAEDIC SURGERY

## 2022-08-30 PROCEDURE — 1159F MED LIST DOCD IN RCRD: CPT | Mod: CPTII,S$GLB,, | Performed by: ORTHOPAEDIC SURGERY

## 2022-08-30 PROCEDURE — 20610 LARGE JOINT ASPIRATION/INJECTION: L SUBACROMIAL BURSA: ICD-10-PCS | Mod: LT,S$GLB,, | Performed by: ORTHOPAEDIC SURGERY

## 2022-08-30 PROCEDURE — 99214 OFFICE O/P EST MOD 30 MIN: CPT | Mod: 25,S$GLB,, | Performed by: ORTHOPAEDIC SURGERY

## 2022-08-30 PROCEDURE — 3044F HG A1C LEVEL LT 7.0%: CPT | Mod: CPTII,S$GLB,, | Performed by: ORTHOPAEDIC SURGERY

## 2022-08-30 PROCEDURE — 99214 PR OFFICE/OUTPT VISIT, EST, LEVL IV, 30-39 MIN: ICD-10-PCS | Mod: 25,S$GLB,, | Performed by: ORTHOPAEDIC SURGERY

## 2022-08-30 PROCEDURE — 3008F BODY MASS INDEX DOCD: CPT | Mod: CPTII,S$GLB,, | Performed by: ORTHOPAEDIC SURGERY

## 2022-08-30 RX ADMIN — TRIAMCINOLONE ACETONIDE 80 MG: 40 INJECTION, SUSPENSION INTRA-ARTICULAR; INTRAMUSCULAR at 03:08

## 2022-08-30 NOTE — PROGRESS NOTES
[facility]  History & Physical  Orthopedics    SUBJECTIVE:      COVID-19 attestation:  This patient was treated during the COVID-19 pandemic.  This was discussed with the patient, they are aware of our current policies and procedures, were given the option of delaying their visit and or switching to a virtual visit, delaying their surgery when applicable, and they elect to proceed.    Chief Complaint: BL hand numbness, left shoulder pain    Referring Provider: No ref. provider found     History of Present Illness:  Patient is a 57 y.o. right hand dominant female who presents today with complaints of BL hand numbness and left shoulder pain. This has been going on since 3/2022. States she woke up one morning and had inflammation and pain in multiple joints bilateral upper and lower extremities including BL shoulders (L>R), BL hands (mostly MP joints) that is worse in the morning. Has been seeing rheumatology and her PCP for workup and management. EMG of BUE was ordered and showed BL severe carpal tunnel syndrome so she was referred to us for surgical evaluation. She actually saw us last month for right sided dequervains which has now resolved sp CSI. She reports numbness and tingling in BL hands median nerve distribution. A little bit better since wearing BL carpal tunnel braces, starting gabapentin, and doing OT. Both hands bother her to an equal degree. Her biggest complaint right now is actually her left shoulder which has worsening pain and stiffness.     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     The patient denies any fevers, chills, N/V, D/C and presents for evaluation.       Past Medical  History:   Diagnosis Date    Allergy     Anti-polysaccharide antibody deficiency     Asthma     Pleurisy     Pneumothorax      Past Surgical History:   Procedure Laterality Date    BRONCHOSCOPY N/A 6/29/2021    Procedure: BRONCHOSCOPY;  Surgeon: Madelyn Diagnostic Provider;  Location: [facility] OR [address] [place] [address];  Service: Anesthesiology;  Laterality: N/A;    EYE SURGERY      LUNG SURGERY       Review of patient's allergies indicates:   Allergen Reactions    Jethro-dur Other (See Comments)    Theophylline Nausea And Vomiting and Other (See Comments)     Social History     Social History Narrative    Not on file     Family History   Problem Relation Age of Onset    Cancer Mother     No Known Problems Father          Current Outpatient Medications:     albuterol (PROVENTIL) 2.5 mg /3 mL (0.083 %) nebulizer solution, FOR USE WITH NEBULIZER 3 TIMES A DAY AS NEEDED, Disp: , Rfl:     cetirizine (ZYRTEC) 10 MG tablet, Take 10 mg by mouth once daily., Disp: , Rfl:     COMP-AIR NEBULIZER COMPRESSOR Yoselyn, use as directed, Disp: , Rfl:     diazePAM (VALIUM) 5 MG tablet, Take 1 tablet (5 mg total) by mouth every 6 (six) hours as needed for Anxiety (For anxiety regarding MRI take 1 hour prior to imaging)., Disp: 2 tablet, Rfl: 0    dupilumab (DUPIXENT PEN) 300 mg/2 mL PnIj, Inject 300 mg into the skin every 14 (fourteen) days., Disp: , Rfl:     EPINEPHrine (EPIPEN JR) 0.15 mg/0.3 mL pen injection, Inject 0.15 mg into the muscle., Disp: , Rfl:     gabapentin (NEURONTIN) 100 MG capsule, Take 1 capsule (100 mg total) by mouth 3 (three) times daily. (Patient not taking: Reported on 8/18/2022), Disp: 90 capsule, Rfl: 11    immun glob G,IgG,-gly-IgA ov50 (CUVITRU) 8 gram/40 mL (20 %) Soln, 15 gram, Disp: , Rfl:     montelukast (SINGULAIR) 10 mg tablet, Take 10 mg by mouth nightly., Disp: , Rfl:     naproxen (NAPROSYN) 500 MG tablet, Take 1 tablet (500 mg total) by mouth 2 (two) times daily with meals. (Patient not taking: Reported on 8/18/2022), Disp:  "28 tablet, Rfl: 0    sodium chloride 3% 3 % nebulizer solution, Take 4 mLs by nebulization 2 (two) times a day., Disp: 240 mL, Rfl: 11    tobramycin, PF, (PIETER) 300 mg/5 mL nebulizer solution, Take 5 mLs (300 mg total) by nebulization every 12 (twelve) hours. Take medication for 14 days on, followed by 14 days off, Disp: 140 mL, Rfl: 11      Review of Systems:  As per HPI otherwise noncontributory    OBJECTIVE:      Vital Signs (Most Recent):  Vitals:    08/30/22 1558   Weight: 49 kg (108 lb)   Height: 5' 7" (1.702 m)     Body mass index is 16.92 kg/m².      Physical Exam:  Constitutional: The patient appears well-developed and well-nourished. No distress.   Skin: No lesions appreciated  Head: Normocephalic and atraumatic.   Nose: Nose normal.   Ears: No deformities seen  Eyes: Conjunctivae and EOM are normal.   Neck: No tracheal deviation present.   Cardiovascular: Normal rate and intact distal pulses.    Pulmonary/Chest: Effort normal. No respiratory distress.   Abdominal: There is no guarding.   Neurological: The patient is alert.   Psychiatric: The patient has a normal mood and affect.     Bilateral Hand/Wrist Examination:    Observation/Inspection:  Swelling  Multiple PIP joints bilateral hands    Deformity  none  Discoloration  none     Scars   none    Atrophy  none    HAND/WRIST EXAMINATION:  Finkelstein's Test   Neg  WHAT Test    Neg  Snuff box tenderness   Neg  Salgado's Test    Neg  Hook of Hamate Tenderness  Neg  CMC grind    Neg  Circumduction test   Neg    Neurovascular Exam:  Digits WWP, brisk CR < 3s throughout  NVI motor/LTS to R/U nerves, radial pulse 2+  Decreased sensation median nerve distribution BL hands  Tinel's Test - Carpal Tunnel  Negative bilaterally  Tinel's Test - Cubital Tunnel  Neg  Phalen's Test    Neg  Median Nerve Compression Test Positive bilaterally    ROM hand, wrist and elbow full and painless bilaterally    Left shoulder: normal inspection, diffuse TTP, passive ROM- elevation " 170, abduction 160, ER 45, IR to PSIS. Negative drop arm test.   Right shoulder: normal inspection, no TTP, passive ROM- elevation 180, abduction 180, ER 45, IR to L2. Negative drop arm test.      Abdomen not guarded  Respirations nonlabored  Perfusion intact    Diagnostic Results:     Imaging - I independently viewed the patient's imaging as well as the radiology report.  Xrays of the patient's bilateral hands and left shoulder from 6/8/22 reviewed and demonstrate no evidence of any acute fractures or dislocations or significant degenerative changes. The BL hands do appear to have pannus inflammation at multiple PIP joints    EMG - 7/7/22   1. Severe bilateral carpal tunnel syndrome (median neuropathy at the wrist) with secondary denervation in both APB muscles,   2. Chronic denervation in the right C7 and C8 myotomes suggestive of radiculopathy at those levels.     ASSESSMENT/PLAN:      57 y.o. yo female with bilateral severe carpal tunnel syndrome and left shoulder pain most likely due to adhesive capsulitis.       Plan: Discussed with patient treatment options. Recommended surgery for carpal tunnel release both sides. Patient would like to do left side first however she cannot do surgery until at least mid October which is 6 weeks from now due to her job. For this reason I feel comfortable providing a CSI into left shoulder today for suspected frozen shoulder. Will also order PT for left shoulder ROM.       I have explained the risks, benefits, and alternatives of the procedure in detail.  The patient voices understanding and all questions have been answered.  The patient agrees to proceed as planned. After a sterile prep of the skin in the normal the left shoulder is injected from the posterior approach using a 22 gauge needle with a combination of 8cc 1% lidocaine and 80 mg of kenalog. The patient is cautioned and immediate relief of pain is secondary to the local anesthetic and will be temporary.  After the  anesthetic wears off there may be a increase in pain that may last for a few hours or a few days and they should use ice to help alleviate this flair up of pain.         Patient verbalizes understanding and agrees with plan

## 2022-08-31 ENCOUNTER — CLINICAL SUPPORT (OUTPATIENT)
Dept: REHABILITATION | Facility: HOSPITAL | Age: 57
End: 2022-08-31
Attending: INTERNAL MEDICINE
Payer: COMMERCIAL

## 2022-08-31 DIAGNOSIS — G89.29 CHRONIC LEFT SHOULDER PAIN: ICD-10-CM

## 2022-08-31 DIAGNOSIS — Z12.31 OTHER SCREENING MAMMOGRAM: ICD-10-CM

## 2022-08-31 DIAGNOSIS — M25.512 CHRONIC LEFT SHOULDER PAIN: ICD-10-CM

## 2022-08-31 DIAGNOSIS — M75.02 ADHESIVE CAPSULITIS OF LEFT SHOULDER: ICD-10-CM

## 2022-08-31 PROCEDURE — 97110 THERAPEUTIC EXERCISES: CPT | Mod: PO

## 2022-08-31 PROCEDURE — 97166 OT EVAL MOD COMPLEX 45 MIN: CPT | Mod: PO

## 2022-08-31 PROCEDURE — 97010 HOT OR COLD PACKS THERAPY: CPT | Mod: PO

## 2022-08-31 NOTE — PATIENT INSTRUCTIONS
Heat 10-15 minutes prior to exercising if possible    Flexibility: Corner Stretch        Standing in corner with hands just above shoulder level, lean forward until a comfortable stretch is felt across chest. Hold 10 seconds.  Repeat 10 times. Do 3 sessions per day.     ROM: Flexion        Slide right arm up wall, with palm out, by leaning toward wall. Hold 10 seconds.  Repeat 10 times. Do 3 sessions per day.     ROM: Abduction        With left arm resting on table, palm up, bring head down toward arm and simultaneously move trunk away from table. Hold 10 seconds.   Repeat 10 times. Do 3 sessions per day.    ROM: External Rotation        Keeping left forearm palm down on table, bend forward at waist until stretch is felt. Hold 10 seconds.  Repeat 10 times. Do 3 sessions per day.      ROM: Towel Stretch - with Interior Rotation        Pull left arm up behind back by pulling towel up with other arm. Hold 10 seconds.  Repeat 10 times. Do 3 sessions per day.         ROM: Abduction (Standing)        Bring arms straight out from sides and raise as high as possible without pain.  Repeat 10 times per set. Do 1-3 sets per session. Do 3 sessions per day.     ROM: Flexion (Standing)        Bring arms straight out in front and raise as high as possible without pain. Keep palms facing up.  Repeat 10 times per set. Do 1-3 sets per session. Do 3 sessions per day.     ROM: External / Internal Rotation - in Flexion (Standing)        With upper arms straight out in front and parallel to floor, keep elbows bent at right angles and rotate up then down as far as possible without pain.  Repeat 10 times per set. Do 1-3 sets per session. Do 3 sessions per day.

## 2022-08-31 NOTE — TELEPHONE ENCOUNTER
Called back and they stated to call [phone] to start prior authorization. Called number and spoke to sylvester and gave verbal for prior authorization and she stated will have clinician review and let our office know determination.   Patient states she has had white bowel movements since yesterday and has gone 4 times. Please advise no other symptoms.

## 2022-09-01 PROBLEM — G89.29 CHRONIC LEFT SHOULDER PAIN: Status: ACTIVE | Noted: 2022-09-01

## 2022-09-01 PROBLEM — M25.512 CHRONIC LEFT SHOULDER PAIN: Status: ACTIVE | Noted: 2022-09-01

## 2022-09-01 NOTE — PLAN OF CARE
[facility] Occupational Therapy  Initial Evaluation     Date: 8/31/2022  Patient: Corrie Calix  Chart Number: [id number]  Referring Physician: Elvis Cody MD  Therapy Diagnosis:   1. Adhesive capsulitis of left shoulder  Ambulatory referral/consult to Physical/Occupational Therapy      2. Chronic left shoulder pain            Medical Diagnosis: M75.02 (ICD-10-CM) - Adhesive capsulitis of left shoulder  Physician Orders: Left shoulder frozen shoulder. Work on range of motion and strengthening  Evaluation Date: 8/31/2022  Plan of Care Certification Date: 12/1/2022  Authorization Period: 12/31/2022  Surgery Date and Procedure: N/A  Date of Return to MD: JHONNY    Visit #: 1 of 1  Time In: 5:15 PM  Time Out: 6:15 PM  Total Billable Time: 60 min    Precautions: Standard, Standard    Subjective     Involved Side: Left  Dominant Side: Right  Date of Onset: 3/2022  History of Current Condition: Pt w/ onset of multiple inflamed sites throughout her body developed left adhesive capsulitis  Imaging: None  Previous Therapy: None    Patient's Goals for Therapy: Return to premorbid level of function.    Pain:  Functional Pain Scale Rating 0-10:   4/10 on average  2/10 at best  8/10 at worst  Location: Left shoulder  Description: Sharp  Aggravating Factors: Extension, external rotation  Easing Factors: Rest    Previous Level of function Independent w/ ADL's, work    Current Level of Function Difficulty w/ reaching behind her, carrying, lifting    Occupation:  Administrative  Working presently: employed  Duties: Clerical    Past Medical History/Physical Systems Review:   Corrie Calix  has a past medical history of Allergy, Anti-polysaccharide antibody deficiency, Asthma, Pleurisy, and Pneumothorax.    Corrie Calix  has a past surgical history that includes Lung surgery; Eye surgery; and Bronchoscopy (N/A, 6/29/2021).    Corrie has a current medication list which includes the following prescription(s):  albuterol, cetirizine, comp-air nebulizer compressor, diazepam, dupixent pen, epinephrine, gabapentin, cuvitru, montelukast, naproxen, sodium chloride 3%, and tobramycin (pf).    Review of patient's allergies indicates:   Allergen Reactions    Jethro-dur Other (See Comments)    Theophylline Nausea And Vomiting and Other (See Comments)          Objective     Mental status: alert    Observation:   Unremarkable      Sensation:    WNL    Range of Motion:   Left    Shoulder AROM  SF  SE  SA  ER  IR    128 42 85 10 42      Shoulder PROM  SF  SE  SA  ER  IR    138 49 110 17 62      Manual Muscle Testing:     Shoulder   SF  SE  SA  ER  IR    4- 4+ 4+ 3- 4-        Treatment     Treatment Time In: 5:40 PM  Treatment Time Out: 6:15 PM  Total Treatment time separate from Evaluation time: 35 min    Corrie received the following therapeutic modalities for 10 minutes to improve tissue extensibility and pain reduction:  -Moist Heat     Corrie performed therapeutic exercises for 25 minutes including:  -Corner stretches 10  -SF wall stretches 10  -SA table slides 10  -ER table stretches   -IR  towel stretches   -AROM SF/SA/SE/ER/IR 10 ea    Home Exercise Program/Education:  Issued HEP (see patient instructions in EMR) and educated on modality use for pain management . Exercises were reviewed and Corrie was able to demonstrate them prior to the end of the session.   Pt received a written copy of exercises to perform at home. Corrie demonstrated good  understanding of the education provided.  Pt was advised to perform these exercises free of pain, and to stop performing them if pain occurs.    Patient/Family Education: role of OT, goals for OT, scheduling/cancellations - pt verbalized understanding. Discussed insurance limitations with patient.    Additional Education provided: Use of heat      Assessment     Corrie Calix is a 57 y.o. female presents with limitations as described in problem list. Patient can benefit from Occupational  Therapy services for Iontophoresis, ultrasound, moist heat, therapeutic exercises, home exercise program provied with written instructions, ice and strengthening and orthotics, if deemed necessary . The following goals were discussed with the patient and she is in agreement with them as to be addressed in the treatment plan.    The patient's rehab potential is Fair.     Anticipated barriers to occupational therapy: Limited efficacy of therapeutic intervention as it relates to adhesive capsulitis  Pt has no cultural, educational or language barriers to learning provided.    Profile and History Assessment of Occupational Performance Level of Clinical Decision Making Complexity Score   Occupational Profile:   Corrie Calix is a 57 y.o. female who is currently employed Corrie Calix has difficulty with  ADLs and IADLs as listed previously, which  affecting his/her daily functional abilities.      Comorbidities:    has a past medical history of Allergy, Anti-polysaccharide antibody deficiency, Asthma, Pleurisy, and Pneumothorax.    Medical and Therapy History Review:   Expanded               Performance Deficits    Physical:  Joint Mobility  Muscle Power/Strength  Muscle Endurance  Pain    Cognitive:  No Deficits    Psychosocial:    No Deficits     Clinical Decision Making:  moderate    Assessment Process:  Problem-Focused Assessments    Modification/Need for Assistance:  Not Necessary    Intervention Selection:  Several Treatment Options       moderate  Based on PMHX, co morbidities , data from assessments and functional level of assistance required with task and clinical presentation directly impacting function.         Goals:    LTG's (12 weeks):  1)   Increase ROM 40 degrees in shoulder flexion to increase functional use for overhead reaching.  2)   Increase ROM 50 degrees in shoulder abduction to increase functional use for overhead reaching.  3)   Increase ROM 20 degrees in shoulder extension to increase  functional use for reaching behind car seat.  4)   Increase ROM 60 degrees in shoulder external rotation to increase functional use for washing hair.  5)   Increase ROM 40 degrees in shoulder internal rotation to increase functional use for fastening bra.  6)   Decrease complaints of pain to  2 out of 10 at worst to increase functional hand use for ADL/work/leisure activities.  7)   Pt will return to near to prior level of function for ADLs and household management reporting I or Mod I with ADLs (dressing, feeding, grooming, toileting).     STG's (6 weeks)  1)   Patient to be IND with HEP and modalities for pain/edema managment.  2)   Increase ROM 20 degrees in shoulder flexion to increase functional use for overhead reaching.  3)   Increase ROM 25 degrees in shoulder abduction to increase functional use for overhead reaching.  4)   Increase ROM 10 degrees in shoulder extension to increase functional use for reaching behind car seat.  5)   Increase ROM 30 degrees in shoulder external rotation to increase functional use for washing hair.  6)   Increase ROM 20 degrees in shoulder internal rotation to increase functional use for fastening bra.  7)   Decrease complaints of pain to  4 out of 10 at worst to increase functional hand use for ADL/work/leisure activities.    Plan     Pt to be treated by Occupational Therapy 2 times per week for 12 weeks during the certification period from 8/31/2022 to 12/1/2022 to achieve the established goals.     Treatment to include: Manual therapy/joint mobilizations, Modalities for pain management, US 3 mhz, Therapeutic exercises/activities., Strengthening, and Joint Protection, as well as any other treatments deemed necessary based on the patient's needs or progress.     GARRETT Freeman  OTR/L, CHT  Occupational therapist, Certified Hand Therapist

## 2022-09-02 NOTE — PROGRESS NOTES
I have personally taken the history and examined this patient. I agree with the resident's note as stated above.   57 y.o. yo female with bilateral severe carpal tunnel syndrome and left shoulder pain most likely due to adhesive capsulitis.         Plan: Discussed with patient treatment options. Recommended surgery for carpal tunnel release both sides. Patient would like to do left side first however she cannot do surgery until at least mid October which is 6 weeks from now due to her job. For this reason I feel comfortable providing a CSI into left shoulder today for suspected frozen shoulder. Will also order PT for left shoulder ROM.

## 2022-09-06 DIAGNOSIS — G56.02 CARPAL TUNNEL SYNDROME OF LEFT WRIST: Primary | ICD-10-CM

## 2022-09-06 NOTE — PROGRESS NOTES
Occupational Therapy Daily Treatment Note     Date: 9/7/2022  Name: Corrie Calix  Clinic Number: [id number]    Therapy Diagnosis:   Encounter Diagnosis   Name Primary?    Chronic left shoulder pain Yes     Physician: Elvis Cody MD    Medical Diagnosis: M75.02 (ICD-10-CM) - Adhesive capsulitis of left shoulder  Physician Orders: Left shoulder frozen shoulder. Work on range of motion and strengthening  Evaluation Date: 8/31/2022  Plan of Care Certification Date: 12/1/2022  Authorization Period: 12/31/2022  Surgery Date and Procedure: N/A  Date of Return to MD: JHONNY     Visit #: 4 of 20  Time In: 5:15 PM  Time Out: 6:15 PM  Total Billable Time: 60 min     Precautions: Standard, Standard      Subjective     Pt reports: Significantly improved symptoms today  Response to previous treatment:Jimmie well    Pain: 0/10    Objective     Corrie received the following supervised modalities after being cleared for contraindications for 10 minutes in order to promote increased blood flow and tissue elasticity:   -MH to left shld    Corrie received the following manual therapy techniques for 20 minutes in order to maximize tissue function and/or decrease pain:   -Joint mobs/PROM to left shld    oCrrie performed therapeutic exercises to maximize upper extremity motion and/or strength for 10 minutes including:  -Upright rows green t-band 3x10  -Wall pushups 3x10  -AROM all planes 20 ea    Home Exercises and Education Provided     Education provided:   - Upgraded HEP  - Progress towards goals     Written Home Exercises Provided: yes.  Exercises were reviewed and Corrie was able to demonstrate them prior to the end of the session.  Corrie demonstrated good  understanding of the HEP provided.   .   See EMR under Patient Instructions for exercises provided 9/7/2022.        Assessment     Pt would continue to benefit from skilled OT to maximize LUE function.     Corrie is progressing towards her goals and there are no updates to  goals at this time. Pt prognosis is Fair.     Pt will continue to benefit from skilled outpatient occupational therapy to address the deficits listed in the problem list on initial evaluation provide pt/family education and to maximize pt's level of independence in the home and community environment.     Anticipated barriers to therapy: Limited efficacy of therapeutic intervention as it relates to adhesive capsulitis      Pt's spiritual, cultural and educational needs considered and pt agreeable to plan of care and goals.    Goals:   LTG's (12 weeks):  1)   Increase ROM 40 degrees in shoulder flexion to increase functional use for overhead reaching.  2)   Increase ROM 50 degrees in shoulder abduction to increase functional use for overhead reaching.  3)   Increase ROM 20 degrees in shoulder extension to increase functional use for reaching behind car seat.  4)   Increase ROM 60 degrees in shoulder external rotation to increase functional use for washing hair.  5)   Increase ROM 40 degrees in shoulder internal rotation to increase functional use for fastening bra.  6)   Decrease complaints of pain to  2 out of 10 at worst to increase functional hand use for ADL/work/leisure activities.  7)   Pt will return to near to prior level of function for ADLs and household management reporting I or Mod I with ADLs (dressing, feeding, grooming, toileting).      STG's (6 weeks)  1)   Patient to be IND with HEP and modalities for pain/edema managment.  2)   Increase ROM 20 degrees in shoulder flexion to increase functional use for overhead reaching.  3)   Increase ROM 25 degrees in shoulder abduction to increase functional use for overhead reaching.  4)   Increase ROM 10 degrees in shoulder extension to increase functional use for reaching behind car seat.  5)   Increase ROM 30 degrees in shoulder external rotation to increase functional use for washing hair.  6)   Increase ROM 20 degrees in shoulder internal rotation to increase  functional use for fastening bra.  7)   Decrease complaints of pain to  4 out of 10 at worst to increase functional hand use for ADL/work/leisure activities.    Plan   Cont OT to address above goals.        GARRETT Freeman OTR/L, CHT

## 2022-09-07 ENCOUNTER — PATIENT MESSAGE (OUTPATIENT)
Dept: ADMINISTRATIVE | Facility: HOSPITAL | Age: 57
End: 2022-09-07
Payer: COMMERCIAL

## 2022-09-07 ENCOUNTER — CLINICAL SUPPORT (OUTPATIENT)
Dept: REHABILITATION | Facility: HOSPITAL | Age: 57
End: 2022-09-07
Attending: INTERNAL MEDICINE
Payer: COMMERCIAL

## 2022-09-07 DIAGNOSIS — G89.29 CHRONIC LEFT SHOULDER PAIN: Primary | ICD-10-CM

## 2022-09-07 DIAGNOSIS — M25.512 CHRONIC LEFT SHOULDER PAIN: Primary | ICD-10-CM

## 2022-09-07 PROCEDURE — 97140 MANUAL THERAPY 1/> REGIONS: CPT | Mod: PO

## 2022-09-07 PROCEDURE — 97110 THERAPEUTIC EXERCISES: CPT | Mod: PO

## 2022-09-07 PROCEDURE — 97010 HOT OR COLD PACKS THERAPY: CPT | Mod: PO

## 2022-09-07 RX ORDER — TRIAMCINOLONE ACETONIDE 40 MG/ML
80 INJECTION, SUSPENSION INTRA-ARTICULAR; INTRAMUSCULAR
Status: DISCONTINUED | OUTPATIENT
Start: 2022-08-30 | End: 2022-09-07 | Stop reason: HOSPADM

## 2022-09-07 NOTE — PROCEDURES
Large Joint Aspiration/Injection: L subacromial bursa    Date/Time: 8/30/2022 3:30 PM  Performed by: Catie Piper MD  Authorized by: Catie Piper MD     Consent Done?:  Yes (Verbal)  Indications:  Pain  Timeout: prior to procedure the correct patient, procedure, and site was verified    Prep: patient was prepped and draped in usual sterile fashion      Local anesthesia used?: Yes    Anesthesia:  Local infiltration  Local anesthetic:  Lidocaine 1% without epinephrine    Details:  Needle Size:  22 G  Approach:  Posterior  Location:  Shoulder  Site:  L subacromial bursa  Medications:  80 mg triamcinolone acetonide 40 mg/mL

## 2022-09-07 NOTE — PATIENT INSTRUCTIONS
Strengthening: Resisted Row    Face anchor at waist height, medium to wide stance. Thumbs up, pull arms back, squeezing shoulder blades together. Do not shrug up. Slowly return to start.  Repeat 10 times per set. Do 1-3 sets per session. Do 1 session per day.      Strengthening: Wall Push-Up        With arms slightly wider apart than shoulder width, and feet 18-24 inches from wall, gently lean body toward wall.  Repeat 10 times per set. Do 1-3 sets per session. Do 1 sessions per day.

## 2022-09-09 NOTE — PROGRESS NOTES
Occupational Therapy Daily Treatment Note     Date: 9/12/2022  Name: Corrie Calix  Clinic Number: [id number]    Therapy Diagnosis:   Encounter Diagnosis   Name Primary?    Chronic left shoulder pain Yes       Physician: Elvis Cody MD    Medical Diagnosis: M75.02 (ICD-10-CM) - Adhesive capsulitis of left shoulder  Physician Orders: Left shoulder frozen shoulder. Work on range of motion and strengthening  Evaluation Date: 8/31/2022  Plan of Care Certification Date: 12/1/2022  Authorization Period: 12/31/2022  Surgery Date and Procedure: N/A  Date of Return to MD: JHONNY     Visit #: 4 of 20  Time In: 4:15 PM  Time Out: 5:00 PM  Total Billable Time: 60 min     Precautions: Standard, Standard      Subjective     Pt reports: Feeling dramatically better  Response to previous treatment:Jimmie well    Pain: 0/10    Objective     Corrie received the following supervised modalities after being cleared for contraindications for 10 minutes in order to promote increased blood flow and tissue elasticity:   -MH to left shld    Corrie received the following manual therapy techniques for 20 minutes in order to maximize tissue function and/or decrease pain:   -Joint mobs/PROM to left shld    Corrie performed therapeutic exercises to maximize upper extremity motion and/or strength for 10 minutes including:  -Doorjamb stretches 10 count x 10  -UBE 4' F  -Supine dowel 10 count x 10    Home Exercises and Education Provided     Education provided:   - Upgraded HEP  - Progress towards goals     Written Home Exercises Provided: yes.  Exercises were reviewed and Corrie was able to demonstrate them prior to the end of the session.  Corrie demonstrated good  understanding of the HEP provided.   .   See EMR under Patient Instructions for exercises provided today.        Assessment     Iman is rapidly improving since initial evaluation.  Pt would continue to benefit from skilled OT to maximize LUE function.    Corrie is progressing towards  her goals and there are no updates to goals at this time. Pt prognosis is Fair.     Pt will continue to benefit from skilled outpatient occupational therapy to address the deficits listed in the problem list on initial evaluation provide pt/family education and to maximize pt's level of independence in the home and community environment.     Anticipated barriers to therapy: Limited efficacy of therapeutic intervention as it relates to adhesive capsulitis      Pt's spiritual, cultural and educational needs considered and pt agreeable to plan of care and goals.    Goals:   LTG's (12 weeks):  1)   Increase ROM 40 degrees in shoulder flexion to increase functional use for overhead reaching.  2)   Increase ROM 50 degrees in shoulder abduction to increase functional use for overhead reaching.  3)   Increase ROM 20 degrees in shoulder extension to increase functional use for reaching behind car seat.  4)   Increase ROM 60 degrees in shoulder external rotation to increase functional use for washing hair.  5)   Increase ROM 40 degrees in shoulder internal rotation to increase functional use for fastening bra.  6)   Decrease complaints of pain to  2 out of 10 at worst to increase functional hand use for ADL/work/leisure activities.  7)   Pt will return to near to prior level of function for ADLs and household management reporting I or Mod I with ADLs (dressing, feeding, grooming, toileting).      STG's (6 weeks)  1)   Patient to be IND with HEP and modalities for pain/edema managment.  2)   Increase ROM 20 degrees in shoulder flexion to increase functional use for overhead reaching.  3)   Increase ROM 25 degrees in shoulder abduction to increase functional use for overhead reaching.  4)   Increase ROM 10 degrees in shoulder extension to increase functional use for reaching behind car seat.  5)   Increase ROM 30 degrees in shoulder external rotation to increase functional use for washing hair.  6)   Increase ROM 20 degrees in  shoulder internal rotation to increase functional use for fastening bra.  7)   Decrease complaints of pain to  4 out of 10 at worst to increase functional hand use for ADL/work/leisure activities.    Plan   Cont OT to address above goals.        GARRETT Freeman OTR/L, CHT

## 2022-09-12 ENCOUNTER — CLINICAL SUPPORT (OUTPATIENT)
Dept: REHABILITATION | Facility: HOSPITAL | Age: 57
End: 2022-09-12
Attending: INTERNAL MEDICINE
Payer: COMMERCIAL

## 2022-09-12 DIAGNOSIS — G89.29 CHRONIC LEFT SHOULDER PAIN: Primary | ICD-10-CM

## 2022-09-12 DIAGNOSIS — M25.512 CHRONIC LEFT SHOULDER PAIN: Primary | ICD-10-CM

## 2022-09-12 PROCEDURE — 97010 HOT OR COLD PACKS THERAPY: CPT | Mod: PO

## 2022-09-12 PROCEDURE — 97140 MANUAL THERAPY 1/> REGIONS: CPT | Mod: PO

## 2022-09-12 PROCEDURE — 97530 THERAPEUTIC ACTIVITIES: CPT | Mod: PO

## 2022-09-12 NOTE — PATIENT INSTRUCTIONS
ROM: External Rotation (Alternate)        Keep palm of left hand against door frame and elbow bent at 90°. Turn body from fixed hand until stretch is felt. Hold 10 seconds.  Repeat 10 times. Do 3 sessions per day.            ROM: Flexion - Wand (Supine)        Lie on back holding wand. Raise arms over head.   Repeat 10 times per set. Do 1-3 sets per session. Do 3 sessions per day.

## 2022-09-13 ENCOUNTER — PATIENT MESSAGE (OUTPATIENT)
Dept: RHEUMATOLOGY | Facility: CLINIC | Age: 57
End: 2022-09-13
Payer: COMMERCIAL

## 2022-09-13 ENCOUNTER — PATIENT MESSAGE (OUTPATIENT)
Dept: INTERNAL MEDICINE | Facility: CLINIC | Age: 57
End: 2022-09-13
Payer: COMMERCIAL

## 2022-09-13 ENCOUNTER — PATIENT MESSAGE (OUTPATIENT)
Dept: NEUROLOGY | Facility: CLINIC | Age: 57
End: 2022-09-13
Payer: COMMERCIAL

## 2022-09-13 ENCOUNTER — PATIENT MESSAGE (OUTPATIENT)
Dept: SURGERY | Facility: HOSPITAL | Age: 57
End: 2022-09-13
Payer: COMMERCIAL

## 2022-09-14 ENCOUNTER — DOCUMENTATION ONLY (OUTPATIENT)
Dept: ORTHOPEDICS | Facility: CLINIC | Age: 57
End: 2022-09-14
Payer: COMMERCIAL

## 2022-09-14 ENCOUNTER — PATIENT MESSAGE (OUTPATIENT)
Dept: INTERNAL MEDICINE | Facility: CLINIC | Age: 57
End: 2022-09-14
Payer: COMMERCIAL

## 2022-09-14 ENCOUNTER — PATIENT MESSAGE (OUTPATIENT)
Dept: ADMINISTRATIVE | Facility: HOSPITAL | Age: 57
End: 2022-09-14
Payer: COMMERCIAL

## 2022-09-14 DIAGNOSIS — Z12.11 SCREENING FOR COLON CANCER: ICD-10-CM

## 2022-09-14 NOTE — PROGRESS NOTES
Per pts request, Cx L CTR surgery 10/14/22. Cx associated PO appt. Book updated, Belén notified.

## 2022-09-16 NOTE — PROGRESS NOTES
Occupational Therapy Daily Treatment Note     Date: 9/19/2022  Name: Corrie Calix  Clinic Number: [id number]    Therapy Diagnosis:   Encounter Diagnosis   Name Primary?    Chronic left shoulder pain Yes         Physician: Elvis Cody MD    Medical Diagnosis: M75.02 (ICD-10-CM) - Adhesive capsulitis of left shoulder  Physician Orders: Left shoulder frozen shoulder. Work on range of motion and strengthening  Evaluation Date: 8/31/2022  Plan of Care Certification Date: 12/1/2022  Authorization Period: 12/31/2022  Surgery Date and Procedure: N/A  Date of Return to MD: JHONNY     Visit #: 6 of 20  Time In: 4:15 PM  Time Out: 5:00 PM  Total Billable Time: min     Precautions: Standard, Standard      Subjective     Pt reports: Feeling dramatically better  Response to previous treatment:Jimmie well    Pain: 0/10    Objective     Corrie received the following supervised modalities after being cleared for contraindications for 10 minutes in order to promote increased blood flow and tissue elasticity:   -MH to left shld    Corrie received the following manual therapy techniques for 15 minutes in order to maximize tissue function and/or decrease pain:   -Joint mobs/PROM to left shld    Corrie performed therapeutic exercises to maximize upper extremity motion and/or strength for 15 minutes including:  -Doorjamb stretches 10 count x 10  -UBE 3'F/3'B  -Supine dowel 10 count x 10  -Wall pushups 3x10  -Scaption 3x10 2#    Shoulder AROM  SF  SE  SA  ER  IR    147 50 146 90 65   +19 +8 +61 +80 +23        Home Exercises and Education Provided     Education provided:   - Upgraded HEP  - Progress towards goals     Written Home Exercises Provided: yes.  Exercises were reviewed and Corrie was able to demonstrate them prior to the end of the session.  Corrie demonstrated good  understanding of the HEP provided.   .   See EMR under Patient Instructions for exercises provided today.        Assessment     Iman is rapidly improving since  initial evaluation.  Pt would continue to benefit from skilled OT to maximize LUE function.    Corrie is progressing towards her goals and there are no updates to goals at this time. Pt prognosis is Fair.     Pt will continue to benefit from skilled outpatient occupational therapy to address the deficits listed in the problem list on initial evaluation provide pt/family education and to maximize pt's level of independence in the home and community environment.     Anticipated barriers to therapy: Limited efficacy of therapeutic intervention as it relates to adhesive capsulitis      Pt's spiritual, cultural and educational needs considered and pt agreeable to plan of care and goals.    Goals:   LTG's (12 weeks):  1)   Increase ROM 40 degrees in shoulder flexion to increase functional use for overhead reaching. Progressing  2)   Increase ROM 50 degrees in shoulder abduction to increase functional use for overhead reaching. Met  3)   Increase ROM 20 degrees in shoulder extension to increase functional use for reaching behind car seat. Progressing  4)   Increase ROM 60 degrees in shoulder external rotation to increase functional use for washing hair. Progressing  5)   Increase ROM 40 degrees in shoulder internal rotation to increase functional use for fastening bra. Met  6)   Decrease complaints of pain to  2 out of 10 at worst to increase functional hand use for ADL/work/leisure activities. Progressing  7)   Pt will return to near to prior level of function for ADLs and household management reporting I or Mod I with ADLs (dressing, feeding, grooming, toileting). Progressing     STG's (6 weeks)  1)   Patient to be IND with HEP and modalities for pain/edema managment. Progressing  2)   Increase ROM 20 degrees in shoulder flexion to increase functional use for overhead reaching. Met  3)   Increase ROM 25 degrees in shoulder abduction to increase functional use for overhead reaching. Met  4)   Increase ROM 10 degrees in  shoulder extension to increase functional use for reaching behind car seat. Progressing  5)   Increase ROM 30 degrees in shoulder external rotation to increase functional use for washing hair. Met  6)   Increase ROM 20 degrees in shoulder internal rotation to increase functional use for fastening bra. Met  7)   Decrease complaints of pain to  4 out of 10 at worst to increase functional hand use for ADL/work/leisure activities. Progressing    Plan   Cont OT to address above goals.        GARRETT Freeman OTR/L, CHT

## 2022-09-19 ENCOUNTER — CLINICAL SUPPORT (OUTPATIENT)
Dept: REHABILITATION | Facility: HOSPITAL | Age: 57
End: 2022-09-19
Attending: INTERNAL MEDICINE
Payer: COMMERCIAL

## 2022-09-19 DIAGNOSIS — M25.512 CHRONIC LEFT SHOULDER PAIN: Primary | ICD-10-CM

## 2022-09-19 DIAGNOSIS — G89.29 CHRONIC LEFT SHOULDER PAIN: Primary | ICD-10-CM

## 2022-09-19 PROCEDURE — 97140 MANUAL THERAPY 1/> REGIONS: CPT | Mod: PO

## 2022-09-19 PROCEDURE — 97110 THERAPEUTIC EXERCISES: CPT | Mod: PO

## 2022-09-19 PROCEDURE — 97010 HOT OR COLD PACKS THERAPY: CPT | Mod: PO

## 2022-09-22 NOTE — PROGRESS NOTES
Occupational Therapy Daily Treatment Note     Date: 9/26/2022  Name: Corrie Calix  Clinic Number: [id number]    Therapy Diagnosis:   Encounter Diagnosis   Name Primary?    Chronic left shoulder pain Yes           Physician: Elvis Cody MD    Medical Diagnosis: M75.02 (ICD-10-CM) - Adhesive capsulitis of left shoulder  Physician Orders: Left shoulder frozen shoulder. Work on range of motion and strengthening  Evaluation Date: 8/31/2022  Plan of Care Certification Date: 12/1/2022  Authorization Period: 12/31/2022  Surgery Date and Procedure: N/A  Date of Return to MD: JHONNY     Visit #: 6 of 20  Time In: 4:15 PM  Time Out: 5:00 PM  Total Billable Time: min     Precautions: Standard, Standard      Subjective     Pt reports: Feeling dramatically better  Response to previous treatment:Jimmie well    Pain: 0/10    Objective     Corrie received the following supervised modalities after being cleared for contraindications for 10 minutes in order to promote increased blood flow and tissue elasticity:   -MH to left shld    Corrie received the following manual therapy techniques for 15 minutes in order to maximize tissue function and/or decrease pain:   -Joint mobs/PROM to left shld    Corrie performed therapeutic exercises to maximize upper extremity motion and/or strength for 15 minutes including:  -Shld ladder 10  -Wall washing SF/SA 20  -Doorjamb stretches 10 count x 10  -UBE 3'F/3'B  -Wall pushups 3x10    Home Exercises and Education Provided     Education provided:   - Upgraded HEP  - Progress towards goals     Written Home Exercises Provided: yes.  Exercises were reviewed and Corrie was able to demonstrate them prior to the end of the session.  Corrie demonstrated good  understanding of the HEP provided.   .   See EMR under Patient Instructions for exercises provided today.        Assessment     Iman is rapidly improving since initial evaluation.  Pt would continue to benefit from skilled OT to maximize LUE  function.    Corrie is progressing towards her goals and there are no updates to goals at this time. Pt prognosis is Fair.     Pt will continue to benefit from skilled outpatient occupational therapy to address the deficits listed in the problem list on initial evaluation provide pt/family education and to maximize pt's level of independence in the home and community environment.     Anticipated barriers to therapy: Limited efficacy of therapeutic intervention as it relates to adhesive capsulitis      Pt's spiritual, cultural and educational needs considered and pt agreeable to plan of care and goals.    Goals:   LTG's (12 weeks):  1)   Increase ROM 40 degrees in shoulder flexion to increase functional use for overhead reaching. Progressing  2)   Increase ROM 50 degrees in shoulder abduction to increase functional use for overhead reaching. Met  3)   Increase ROM 20 degrees in shoulder extension to increase functional use for reaching behind car seat. Progressing  4)   Increase ROM 60 degrees in shoulder external rotation to increase functional use for washing hair. Progressing  5)   Increase ROM 40 degrees in shoulder internal rotation to increase functional use for fastening bra. Met  6)   Decrease complaints of pain to  2 out of 10 at worst to increase functional hand use for ADL/work/leisure activities. Progressing  7)   Pt will return to near to prior level of function for ADLs and household management reporting I or Mod I with ADLs (dressing, feeding, grooming, toileting). Progressing     STG's (6 weeks)  1)   Patient to be IND with HEP and modalities for pain/edema managment. Progressing  2)   Increase ROM 20 degrees in shoulder flexion to increase functional use for overhead reaching. Met  3)   Increase ROM 25 degrees in shoulder abduction to increase functional use for overhead reaching. Met  4)   Increase ROM 10 degrees in shoulder extension to increase functional use for reaching behind car seat.  Progressing  5)   Increase ROM 30 degrees in shoulder external rotation to increase functional use for washing hair. Met  6)   Increase ROM 20 degrees in shoulder internal rotation to increase functional use for fastening bra. Met  7)   Decrease complaints of pain to  4 out of 10 at worst to increase functional hand use for ADL/work/leisure activities. Progressing    Plan   Cont OT to address above goals.        GARRETT Freeman OTR/L, CHT

## 2022-09-26 ENCOUNTER — CLINICAL SUPPORT (OUTPATIENT)
Dept: REHABILITATION | Facility: HOSPITAL | Age: 57
End: 2022-09-26
Attending: INTERNAL MEDICINE
Payer: COMMERCIAL

## 2022-09-26 DIAGNOSIS — G89.29 CHRONIC LEFT SHOULDER PAIN: Primary | ICD-10-CM

## 2022-09-26 DIAGNOSIS — M25.512 CHRONIC LEFT SHOULDER PAIN: Primary | ICD-10-CM

## 2022-09-26 PROCEDURE — 97140 MANUAL THERAPY 1/> REGIONS: CPT | Mod: PO

## 2022-09-26 PROCEDURE — 97110 THERAPEUTIC EXERCISES: CPT | Mod: PO

## 2022-09-26 PROCEDURE — 97010 HOT OR COLD PACKS THERAPY: CPT | Mod: PO

## 2022-09-30 NOTE — PROGRESS NOTES
Occupational Therapy Daily Treatment Note     Date: 10/3/2022  Name: Corrie Calix  Clinic Number: [id number]    Therapy Diagnosis:   Encounter Diagnosis   Name Primary?    Chronic left shoulder pain Yes             Physician: Elvis Cody MD    Medical Diagnosis: M75.02 (ICD-10-CM) - Adhesive capsulitis of left shoulder  Physician Orders: Left shoulder frozen shoulder. Work on range of motion and strengthening  Evaluation Date: 8/31/2022  Plan of Care Certification Date: 12/1/2022  Authorization Period: 12/31/2022  Surgery Date and Procedure: N/A  Date of Return to MD: JHONNY     Visit #: 8 of 20  Time In: 4:15 PM  Time Out: 5:00 PM  Total Billable Time: min     Precautions: Standard, Standard      Subjective     Pt reports: No new c/o  Response to previous treatment:Jimmie well    Pain: 0/10    Objective     Corrie received the following supervised modalities after being cleared for contraindications for 10 minutes in order to promote increased blood flow and tissue elasticity:   -MH to left shld    Corrie received the following manual therapy techniques for 15 minutes in order to maximize tissue function and/or decrease pain:   -Joint mobs/PROM to left shld    Corrie performed therapeutic exercises to maximize upper extremity motion and/or strength for 15 minutes including:  -Shld ladder 10  -UBE 3'F/3'B  -IR blue t-band, ER orange t-band 2x10    Home Exercises and Education Provided     Education provided:   - Upgraded HEP  - Progress towards goals     Written Home Exercises Provided: yes.  Exercises were reviewed and Corrie was able to demonstrate them prior to the end of the session.  Corrie demonstrated good  understanding of the HEP provided.   .   See EMR under Patient Instructions for exercises provided today.        Assessment     Iman is rapidly improving since initial evaluation.  Pt would continue to benefit from skilled OT to maximize LUE function.    Corrie is progressing towards her goals and there  are no updates to goals at this time. Pt prognosis is Fair.     Pt will continue to benefit from skilled outpatient occupational therapy to address the deficits listed in the problem list on initial evaluation provide pt/family education and to maximize pt's level of independence in the home and community environment.     Anticipated barriers to therapy: Limited efficacy of therapeutic intervention as it relates to adhesive capsulitis      Pt's spiritual, cultural and educational needs considered and pt agreeable to plan of care and goals.    Goals:   LTG's (12 weeks):  1)   Increase ROM 40 degrees in shoulder flexion to increase functional use for overhead reaching. Progressing  2)   Increase ROM 50 degrees in shoulder abduction to increase functional use for overhead reaching. Met  3)   Increase ROM 20 degrees in shoulder extension to increase functional use for reaching behind car seat. Progressing  4)   Increase ROM 60 degrees in shoulder external rotation to increase functional use for washing hair. Progressing  5)   Increase ROM 40 degrees in shoulder internal rotation to increase functional use for fastening bra. Met  6)   Decrease complaints of pain to  2 out of 10 at worst to increase functional hand use for ADL/work/leisure activities. Progressing  7)   Pt will return to near to prior level of function for ADLs and household management reporting I or Mod I with ADLs (dressing, feeding, grooming, toileting). Progressing     STG's (6 weeks)  1)   Patient to be IND with HEP and modalities for pain/edema managment. Progressing  2)   Increase ROM 20 degrees in shoulder flexion to increase functional use for overhead reaching. Met  3)   Increase ROM 25 degrees in shoulder abduction to increase functional use for overhead reaching. Met  4)   Increase ROM 10 degrees in shoulder extension to increase functional use for reaching behind car seat. Progressing  5)   Increase ROM 30 degrees in shoulder external rotation  to increase functional use for washing hair. Met  6)   Increase ROM 20 degrees in shoulder internal rotation to increase functional use for fastening bra. Met  7)   Decrease complaints of pain to  4 out of 10 at worst to increase functional hand use for ADL/work/leisure activities. Progressing    Plan   Cont OT to address above goals.        GARRETT Freeman OTR/L, CHT

## 2022-10-03 ENCOUNTER — PATIENT MESSAGE (OUTPATIENT)
Dept: ADMINISTRATIVE | Facility: HOSPITAL | Age: 57
End: 2022-10-03
Payer: COMMERCIAL

## 2022-10-03 ENCOUNTER — CLINICAL SUPPORT (OUTPATIENT)
Dept: REHABILITATION | Facility: HOSPITAL | Age: 57
End: 2022-10-03
Attending: INTERNAL MEDICINE
Payer: COMMERCIAL

## 2022-10-03 DIAGNOSIS — G89.29 CHRONIC LEFT SHOULDER PAIN: Primary | ICD-10-CM

## 2022-10-03 DIAGNOSIS — M25.512 CHRONIC LEFT SHOULDER PAIN: Primary | ICD-10-CM

## 2022-10-03 PROCEDURE — 97140 MANUAL THERAPY 1/> REGIONS: CPT | Mod: PO

## 2022-10-03 PROCEDURE — 97010 HOT OR COLD PACKS THERAPY: CPT | Mod: PO

## 2022-10-03 PROCEDURE — 97530 THERAPEUTIC ACTIVITIES: CPT | Mod: PO

## 2022-10-10 ENCOUNTER — PATIENT MESSAGE (OUTPATIENT)
Dept: ADMINISTRATIVE | Facility: HOSPITAL | Age: 57
End: 2022-10-10
Payer: COMMERCIAL

## 2022-10-10 ENCOUNTER — CLINICAL SUPPORT (OUTPATIENT)
Dept: REHABILITATION | Facility: HOSPITAL | Age: 57
End: 2022-10-10
Attending: INTERNAL MEDICINE
Payer: COMMERCIAL

## 2022-10-10 DIAGNOSIS — M25.512 CHRONIC LEFT SHOULDER PAIN: Primary | ICD-10-CM

## 2022-10-10 DIAGNOSIS — G89.29 CHRONIC LEFT SHOULDER PAIN: Primary | ICD-10-CM

## 2022-10-10 PROCEDURE — 97010 HOT OR COLD PACKS THERAPY: CPT | Mod: PO

## 2022-10-10 PROCEDURE — 97110 THERAPEUTIC EXERCISES: CPT | Mod: PO

## 2022-10-10 PROCEDURE — 97140 MANUAL THERAPY 1/> REGIONS: CPT | Mod: PO

## 2022-10-10 NOTE — PROGRESS NOTES
Occupational Therapy Daily Treatment Note/Discharge Summary     Date: 10/10/2022  Name: Corrie Calix  Clinic Number: [id number]    Therapy Diagnosis:   Encounter Diagnosis   Name Primary?    Chronic left shoulder pain Yes               Physician: Elvis Cody MD    Medical Diagnosis: M75.02 (ICD-10-CM) - Adhesive capsulitis of left shoulder  Physician Orders: Left shoulder frozen shoulder. Work on range of motion and strengthening  Evaluation Date: 8/31/2022  Plan of Care Certification Date: 12/1/2022  Authorization Period: 12/31/2022  Surgery Date and Procedure: N/A  Date of Return to MD: JHONNY     Visit #: 9 of 20  Time In: 4:15 PM  Time Out: 5:00 PM  Total Billable Time: min     Precautions: Standard, Standard      Subjective     Pt reports: No new c/o  Response to previous treatment:Jimmie well    Pain: 0/10    Objective     Corrie received the following supervised modalities after being cleared for contraindications for 10 minutes in order to promote increased blood flow and tissue elasticity:   -MH to left shld    Corrie received the following manual therapy techniques for 10 minutes in order to maximize tissue function and/or decrease pain:   -Joint mobs/PROM to left shld    Corrie performed therapeutic exercises to maximize upper extremity motion and/or strength for 20 minutes including:  -Shld ladder 10  -UBE 3'F/3'B  -IR blue t-band, ER orange t-band 2x10    Range of Motion:   Left     Shoulder AROM  SF  SE  SA  ER  IR    152 52 168 90 68   +34 +10 +83 +80 +26      Manual Muscle Testing:      Shoulder          SF  SE  SA  ER  IR    5 5 5 4+ 4+        Home Exercises and Education Provided     Education provided:   - Progress towards goals     Written Home Exercises Provided: yes.  Exercises were reviewed and Corrie was able to demonstrate them prior to the end of the session.  Corrie demonstrated good  understanding of the HEP provided.   .   See EMR under Patient Instructions for exercises provided  previously.        Assessment     Iman appears to have achieved maximum benefit from OT and is ready to D/C to HEP.    Goals:   LTG's (12 weeks):  1)   Increase ROM 40 degrees in shoulder flexion to increase functional use for overhead reaching. Not Met  2)   Increase ROM 50 degrees in shoulder abduction to increase functional use for overhead reaching. Met  3)   Increase ROM 20 degrees in shoulder extension to increase functional use for reaching behind car seat. Met  4)   Increase ROM 60 degrees in shoulder external rotation to increase functional use for washing hair. Met  5)   Increase ROM 40 degrees in shoulder internal rotation to increase functional use for fastening bra. Met  6)   Decrease complaints of pain to  2 out of 10 at worst to increase functional hand use for ADL/work/leisure activities. Met  7)   Pt will return to near to prior level of function for ADLs and household management reporting I or Mod I with ADLs (dressing, feeding, grooming, toileting). Met     STG's (6 weeks)  1)   Patient to be IND with HEP and modalities for pain/edema managment. Met  2)   Increase ROM 20 degrees in shoulder flexion to increase functional use for overhead reaching. Met  3)   Increase ROM 25 degrees in shoulder abduction to increase functional use for overhead reaching. Met  4)   Increase ROM 10 degrees in shoulder extension to increase functional use for reaching behind car seat. Met  5)   Increase ROM 30 degrees in shoulder external rotation to increase functional use for washing hair. Met  6)   Increase ROM 20 degrees in shoulder internal rotation to increase functional use for fastening bra. Met  7)   Decrease complaints of pain to  4 out of 10 at worst to increase functional hand use for ADL/work/leisure activities. Met    Plan   D/C from OT        GARRETT Freeman OTR/L, CHT

## 2022-10-31 ENCOUNTER — PATIENT OUTREACH (OUTPATIENT)
Dept: ADMINISTRATIVE | Facility: HOSPITAL | Age: 57
End: 2022-10-31
Payer: COMMERCIAL

## 2022-10-31 NOTE — PROGRESS NOTES
Health Maintenance Due   Topic Date Due    Cervical Cancer Screening  Never done    Lipid Panel  Never done    Shingles Vaccine (1 of 2) Never done    Colorectal Cancer Screening  Never done    Pneumococcal Vaccines (Age 0-64) (2 - PPSV23 if available, else PCV20) 08/15/2019    Mammogram  11/25/2020     Triggered LINKS and reconciled immunizations. Updated Care Everywhere. Imported outside mammography results from Care Everywhere into . Checked DIS for outside mammography results; no results found. Checked for outside lab results in [facility]. Record request sent to Dr. Maria Luz Eduardo asking for a copy of pt's most recent pap smear and mammography results. Chart review completed.

## 2022-10-31 NOTE — LETTER
AUTHORIZATION FOR RELEASE OF   CONFIDENTIAL INFORMATION    Dear Dr. Maria Luz Eduardo,    We are seeing Corrie Calix, date of birth 1965, in the clinic at [facility]. Elvis Cody MD is the patient's PCP. Corrie Calix has an outstanding lab/procedure at the time we reviewed her chart. In order to help keep her health information updated, she has authorized us to request the following medical record(s):        ( X )  MAMMOGRAM                                    (  )  COLONOSCOPY      ( X )  PAP SMEAR                                        (  )  OUTSIDE LAB RESULTS     (  )  DEXA SCAN                                          (  )  EYE EXAM            (  )  FOOT EXAM                                          (  )  ENTIRE RECORD     (  )  OUTSIDE IMMUNIZATIONS                 (  )  _______________         Please fax records to Elvis Cody MD, [phone]     If you have any questions, please contact BRITTNEY Couch at [phone].           Patient Name: Corrie Calix  : 1965  Patient Phone #: [phone]

## 2022-11-01 ENCOUNTER — PATIENT MESSAGE (OUTPATIENT)
Dept: INTERNAL MEDICINE | Facility: CLINIC | Age: 57
End: 2022-11-01
Payer: COMMERCIAL

## 2022-11-01 ENCOUNTER — TELEPHONE (OUTPATIENT)
Dept: INTERNAL MEDICINE | Facility: CLINIC | Age: 57
End: 2022-11-01
Payer: COMMERCIAL

## 2022-11-01 DIAGNOSIS — J45.909 ASTHMA, UNSPECIFIED ASTHMA SEVERITY, UNSPECIFIED WHETHER COMPLICATED, UNSPECIFIED WHETHER PERSISTENT: Primary | ICD-10-CM

## 2022-11-01 DIAGNOSIS — J45.20 MILD INTERMITTENT ASTHMA WITHOUT COMPLICATION: Primary | ICD-10-CM

## 2022-11-01 DIAGNOSIS — U07.1 COVID-19 VIRUS INFECTION: ICD-10-CM

## 2022-11-01 NOTE — TELEPHONE ENCOUNTER
----- Message from Lucero Garrett sent at 11/1/2022 11:18 AM CDT -----  Contact: [phone]  Patient called, said that she have a breathing machine and put sodium chloride but the machine is not working. Would like to know if the doctor can call one of the pharmacy and order a new machine. [facility]/pharmacy #[id number] - [place], [place] - [address] Phone:  [phone]  Fax: [phone]. Please call and advise. Thank you

## 2022-11-01 NOTE — TELEPHONE ENCOUNTER
----- Message from Vikki Hope sent at 11/1/2022  1:47 PM CDT -----  Contact: pt [phone]  Patient calling again regarding treatment. See previous messages and contact to discuss    Please call and advise.    Thank You

## 2022-11-01 NOTE — TELEPHONE ENCOUNTER
----- Message from Shala Lott sent at 11/1/2022 10:01 AM CDT -----  Contact: [phone]  Patient is calling for Medical Advice regarding: Patient has tested positive for covid with a home test and her symptoms are stuffy head, drip down throat, diarrhea , immune deficiency, please call and advise.      How long has patient had these symptoms:2 days    Pharmacy name and phone#:[facility]/pharmacy #[id number] - [place], [place] - [address]   Phone:  [phone]  Fax:  [phone]      Comments: Please call and advise

## 2022-11-02 ENCOUNTER — PATIENT MESSAGE (OUTPATIENT)
Dept: PULMONOLOGY | Facility: CLINIC | Age: 57
End: 2022-11-02
Payer: COMMERCIAL

## 2022-11-02 ENCOUNTER — PATIENT OUTREACH (OUTPATIENT)
Dept: ADMINISTRATIVE | Facility: HOSPITAL | Age: 57
End: 2022-11-02
Payer: COMMERCIAL

## 2022-11-02 ENCOUNTER — TELEPHONE (OUTPATIENT)
Dept: INTERNAL MEDICINE | Facility: CLINIC | Age: 57
End: 2022-11-02
Payer: COMMERCIAL

## 2022-11-02 DIAGNOSIS — J47.9 BRONCHIECTASIS WITHOUT COMPLICATION: Primary | ICD-10-CM

## 2022-11-02 NOTE — TELEPHONE ENCOUNTER
Called and spoke to pt. Pt states she is doing ok, started Paxlovid today. Pt states she just needs someone to call in a nebulizer for her as hers broke. Reviewed isolation protocol and symptomatic treatment with patient. Explained to patient she should report to the ED for CP, SOB and confusion. Pt verbalized understanding.

## 2022-11-02 NOTE — TELEPHONE ENCOUNTER
----- Message from Shala Lott sent at 11/2/2022  1:48 PM CDT -----  Contact: [phone]  Patient is calling for Medical Advice regarding: Patient has covid and would like a Rx for the Paxlovid, patient has sent several messages and have not heard back about her with covid, please call and advise. Congested, body aches, no appetite , fatigue, low fever .     How long has patient had these symptoms: yesterday    Pharmacy name and phone#:[facility]/pharmacy #[id number] - [place] - [address]   Phone:  [phone]  Fax:  [phone]        Comments: Patient also need a new Rx for a nebulizer machine faxed to [facility], please call to confirm.

## 2022-11-02 NOTE — PROGRESS NOTES
Health Maintenance Due   Topic Date Due    Lipid Panel  Never done    Shingles Vaccine (1 of 2) Never done    Colorectal Cancer Screening  Never done    Pneumococcal Vaccines (Age 0-64) (2 - PPSV23 if available, else PCV20) 08/15/2019    Mammogram  11/25/2020     Triggered LINKS and reconciled immunizations. Updated Care Everywhere. Imported outside pap smear results into HM. Edited HM frequency to HPV/Cotest every 5 yrs. Received outside mammography results; already in HM. Chart review completed.

## 2022-11-02 NOTE — TELEPHONE ENCOUNTER
Message left for patient advising the paxlovid has been sent to pharmacy on file and that order for nebulizer will be sent to  for approval- Dolores QUINTANA MA

## 2022-11-02 NOTE — TELEPHONE ENCOUNTER
Pls make sure [facility] received order -   [facility] may have faxed yday.  Put another order on your desk

## 2022-11-17 ENCOUNTER — PATIENT OUTREACH (OUTPATIENT)
Dept: ADMINISTRATIVE | Facility: HOSPITAL | Age: 57
End: 2022-11-17
Payer: COMMERCIAL

## 2022-11-17 NOTE — PROGRESS NOTES
Health Maintenance Due   Topic Date Due    Lipid Panel  Never done    Shingles Vaccine (1 of 2) Never done    Colorectal Cancer Screening  Never done    Pneumococcal Vaccines (Age 0-64) (2 - PPSV23 if available, else PCV20) 08/15/2019    Mammogram  11/25/2020     HM updated. Requested mammogram record from Dr.Ruhe Sunshine Dia LPN   Clinical Care Coordinator  [facility]

## 2022-11-17 NOTE — LETTER
AUTHORIZATION FOR RELEASE OF   CONFIDENTIAL INFORMATION    Dear Dr.Hope Eduardo,    We are seeing Corrie Calix, date of birth 1965, in the clinic at [facility]. Elvis Cody MD is the patient's PCP. Corrie Calix has an outstanding lab/procedure at the time we reviewed her chart. In order to help keep her health information updated, she has authorized us to request the following medical record(s):        (  x)  MAMMOGRAM                                      (  )  COLONOSCOPY      ( x )  PAP SMEAR                                          (  )  OUTSIDE LAB RESULTS     (  )  DEXA SCAN                                          (  )  EYE EXAM            (  )  FOOT EXAM                                          (  )  ENTIRE RECORD     (  )  OUTSIDE IMMUNIZATIONS                 (  )  _______________         Please fax records to [facility], Samuel T Plost, MD, [phone]     If you have any questions, please contact Sunshine Dia LPN  at [phone].           Patient Name: Corrie Calix  : 1965  Patient Phone #: [phone]

## 2022-12-08 ENCOUNTER — PATIENT MESSAGE (OUTPATIENT)
Dept: PULMONOLOGY | Facility: CLINIC | Age: 57
End: 2022-12-08
Payer: COMMERCIAL

## 2022-12-08 ENCOUNTER — TELEPHONE (OUTPATIENT)
Dept: PULMONOLOGY | Facility: CLINIC | Age: 57
End: 2022-12-08
Payer: COMMERCIAL

## 2022-12-08 NOTE — TELEPHONE ENCOUNTER
Message       ----- Message -----   From: Corrie Calix   Sent: 12/5/2022   2:22 PM CST   To: [facility] Clinical Staff   Subject: Appointment Request                                Appointment Request From: Corrie Calix      With Provider: Stevie Hernadez MD [[facility]]      Preferred Date Range: 12/5/2022 - 12/31/2022      Preferred Times: Any Time      Reason for visit: The insurance company is requesting updated notes for my oscillatory vest so that they will make the last two payments.      Comments:   I must see Dr. Avendano before the end of the year so he can submit notes that I still need and am using my vest or insurance wont pay.

## 2022-12-20 ENCOUNTER — OFFICE VISIT (OUTPATIENT)
Dept: PULMONOLOGY | Facility: CLINIC | Age: 57
End: 2022-12-20
Payer: COMMERCIAL

## 2022-12-20 VITALS
HEART RATE: 86 BPM | WEIGHT: 108 LBS | HEIGHT: 67 IN | DIASTOLIC BLOOD PRESSURE: 76 MMHG | SYSTOLIC BLOOD PRESSURE: 114 MMHG | RESPIRATION RATE: 17 BRPM | OXYGEN SATURATION: 97 % | BODY MASS INDEX: 16.95 KG/M2

## 2022-12-20 DIAGNOSIS — J45.909 ASTHMA, UNSPECIFIED ASTHMA SEVERITY, UNSPECIFIED WHETHER COMPLICATED, UNSPECIFIED WHETHER PERSISTENT: ICD-10-CM

## 2022-12-20 DIAGNOSIS — J47.9 BRONCHIECTASIS WITHOUT COMPLICATION: Primary | ICD-10-CM

## 2022-12-20 DIAGNOSIS — J47.9 ADULT BRONCHIECTASIS: ICD-10-CM

## 2022-12-20 PROCEDURE — 3008F PR BODY MASS INDEX (BMI) DOCUMENTED: ICD-10-PCS | Mod: CPTII,S$GLB,, | Performed by: INTERNAL MEDICINE

## 2022-12-20 PROCEDURE — 99999 PR PBB SHADOW E&M-EST. PATIENT-LVL III: CPT | Mod: PBBFAC,,, | Performed by: INTERNAL MEDICINE

## 2022-12-20 PROCEDURE — 3074F PR MOST RECENT SYSTOLIC BLOOD PRESSURE < 130 MM HG: ICD-10-PCS | Mod: CPTII,S$GLB,, | Performed by: INTERNAL MEDICINE

## 2022-12-20 PROCEDURE — 1159F PR MEDICATION LIST DOCUMENTED IN MEDICAL RECORD: ICD-10-PCS | Mod: CPTII,S$GLB,, | Performed by: INTERNAL MEDICINE

## 2022-12-20 PROCEDURE — 3078F PR MOST RECENT DIASTOLIC BLOOD PRESSURE < 80 MM HG: ICD-10-PCS | Mod: CPTII,S$GLB,, | Performed by: INTERNAL MEDICINE

## 2022-12-20 PROCEDURE — 3044F HG A1C LEVEL LT 7.0%: CPT | Mod: CPTII,S$GLB,, | Performed by: INTERNAL MEDICINE

## 2022-12-20 PROCEDURE — 3008F BODY MASS INDEX DOCD: CPT | Mod: CPTII,S$GLB,, | Performed by: INTERNAL MEDICINE

## 2022-12-20 PROCEDURE — 1160F PR REVIEW ALL MEDS BY PRESCRIBER/CLIN PHARMACIST DOCUMENTED: ICD-10-PCS | Mod: CPTII,S$GLB,, | Performed by: INTERNAL MEDICINE

## 2022-12-20 PROCEDURE — 1159F MED LIST DOCD IN RCRD: CPT | Mod: CPTII,S$GLB,, | Performed by: INTERNAL MEDICINE

## 2022-12-20 PROCEDURE — 3074F SYST BP LT 130 MM HG: CPT | Mod: CPTII,S$GLB,, | Performed by: INTERNAL MEDICINE

## 2022-12-20 PROCEDURE — 3078F DIAST BP <80 MM HG: CPT | Mod: CPTII,S$GLB,, | Performed by: INTERNAL MEDICINE

## 2022-12-20 PROCEDURE — 3044F PR MOST RECENT HEMOGLOBIN A1C LEVEL <7.0%: ICD-10-PCS | Mod: CPTII,S$GLB,, | Performed by: INTERNAL MEDICINE

## 2022-12-20 PROCEDURE — 99214 OFFICE O/P EST MOD 30 MIN: CPT | Mod: S$GLB,,, | Performed by: INTERNAL MEDICINE

## 2022-12-20 PROCEDURE — 99214 PR OFFICE/OUTPT VISIT, EST, LEVL IV, 30-39 MIN: ICD-10-PCS | Mod: S$GLB,,, | Performed by: INTERNAL MEDICINE

## 2022-12-20 PROCEDURE — 99999 PR PBB SHADOW E&M-EST. PATIENT-LVL III: ICD-10-PCS | Mod: PBBFAC,,, | Performed by: INTERNAL MEDICINE

## 2022-12-20 PROCEDURE — 1160F RVW MEDS BY RX/DR IN RCRD: CPT | Mod: CPTII,S$GLB,, | Performed by: INTERNAL MEDICINE

## 2022-12-20 NOTE — PROGRESS NOTES
Subjective:       Patient ID: Corrie Calix is a 57 y.o. female.    Chief Complaint: No chief complaint on file.    56 year old who is currently on inhaled preet, saline nebulization and uses acapella and percussion vest therapy. She has improved tremendously on this medical regimen. Her percussion vest is medically necessary to the continued improvement. She was having a hard time completing her ADLs without the vest.      Will continue to follow patient regularly. She is very functional at this time with minimal limitations when on her complete treatment regimen.   PFTs, Imaging if condition changes.   Given improvement in symptoms would not refer to Lung Transplant at this time.       Interval hx: Patient is much improved. She continues to use her vest.     Asthma  There is no cough, shortness of breath or sputum production. Her past medical history is significant for asthma.   Review of Systems   Respiratory:  Negative for cough, sputum production, shortness of breath and dyspnea on extertion.      Objective:      Physical Exam   Constitutional: She is oriented to person, place, and time. She appears well-developed and well-nourished. No distress.   HENT:   Nose: Nose normal.   Cardiovascular: Normal rate and regular rhythm.   Pulmonary/Chest: Normal expansion and symmetric chest wall expansion. She has no rhonchi. She has no rales.   Abdominal: Soft. Bowel sounds are normal.   Musculoskeletal:         General: No edema.   Neurological: She is alert and oriented to person, place, and time.   Skin: Skin is warm and dry. She is not diaphoretic.   Psychiatric: She has a normal mood and affect.   Nursing note and vitals reviewed.  Personal Diagnostic Review  none pertinent  Pulmonary Function Tests 12/20/2022   SpO2 97   Height 67   Weight 1728   BMI (Calculated) 16.9   Some recent data might be hidden         Assessment:       1. Bronchiectasis without complication    2. Adult bronchiectasis        Outpatient  Encounter Medications as of 12/20/2022   Medication Sig Dispense Refill    albuterol (PROVENTIL) 2.5 mg /3 mL (0.083 %) nebulizer solution FOR USE WITH NEBULIZER 3 TIMES A DAY AS NEEDED      cetirizine (ZYRTEC) 10 MG tablet Take 10 mg by mouth once daily.      dupilumab (DUPIXENT PEN) 300 mg/2 mL PnIj Inject 300 mg into the skin every 14 (fourteen) days.      gabapentin (NEURONTIN) 100 MG capsule Take 1 capsule (100 mg total) by mouth 3 (three) times daily. (Patient taking differently: Take 300 mg by mouth. At night) 90 capsule 11    montelukast (SINGULAIR) 10 mg tablet Take 10 mg by mouth nightly.      COMP-AIR NEBULIZER COMPRESSOR Yoselyn use as directed      diazePAM (VALIUM) 5 MG tablet Take 1 tablet (5 mg total) by mouth every 6 (six) hours as needed for Anxiety (For anxiety regarding MRI take 1 hour prior to imaging). 2 tablet 0    EPINEPHrine (EPIPEN JR) 0.15 mg/0.3 mL pen injection Inject 0.15 mg into the muscle.      immun glob G,IgG,-gly-IgA ov50 (CUVITRU) 8 gram/40 mL (20 %) Soln 15 gram      naproxen (NAPROSYN) 500 MG tablet Take 1 tablet (500 mg total) by mouth 2 (two) times daily with meals. (Patient not taking: Reported on 8/18/2022) 28 tablet 0    sodium chloride 3% 3 % nebulizer solution Take 4 mLs by nebulization 2 (two) times a day. 240 mL 11    tobramycin, PF, (PIETER) 300 mg/5 mL nebulizer solution Take 5 mLs (300 mg total) by nebulization every 12 (twelve) hours. Take medication for 14 days on, followed by 14 days off (Patient not taking: Reported on 12/20/2022) 140 mL 11     No facility-administered encounter medications on file as of 12/20/2022.     Orders Placed This Encounter   Procedures    CT Chest Without Contrast     Standing Status:   Future     Standing Expiration Date:   12/20/2023     Order Specific Question:   May the Radiologist modify the order per protocol to meet the clinical needs of the patient?     Answer:   Yes    Complete PFT with bronchodilator     Standing Status:   Future      Standing Expiration Date:   12/20/2023     Order Specific Question:   Release to patient     Answer:   Immediate       Plan:     Adult bronchiectasis  Patient with bronchiectasis who benefits from [employer]. She needs to continue to use the vest for airway clearance.     Asthma  PFT    Follow up in 6 months.     Stevie Hernadez MD

## 2022-12-20 NOTE — ASSESSMENT & PLAN NOTE
Patient with bronchiectasis who benefits from [employer]lov[employer]. She needs to continue to use the vest for airway clearance.

## 2023-01-04 ENCOUNTER — TELEPHONE (OUTPATIENT)
Dept: ORTHOPEDICS | Facility: CLINIC | Age: 58
End: 2023-01-04
Payer: COMMERCIAL

## 2023-01-06 ENCOUNTER — PATIENT MESSAGE (OUTPATIENT)
Dept: ADMINISTRATIVE | Facility: HOSPITAL | Age: 58
End: 2023-01-06
Payer: COMMERCIAL

## 2023-01-06 ENCOUNTER — PATIENT OUTREACH (OUTPATIENT)
Dept: ADMINISTRATIVE | Facility: HOSPITAL | Age: 58
End: 2023-01-06
Payer: COMMERCIAL

## 2023-01-06 NOTE — PROGRESS NOTES
Health Maintenance Due   Topic Date Due    Lipid Panel  Never done    Shingles Vaccine (1 of 2) Never done    Colorectal Cancer Screening  Never done    Pneumococcal Vaccines (Age 0-64) (2 - PPSV23 if available, else PCV20) 08/15/2019    Mammogram  11/25/2020        updated.  Triggered LINKS and Care Everywhere. Outreached regarding FOBT.    Sunshine Dia LPN   Clinical Care Coordinator  Primary Care and Wellness

## 2023-01-09 ENCOUNTER — TELEPHONE (OUTPATIENT)
Dept: ORTHOPEDICS | Facility: CLINIC | Age: 58
End: 2023-01-09
Payer: COMMERCIAL

## 2023-01-09 NOTE — TELEPHONE ENCOUNTER
Spoke c pt. Informed pt that Dr. Houston will no longer be seeing pts on 01/10/23  New appt  date 01/13/23 with Arcelia Ndiaye for injection. Confirmed appt location & time. Pt expressed understanding & was thankful.

## 2023-01-12 NOTE — PROGRESS NOTES
Subjective:      Patient ID: Corrie Calix is a 57 y.o. female.    Chief Complaint: Pain of the Left Shoulder      HPI  Corrie Calix is a right hand dominant 57 y.o. female, with immune deficiency, presenting today for follow up of bilateral carpal tunnel syndrome and left shoulder pain.  She underwent left shoulder injection at her last visit (8/2022) and attended OT. Reports improvement in pain and motion in the left shoulder, as well as improvement in bilateral hand numbness. She reports that she had COVID 10/31/2022 and her shoulder pain has started increasing since that time. She continues to perform home exercises, reports intermittent pain that radiates down the arms. She canceled her planned carpal tunnel release in October. Finger numbness and bilateral upper and lower extremity pain began 3/2022.   States she woke up one morning 3/2022 and had inflammation and pain in multiple joints bilateral upper and lower extremities including BL shoulders (L>R), BL hands (mostly MP joints) that is worse in the morning. Has been seeing rheumatology and her PCP for workup and management. EMG of BUE was ordered and showed BL severe carpal tunnel syndrome so she was referred to Dr. Piper for surgical evaluation.  She does take gabapentin 300 mg nightly.  At her last visit with Dr. Piper bilateral carpal tunnel releases were recommended and steroid injection and therapy for suspected left frozen shoulder      Review of patient's allergies indicates:   Allergen Reactions    Jethro-dur Other (See Comments)    Theophylline Nausea And Vomiting and Other (See Comments)         Current Outpatient Medications   Medication Sig Dispense Refill    albuterol (PROVENTIL) 2.5 mg /3 mL (0.083 %) nebulizer solution FOR USE WITH NEBULIZER 3 TIMES A DAY AS NEEDED      cetirizine (ZYRTEC) 10 MG tablet Take 10 mg by mouth once daily.      COMP-AIR NEBULIZER COMPRESSOR Yoselyn use as directed      diazePAM (VALIUM) 5 MG tablet Take  "1 tablet (5 mg total) by mouth every 6 (six) hours as needed for Anxiety (For anxiety regarding MRI take 1 hour prior to imaging). 2 tablet 0    dupilumab (DUPIXENT PEN) 300 mg/2 mL PnIj Inject 300 mg into the skin every 14 (fourteen) days.      EPINEPHrine (EPIPEN JR) 0.15 mg/0.3 mL pen injection Inject 0.15 mg into the muscle.      gabapentin (NEURONTIN) 100 MG capsule Take 1 capsule (100 mg total) by mouth 3 (three) times daily. (Patient taking differently: Take 300 mg by mouth. At night) 90 capsule 11    immun glob G,IgG,-gly-IgA ov50 (CUVITRU) 8 gram/40 mL (20 %) Soln 15 gram      montelukast (SINGULAIR) 10 mg tablet Take 10 mg by mouth nightly.      naproxen (NAPROSYN) 500 MG tablet Take 1 tablet (500 mg total) by mouth 2 (two) times daily with meals. (Patient not taking: Reported on 8/18/2022) 28 tablet 0    sodium chloride 3% 3 % nebulizer solution Take 4 mLs by nebulization 2 (two) times a day. 240 mL 11    tobramycin, PF, (PIETER) 300 mg/5 mL nebulizer solution Take 5 mLs (300 mg total) by nebulization every 12 (twelve) hours. Take medication for 14 days on, followed by 14 days off (Patient not taking: Reported on 12/20/2022) 140 mL 11     No current facility-administered medications for this visit.       Past Medical History:   Diagnosis Date    Allergy     Anti-polysaccharide antibody deficiency     Asthma     Pleurisy     Pneumothorax        Past Surgical History:   Procedure Laterality Date    BRONCHOSCOPY N/A 6/29/2021    Procedure: BRONCHOSCOPY;  Surgeon: [facility] Diagnostic Provider;  Location: [facility] OR [address] [place] [address];  Service: Anesthesiology;  Laterality: N/A;    EYE SURGERY      LUNG SURGERY           Review of Systems:  ROS:  Constitutional: no fever or chills  Skin: no rash or suspicious lesions  Musculoskeletal: See HPI.   Neurological: no headaches, lightheadedness, or dizziness. No numbness or tingling  Psychological/behavioral: no anxiety or depression      OBJECTIVE:     PHYSICAL EXAM:  Height: 5' 7" " "(170.2 cm) Weight: 49 kg (108 lb)  Vitals:    01/13/23 1006   BP: 99/65   Pulse: 79   Weight: 49 kg (108 lb)   Height: 5' 7" (1.702 m)   PainSc:   4     Vitals reviewed.  Constitutional: NAD. Patient appears well-developed and well-nourished.   HENT:   Head: Normocephalic and atraumatic.   Neck: Normal range of motion.   Cardiovascular: Normal rate.    Pulmonary/Chest: Effort normal. No respiratory distress.   Neurological: Patient is awake, alert, oriented.   Psychiatric: Patient has a normal mood and affect. Behavior is normal. Judgment and thought content normal.  Musculoskeletal:  Tattoos bilaterally.  No scars noted, no lacerations or abrasions.  No edema.  No significant tenderness to palpation.  Good finger, wrist, and elbow range of motion bilaterally.  Good shoulder range of motion bilaterally, right shoulder motion slightly limited compared to left.  She has approximately 160° of forward flexion and abduction, internal rotation to the level of T12, good adduction and external rotation.  Negative impingement sign bilaterally.  Neurovascularly intact-she reports equal sensation in the median, ulnar, and radial distributions.  Good motor function bilaterally.  Good capillary refill, 2+ radial pulses.  Negative Tinel's bilaterally over the carpal tunnels.  Positive Durkan's bilaterally.    RADIOGRAPHS:  Left shoulder XRay, 6/8/2022  FINDINGS:  The osseous structures appear intact without evidence of a displaced fracture or osseous destructive lesion.  No evidence of dislocation.  No advanced degenerative change.  No calcifications to suggest tendonitis.  Impression:  No evidence of fracture or dislocation.    Bilateral Hand XRay, 6/8/2022  FINDINGS:  Normal mineralization.  No fracture or dislocation.  Joint spaces are preserved.  No erosions or bony proliferative changes.  There is symmetric juxta-articular soft tissue swelling, for example in middle finger PIP joints bilaterally, nonspecific but could " represent joint inflammation.  No soft tissue calcifications.  Impression:  As above.    Comments: I have personally reviewed the imaging and I agree with the above radiologist's report.    EMG, 7/2022  Impression   This is an abnormal study. There is electrophysiologic evidence of:   1. Severe bilateral carpal tunnel syndrome (median neuropathy at the wrist) with secondary denervation in both APB muscles,   2. Chronic denervation in the right C7 and C8 myotomes suggestive of radiculopathy at those levels.     ASSESSMENT/PLAN:   Corrie was seen today for pain.    Diagnoses and all orders for this visit:    Chronic left shoulder pain  -     Ambulatory referral/consult to Physical/Occupational Therapy; Future    Bilateral carpal tunnel syndrome    Other orders  -     triamcinolone acetonide injection 80 mg  -     BUPivacaine 0.25% (2.5 mg/ml) injection 10 mg        - discussed patient's symptoms and physical exam findings, discussed her prior EMG.  She is requesting a repeat steroid injection for the left shoulder today, she is concerned about the left shoulder pain and limitations increasing.   - she will continue performing home exercises, we discussed restarting therapy.  Therapy orders placed  - left shoulder steroid injection today  - we discussed the carpal tunnel syndrome seen on EMG, discussed her carpal tunnel surgery that was scheduled but canceled.  Patient has carpal tunnel syndrome is not her primary concern.  We discussed she is taking gabapentin, this can occasionally mask some of the carpal tunnel symptoms.  We discussed that she is positive for carpal tunnel syndrome based on exam today and that her EMG showed severe carpal tunnel syndrome.  She will restart use of carpal tunnel braces, she will consider rescheduling carpal tunnel surgery  - follow-up in 8 weeks for recheck  - call with questions or concerns    PROCEDURE NOTE:  I have explained the risks, benefits, and alternatives of the procedure in  detail.  The patient voices understanding and all questions have been answered.  The patient agrees to proceed as planned, consents to injection. Pause for timeout. After a sterile prep of the skin performed in the normal fashion, the left shoulder is injected from the posterior approach using a 22 gauge needle with a combination of 4 cc 0.25% marcaine and 80 mg of kenalog. The patient is cautioned and immediate relief of pain is secondary to the local anesthetic and will be temporary.  After the anesthetic wears off there may be a increase in pain that may last for a few hours or a few days and they should use ice to help alleviate this flair up of pain.       Disclaimer: This note has been generated using voice-recognition software. There may be typographical errors that have been missed during proof-reading.

## 2023-01-13 ENCOUNTER — OFFICE VISIT (OUTPATIENT)
Dept: ORTHOPEDICS | Facility: CLINIC | Age: 58
End: 2023-01-13
Payer: COMMERCIAL

## 2023-01-13 VITALS
DIASTOLIC BLOOD PRESSURE: 65 MMHG | HEIGHT: 67 IN | BODY MASS INDEX: 16.95 KG/M2 | WEIGHT: 108 LBS | HEART RATE: 79 BPM | SYSTOLIC BLOOD PRESSURE: 99 MMHG

## 2023-01-13 DIAGNOSIS — M25.512 CHRONIC LEFT SHOULDER PAIN: Primary | ICD-10-CM

## 2023-01-13 DIAGNOSIS — G56.03 BILATERAL CARPAL TUNNEL SYNDROME: ICD-10-CM

## 2023-01-13 DIAGNOSIS — G89.29 CHRONIC LEFT SHOULDER PAIN: Primary | ICD-10-CM

## 2023-01-13 PROCEDURE — 1160F RVW MEDS BY RX/DR IN RCRD: CPT | Mod: CPTII,S$GLB,, | Performed by: PHYSICIAN ASSISTANT

## 2023-01-13 PROCEDURE — 1159F PR MEDICATION LIST DOCUMENTED IN MEDICAL RECORD: ICD-10-PCS | Mod: CPTII,S$GLB,, | Performed by: PHYSICIAN ASSISTANT

## 2023-01-13 PROCEDURE — 1160F PR REVIEW ALL MEDS BY PRESCRIBER/CLIN PHARMACIST DOCUMENTED: ICD-10-PCS | Mod: CPTII,S$GLB,, | Performed by: PHYSICIAN ASSISTANT

## 2023-01-13 PROCEDURE — 3078F PR MOST RECENT DIASTOLIC BLOOD PRESSURE < 80 MM HG: ICD-10-PCS | Mod: CPTII,S$GLB,, | Performed by: PHYSICIAN ASSISTANT

## 2023-01-13 PROCEDURE — 3074F SYST BP LT 130 MM HG: CPT | Mod: CPTII,S$GLB,, | Performed by: PHYSICIAN ASSISTANT

## 2023-01-13 PROCEDURE — 1159F MED LIST DOCD IN RCRD: CPT | Mod: CPTII,S$GLB,, | Performed by: PHYSICIAN ASSISTANT

## 2023-01-13 PROCEDURE — 20610 PR DRAIN/INJECT LARGE JOINT/BURSA: ICD-10-PCS | Mod: LT,S$GLB,, | Performed by: PHYSICIAN ASSISTANT

## 2023-01-13 PROCEDURE — 3008F BODY MASS INDEX DOCD: CPT | Mod: CPTII,S$GLB,, | Performed by: PHYSICIAN ASSISTANT

## 2023-01-13 PROCEDURE — 3074F PR MOST RECENT SYSTOLIC BLOOD PRESSURE < 130 MM HG: ICD-10-PCS | Mod: CPTII,S$GLB,, | Performed by: PHYSICIAN ASSISTANT

## 2023-01-13 PROCEDURE — 99999 PR PBB SHADOW E&M-EST. PATIENT-LVL IV: CPT | Mod: PBBFAC,,, | Performed by: PHYSICIAN ASSISTANT

## 2023-01-13 PROCEDURE — 3078F DIAST BP <80 MM HG: CPT | Mod: CPTII,S$GLB,, | Performed by: PHYSICIAN ASSISTANT

## 2023-01-13 PROCEDURE — 99214 PR OFFICE/OUTPT VISIT, EST, LEVL IV, 30-39 MIN: ICD-10-PCS | Mod: 25,S$GLB,, | Performed by: PHYSICIAN ASSISTANT

## 2023-01-13 PROCEDURE — 99999 PR PBB SHADOW E&M-EST. PATIENT-LVL IV: ICD-10-PCS | Mod: PBBFAC,,, | Performed by: PHYSICIAN ASSISTANT

## 2023-01-13 PROCEDURE — 20610 DRAIN/INJ JOINT/BURSA W/O US: CPT | Mod: LT,S$GLB,, | Performed by: PHYSICIAN ASSISTANT

## 2023-01-13 PROCEDURE — 3008F PR BODY MASS INDEX (BMI) DOCUMENTED: ICD-10-PCS | Mod: CPTII,S$GLB,, | Performed by: PHYSICIAN ASSISTANT

## 2023-01-13 PROCEDURE — 99214 OFFICE O/P EST MOD 30 MIN: CPT | Mod: 25,S$GLB,, | Performed by: PHYSICIAN ASSISTANT

## 2023-01-13 RX ORDER — BUPIVACAINE HYDROCHLORIDE 2.5 MG/ML
4 INJECTION, SOLUTION INFILTRATION; PERINEURAL
Status: COMPLETED | OUTPATIENT
Start: 2023-01-13 | End: 2023-01-13

## 2023-01-13 RX ORDER — TRIAMCINOLONE ACETONIDE 40 MG/ML
80 INJECTION, SUSPENSION INTRA-ARTICULAR; INTRAMUSCULAR
Status: COMPLETED | OUTPATIENT
Start: 2023-01-13 | End: 2023-01-13

## 2023-01-13 RX ADMIN — TRIAMCINOLONE ACETONIDE 80 MG: 40 INJECTION, SUSPENSION INTRA-ARTICULAR; INTRAMUSCULAR at 10:01

## 2023-01-13 RX ADMIN — BUPIVACAINE HYDROCHLORIDE 10 MG: 2.5 INJECTION, SOLUTION INFILTRATION; PERINEURAL at 10:01

## 2023-02-06 ENCOUNTER — CLINICAL SUPPORT (OUTPATIENT)
Dept: REHABILITATION | Facility: HOSPITAL | Age: 58
End: 2023-02-06
Attending: PHYSICIAN ASSISTANT
Payer: COMMERCIAL

## 2023-02-06 DIAGNOSIS — M25.512 CHRONIC LEFT SHOULDER PAIN: ICD-10-CM

## 2023-02-06 DIAGNOSIS — G89.29 CHRONIC LEFT SHOULDER PAIN: ICD-10-CM

## 2023-02-06 PROCEDURE — 97110 THERAPEUTIC EXERCISES: CPT | Mod: PO

## 2023-02-06 PROCEDURE — 97010 HOT OR COLD PACKS THERAPY: CPT | Mod: PO

## 2023-02-06 PROCEDURE — 97166 OT EVAL MOD COMPLEX 45 MIN: CPT | Mod: PO

## 2023-02-06 NOTE — PLAN OF CARE
[facility] Occupational Therapy  Initial Evaluation     Date: 2/6/2023  Patient: Corrie Calix  Chart Number: [id number]  Referring Physician: Arcelia Ndiaye PA  Therapy Diagnosis:   1. Chronic left shoulder pain  Ambulatory referral/consult to Physical/Occupational Therapy          Medical Diagnosis: M25.512,G89.29 (ICD-10-CM) - Chronic left shoulder pain  Physician Orders: Shoulder pain with ROM, restart therapy  Eval and Treat, modalities as needed  8+ visits  Evaluation Date: 2/6/2023  Plan of Care Certification Date: 5/6/2023  Authorization Period: 12/31/2023  Surgery Date and Procedure: N/A  Date of Return to MD: 3/14/2023    Visit #: 1 of 1  Time In: 3:30 PM  Time Out: 4:15 PM  Total Billable Time: 45 min    Precautions: Standard    Subjective     Involved Side: Left  Dominant Side: Right  Date of Onset: 3/2022  History of Current Condition: Pt w/ onset of multiple inflamed sites throughout her body developed left adhesive capsulitis. She reports that she had COVID 10/31/2022 and her shoulder pain has started increasing since that time. She continues to perform home exercises, reports intermittent pain that radiates down the arms.   Imaging: None  Previous Therapy: 7 visits OT w/ significant improvement    Patient's Goals for Therapy: Return to premorbid level of function    Pain:  Functional Pain Scale Rating 0-10:   0/10 on average  0/10 at best  6/10 at worst  Location: Left shoulder  Description: Tight  Aggravating Factors: Motion   Easing Factors: Rest    Previous Level of function Independent w/ ADL's, work    Current Level of Function Per above but w/ pain    Occupation:  Special ed   Working presently: employed  Duties: Mostly at the desk, some direct interaction w/ children    Past Medical History/Physical Systems Review:   Corrie Calix  has a past medical history of Allergy, Anti-polysaccharide antibody deficiency, Asthma, Pleurisy, and Pneumothorax.    Corrie  MIKY Calix  has a past surgical history that includes Lung surgery; Eye surgery; and Bronchoscopy (N/A, 2021).    Corrie has a current medication list which includes the following prescription(s): albuterol, cetirizine, comp-air nebulizer compressor, diazepam, dupixent pen, epinephrine, gabapentin, cuvitru, montelukast, naproxen, sodium chloride 3%, and tobramycin (pf).    Review of patient's allergies indicates:   Allergen Reactions    Jethro-dur Other (See Comments)    Theophylline Nausea And Vomiting and Other (See Comments)          Objective     Mental status: alert    Observation:   Unremarkable      Sensation:   WNL    Range of Motion:   Left    Shoulder AROM  SF  SE  SA  ER  IR    144 42 149 84 26        Manual Muscle Testin/5 all planes     Strength: (SWATI Dynamometer in psi.)      Left Right   Rung II 44 48       CMS Impairment/Limitation/Restriction for FOTO Hand/Wrist/Finger Survey    FOTO documents entered into [employer] - see Media section.    Limitation Score: 41%  Category: Other           Treatment     Treatment Time In: 3:50 PM  Treatment Time Out: 4:15 PM  Total Treatment time separate from Evaluation time: 25 min    Corrie received the following supervised modalities after being cleared for contraindications for 10 minutes in order to promote increased blood flow and tissue elasticity:   - left shoulder    Corrie performed therapeutic exercises for 15 minutes including:  -Previous HEP review  -IR and therapy ball stretches    Home Exercise Program/Education:  Issued HEP (see patient instructions in EMR) and educated on modality use for pain management . Exercises were reviewed and Corrie was able to demonstrate them prior to the end of the session.   Pt received a written copy of exercises to perform at home. Corrie demonstrated good  understanding of the education provided.  Pt was advised to perform these exercises free of pain, and to stop performing them if pain  occurs.    Patient/Family Education: role of OT, goals for OT, scheduling/cancellations - pt verbalized understanding. Discussed insurance limitations with patient.      Assessment     Corrie Calix is a 57 y.o. female presents with limitations as described in problem list. Patient can benefit from Occupational Therapy services for Iontophoresis, ultrasound, moist heat, therapeutic exercises, home exercise program provied with written instructions, ice and strengthening and orthotics, if deemed necessary . The following goals were discussed with the patient and she is in agreement with them as to be addressed in the treatment plan.    The patient's rehab potential is Good.     Anticipated barriers to occupational therapy: None  Pt has no cultural, educational or language barriers to learning provided.    Profile and History Assessment of Occupational Performance Level of Clinical Decision Making Complexity Score   Occupational Profile:   Corrie Calix is a 57 y.o. female who is currently employed Correi Calix has difficulty with  ADLs and IADLs as listed previously, which  affecting his/her daily functional abilities.      Comorbidities:    has a past medical history of Allergy, Anti-polysaccharide antibody deficiency, Asthma, Pleurisy, and Pneumothorax.    Medical and Therapy History Review:   Expanded               Performance Deficits    Physical:  Joint Mobility  Pain    Cognitive:  No Deficits    Psychosocial:    No Deficits     Clinical Decision Making:  moderate    Modification/Need for Assistance:  Not Necessary    Intervention Selection:  Several Treatment Options       moderate  Based on PMHX, co morbidities , data from assessments and functional level of assistance required with task and clinical presentation directly impacting function.         Goals:    LTG's (10 weeks):  1)   Increase ROM 20 degrees in left shoulder abduction/flexion to increase functional hand use for overhead reaching.  2)    Decrease complaints of pain to  2 out of 10 at worst to increase functional hand use for ADL/work/leisure activities.  3)   Patient to score at 25% or less on FOTO to demonstrate improved perception of functional LUE use.  4)   Pt will return to near to prior level of function for ADLs and household management reporting I or Mod I with ADLs (dressing, feeding, grooming, toileting).     STG's (5 weeks)  1)   Patient to be IND with HEP and modalities for pain/edema managment.  2)   Increase ROM 10 degrees in left shoulder abduction/flexion to increase functional hand use for overhead reaching.  3)   Decrease complaints of pain to  3 out of 10 at worst to increase functional hand use for ADL/work/leisure activities.          Plan     Pt to be treated by Occupational Therapy 1-2 times per week for 10 weeks during the certification period from 2/6/2023 to 5/6/2023 to achieve the established goals.     Treatment to include: Manual therapy/joint mobilizations, Modalities for pain management, and Therapeutic exercises/activities., as well as any other treatments deemed necessary based on the patient's needs or progress.     GARRETT Freeman  OTR/L, CHT  Occupational therapist, Certified Hand Therapist

## 2023-02-20 NOTE — PROGRESS NOTES
"  Occupational Therapy Daily Treatment Note     Date: 2/22/2023  Name: Corrie Calix  Clinic Number: [id number]    TTherapy Diagnosis:   1. Chronic left shoulder pain  Ambulatory referral/consult to Physical/Occupational Therapy             Medical Diagnosis: M25.512,G89.29 (ICD-10-CM) - Chronic left shoulder pain  Physician Orders: Shoulder pain with ROM, restart therapy  Eval and Treat, modalities as needed  8+ visits  Evaluation Date: 2/6/2023  Plan of Care Certification Date: 5/6/2023  Authorization Period: 12/31/2023  Surgery Date and Procedure: N/A  Date of Return to MD: 3/14/2023     Visit #: 1 of 10  Time In: 3:45 PM  Time Out: 4:30 PM  Total Billable Time: 45 min     Precautions: Standard      Subjective     Pt reports: a different kind of feeling in the shoulder and does not consider it as "pain"  Response to previous treatment:Jimmie well    Pain: 0/10  Objective     Corrie received the following supervised modalities after being cleared for contraindications for 10 minutes in order to promote increased blood flow and tissue elasticity:   -MH to left shoulder    Corrie participated in dynamic functional therapeutic activities to improve functional performance for 35  minutes, including:  - UBE forward/backward 3'  - PROM Sh flex, abd, ext rot x10 each  - Scap retractions w/ Orange band 3x10  - Wall pushups 2x10    Home Exercises and Education Provided     Education provided:   - Progress towards goals     Written Home Exercises Provided: Patient instructed to cont prior HEP.  Exercises were reviewed and Corrie was able to demonstrate them prior to the end of the session.  Corrie demonstrated good  understanding of the HEP provided.   .   See EMR under Patient Instructions for exercises provided prior visit.        Assessment     Pt would continue to benefit from skilled OT to maximize LUE function     Corrie is progressing towards her goals and there are no updates to goals at this time. Pt prognosis is Good. "     Pt will continue to benefit from skilled outpatient occupational therapy to address the deficits listed in the problem list on initial evaluation provide pt/family education and to maximize pt's level of independence in the home and community environment.     Anticipated barriers to therapy: Co morbidities    Pt's spiritual, cultural and educational needs considered and pt agreeable to plan of care and goals.       Goals:    LTG's (10 weeks):  1)   Increase ROM 20 degrees in left shoulder abduction/flexion to increase functional hand use for overhead reaching.  2)   Decrease complaints of pain to  2 out of 10 at worst to increase functional hand use for ADL/work/leisure activities.  3)   Patient to score at 25% or less on FOTO to demonstrate improved perception of functional LUE use.  4)   Pt will return to near to prior level of function for ADLs and household management reporting I or Mod I with ADLs (dressing, feeding, grooming, toileting).      STG's (5 weeks)  1)   Patient to be IND with HEP and modalities for pain/edema managment.  2)   Increase ROM 10 degrees in left shoulder abduction/flexion to increase functional hand use for overhead reaching.  3)   Decrease complaints of pain to  3 out of 10 at worst to increase functional hand use for ADL/work/leisure activities.  Plan   Cont OT to address above goals.      SHERIE Alegria.   Clinician     I certify that the above therapy session was witnessed by me and I agree with the information in the associated documentation.  GARRETT Freeman OTR/L, CHT

## 2023-02-22 ENCOUNTER — CLINICAL SUPPORT (OUTPATIENT)
Dept: REHABILITATION | Facility: HOSPITAL | Age: 58
End: 2023-02-22
Attending: INTERNAL MEDICINE
Payer: COMMERCIAL

## 2023-02-22 DIAGNOSIS — G89.29 CHRONIC LEFT SHOULDER PAIN: Primary | ICD-10-CM

## 2023-02-22 DIAGNOSIS — M25.512 CHRONIC LEFT SHOULDER PAIN: Primary | ICD-10-CM

## 2023-02-22 PROCEDURE — 97530 THERAPEUTIC ACTIVITIES: CPT | Mod: PO

## 2023-02-22 PROCEDURE — 97010 HOT OR COLD PACKS THERAPY: CPT | Mod: PO

## 2023-03-07 NOTE — PROGRESS NOTES
"  Occupational Therapy Daily Treatment Note     Date: 3/8/2023  Name: Corrie Calix  Clinic Number: [id number]    TTherapy Diagnosis:   1. Chronic left shoulder pain  Ambulatory referral/consult to Physical/Occupational Therapy             Medical Diagnosis: M25.512,G89.29 (ICD-10-CM) - Chronic left shoulder pain  Physician Orders: Shoulder pain with ROM, restart therapy  Eval and Treat, modalities as needed  8+ visits  Evaluation Date: 2/6/2023  Plan of Care Certification Date: 5/6/2023  Authorization Period: 12/31/2023  Surgery Date and Procedure: N/A  Date of Return to MD: 3/14/2023     Visit #: 2 of 10  Time In: 4:30 PM  Time Out: 5:20 PM  Total Billable Time: 50 min     Precautions: Standard      Subjective     Pt reports: a different kind of feeling in the shoulder and does not consider it as "pain"  Response to previous treatment:Jimmie well    Pain: 0/10  Objective     Corrie received the following supervised modalities after being cleared for contraindications for 10 minutes in order to promote increased blood flow and tissue elasticity:   -MH to left shoulder    Corrie participated in dynamic functional therapeutic activities to improve functional performance for 35  minutes, including:  - UBE forward/backward 3'  - PROM Sh flex, abd, ext rot x10 each  - Scap retractions w/ Orange band 3x10  - Wall pushups 2x10    Home Exercises and Education Provided     Education provided:   - Work ergonomics   - Progress towards goals     Written Home Exercises Provided: Patient instructed to cont prior HEP.  Exercises were reviewed and Corrie was able to demonstrate them prior to the end of the session.  Corrie demonstrated good  understanding of the HEP provided.   .   See EMR under Patient Instructions for exercises provided prior visit.        Assessment     Pt would continue to benefit from skilled OT to maximize LUE function     Corrie is progressing towards her goals and there are no updates to goals at this time. Pt " prognosis is Good.     Pt will continue to benefit from skilled outpatient occupational therapy to address the deficits listed in the problem list on initial evaluation provide pt/family education and to maximize pt's level of independence in the home and community environment.     Anticipated barriers to therapy: Co morbidities    Pt's spiritual, cultural and educational needs considered and pt agreeable to plan of care and goals.       Goals:    LTG's (10 weeks):  1)   Increase ROM 20 degrees in left shoulder abduction/flexion to increase functional hand use for overhead reaching. Progressing  2)   Decrease complaints of pain to  2 out of 10 at worst to increase functional hand use for ADL/work/leisure activities. Progressing  3)   Patient to score at 25% or less on FOTO to demonstrate improved perception of functional LUE use. Progressing  4)   Pt will return to near to prior level of function for ADLs and household management reporting I or Mod I with ADLs (dressing, feeding, grooming, toileting). Progressing     STG's (5 weeks)  1)   Patient to be IND with HEP and modalities for pain/edema managment. Progressing  2)   Increase ROM 10 degrees in left shoulder abduction/flexion to increase functional hand use for overhead reaching. Progressing  3)   Decrease complaints of pain to  3 out of 10 at worst to increase functional hand use for ADL/work/leisure activities. Progressing  Plan   Cont OT to address above goals.      SHERIE Alegria.   Clinician     I certify that the above therapy session was witnessed by me and I agree with the information in the associated documentation.  GARRETT Freeman OTR/L, CHT

## 2023-03-08 ENCOUNTER — CLINICAL SUPPORT (OUTPATIENT)
Dept: REHABILITATION | Facility: HOSPITAL | Age: 58
End: 2023-03-08
Attending: PHYSICIAN ASSISTANT
Payer: COMMERCIAL

## 2023-03-08 DIAGNOSIS — G89.29 CHRONIC LEFT SHOULDER PAIN: Primary | ICD-10-CM

## 2023-03-08 DIAGNOSIS — M25.512 CHRONIC LEFT SHOULDER PAIN: Primary | ICD-10-CM

## 2023-03-08 PROCEDURE — 97010 HOT OR COLD PACKS THERAPY: CPT | Mod: PO

## 2023-03-08 PROCEDURE — 97530 THERAPEUTIC ACTIVITIES: CPT | Mod: PO

## 2023-03-09 ENCOUNTER — TELEPHONE (OUTPATIENT)
Dept: ORTHOPEDICS | Facility: CLINIC | Age: 58
End: 2023-03-09
Payer: COMMERCIAL

## 2023-03-28 NOTE — PROGRESS NOTES
"  Occupational Therapy Daily Treatment Note     Date: 3/29/2023  Name: Corrie Calix  Clinic Number: [id number]    TTherapy Diagnosis:   1. Chronic left shoulder pain  Ambulatory referral/consult to Physical/Occupational Therapy             Medical Diagnosis: M25.512,G89.29 (ICD-10-CM) - Chronic left shoulder pain  Physician Orders: Shoulder pain with ROM, restart therapy  Eval and Treat, modalities as needed  8+ visits  Evaluation Date: 2/6/2023  Plan of Care Certification Date: 5/6/2023  Authorization Period: 12/31/2023  Surgery Date and Procedure: N/A  Date of Return to MD: 3/14/2023     Visit #: 3 of 10  Time In: 4:30 PM  Time Out: 5:15 PM  Total Billable Time: 45 min     Precautions: Standard      Subjective     Pt reports: she experienced shoulder pain when she went to open up her car door  Response to previous treatment:Jimmie well    Pain: 0/10  Objective     Corrie received the following supervised modalities after being cleared for contraindications for 10 minutes in order to promote increased blood flow and tissue elasticity:   -MH to left shoulder    Corrie participated in dynamic functional therapeutic activities to improve functional performance for 35  minutes, including:  - UBE forward/backward 3'  - PROM Sh flex, abd, ext rot x10 each  - Scap retractions w/ red theraband 3x10  - Shoulder external rotation with blue theraband 10" hold 3x10      Home Exercises and Education Provided     Education provided:   - Work ergonomics   - Progress towards goals     Written Home Exercises Provided: Patient instructed to cont prior HEP.  Exercises were reviewed and Corrie was able to demonstrate them prior to the end of the session.  Corrie demonstrated good  understanding of the HEP provided.   .   See EMR under Patient Instructions for exercises provided prior visit.        Assessment     Pt reports that she sometimes experiences shoulder pain with certain movements such as opening a car door. Pt is unable to rate " pain and describes it as discomfort. Pt tolerated therapy well and is compliant with her HEP. Pt would continue to benefit from skilled OT to decrease shoulder discomfort and maximize LUE function.    Corrie is progressing towards her goals and there are no updates to goals at this time. Pt prognosis is Good.     Pt will continue to benefit from skilled outpatient occupational therapy to address the deficits listed in the problem list on initial evaluation provide pt/family education and to maximize pt's level of independence in the home and community environment.     Anticipated barriers to therapy: Co morbidities    Pt's spiritual, cultural and educational needs considered and pt agreeable to plan of care and goals.       Goals:    LTG's (10 weeks):  1)   Increase ROM 20 degrees in left shoulder abduction/flexion to increase functional hand use for overhead reaching. Progressing  2)   Decrease complaints of pain to 2 out of 10 at worst to increase functional hand use for ADL/work/leisure activities. Progressing  3)   Patient to score at 25% or less on FOTO to demonstrate improved perception of functional LUE use. Progressing  4)   Pt will return to near to prior level of function for ADLs and household management reporting I or Mod I with ADLs (dressing, feeding, grooming, toileting). Progressing     STG's (5 weeks)  1)   Patient to be IND with HEP and modalities for pain/edema managment. Progressing  2)   Increase ROM 10 degrees in left shoulder abduction/flexion to increase functional hand use for overhead reaching. Progressing  3)   Decrease complaints of pain to  3 out of 10 at worst to increase functional hand use for ADL/work/leisure activities. Progressing  Plan   Cont OT to address above goals.      SOLEDAD Alegria.S.   Clinician     I certify that the above therapy session was witnessed by me and I agree with the information in the associated documentation.  GARRETT Freeman OTR/L, CHT

## 2023-03-29 ENCOUNTER — CLINICAL SUPPORT (OUTPATIENT)
Dept: REHABILITATION | Facility: HOSPITAL | Age: 58
End: 2023-03-29
Attending: INTERNAL MEDICINE
Payer: COMMERCIAL

## 2023-03-29 DIAGNOSIS — G89.29 CHRONIC LEFT SHOULDER PAIN: Primary | ICD-10-CM

## 2023-03-29 DIAGNOSIS — M25.512 CHRONIC LEFT SHOULDER PAIN: Primary | ICD-10-CM

## 2023-03-29 PROCEDURE — 97010 HOT OR COLD PACKS THERAPY: CPT | Mod: PO

## 2023-03-29 PROCEDURE — 97530 THERAPEUTIC ACTIVITIES: CPT | Mod: PO

## 2023-04-04 NOTE — PROGRESS NOTES
Occupational Therapy Daily Treatment Note     Date: 4/5/2023  Name: Corrie Calix  Clinic Number: [id number]    TTherapy Diagnosis:   1. Chronic left shoulder pain  Ambulatory referral/consult to Physical/Occupational Therapy             Medical Diagnosis: M25.512,G89.29 (ICD-10-CM) - Chronic left shoulder pain  Physician Orders: Shoulder pain with ROM, restart therapy  Eval and Treat, modalities as needed  8+ visits  Evaluation Date: 2/6/2023  Plan of Care Certification Date: 5/6/2023  Authorization Period: 12/31/2023  Surgery Date and Procedure: N/A  Date of Return to MD: 3/14/2023     Visit #: 3 of 10  Time In: 4:30 PM  Time Out: 5:15 PM  Total Billable Time: 45 min     Precautions: Standard      Subjective     Pt reports: Increased pain today  Response to previous treatment:Jimmie well    Pain: 4/10  Location: Superior/posterior shld  Objective     Corrie received the following supervised modalities after being cleared for contraindications for 10 minutes in order to promote increased blood flow and tissue elasticity:   -MH to left shoulder    Corrie participated in dynamic functional therapeutic activities to improve functional performance for 35  minutes, including:  - UBE forward/backward 3' ea  - PROM/joint mobs left shld    Shoulder AROM  SF  SE  SA  ER  IR    [phone]   +8 +3 +6 -14 +19        Home Exercises and Education Provided     Education provided:   - Work ergonomics   - Progress towards goals     Written Home Exercises Provided: Patient instructed to cont prior HEP.  Exercises were reviewed and Corrie was able to demonstrate them prior to the end of the session.  Corrie demonstrated good  understanding of the HEP provided.   .   See EMR under Patient Instructions for exercises provided prior visit.        Assessment     Pt presents w/ overall increased left shld ROM, although somewhat more painful today.    Corrie is progressing towards her goals and there are no updates to goals at this  time. Pt prognosis is Good.     Pt will continue to benefit from skilled outpatient occupational therapy to address the deficits listed in the problem list on initial evaluation provide pt/family education and to maximize pt's level of independence in the home and community environment.     Anticipated barriers to therapy: Co morbidities    Pt's spiritual, cultural and educational needs considered and pt agreeable to plan of care and goals.       Goals:    LTG's (10 weeks):  1)   Increase ROM 20 degrees in left shoulder abduction/flexion to increase functional hand use for overhead reaching. Progressing  2)   Decrease complaints of pain to 2 out of 10 at worst to increase functional hand use for ADL/work/leisure activities. Progressing  3)   Patient to score at 25% or less on FOTO to demonstrate improved perception of functional LUE use. Progressing  4)   Pt will return to near to prior level of function for ADLs and household management reporting I or Mod I with ADLs (dressing, feeding, grooming, toileting). Progressing     STG's (5 weeks)  1)   Patient to be IND with HEP and modalities for pain/edema managment. Progressing  2)   Increase ROM 10 degrees in left shoulder abduction/flexion to increase functional hand use for overhead reaching. Progressing  3)   Decrease complaints of pain to  3 out of 10 at worst to increase functional hand use for ADL/work/leisure activities. Progressing  Plan   Cont OT to address above goals.      GARRETT Freeman OTR/L, CHT

## 2023-04-05 ENCOUNTER — CLINICAL SUPPORT (OUTPATIENT)
Dept: REHABILITATION | Facility: HOSPITAL | Age: 58
End: 2023-04-05
Attending: PHYSICIAN ASSISTANT
Payer: COMMERCIAL

## 2023-04-05 DIAGNOSIS — M25.512 CHRONIC LEFT SHOULDER PAIN: Primary | ICD-10-CM

## 2023-04-05 DIAGNOSIS — G89.29 CHRONIC LEFT SHOULDER PAIN: Primary | ICD-10-CM

## 2023-04-05 PROCEDURE — 97010 HOT OR COLD PACKS THERAPY: CPT | Mod: PO

## 2023-04-05 PROCEDURE — 97530 THERAPEUTIC ACTIVITIES: CPT | Mod: PO

## 2023-04-07 ENCOUNTER — NURSE TRIAGE (OUTPATIENT)
Dept: ADMINISTRATIVE | Facility: CLINIC | Age: 58
End: 2023-04-07
Payer: COMMERCIAL

## 2023-04-07 NOTE — TELEPHONE ENCOUNTER
Corrie c/o post nasal drip, throat itchy & productive cough with yellow sputum. Peak flow went to 250. Pt states 350 is good for her. Wears oscillatory vest. Becoming sweaty while doing house work. Recently around granddaughter who was sick. Worried because she has immune deficiency and had pseudomonas that was causing pneumonia in 2022. Back of head, throat and chest feels tight. Advised pt per triage protocol to go to nearest ED now for physician delfino. Instructed to call  now if no immediate . V/u.   Reason for Disposition   Patient sounds very sick or weak to the triager    Additional Information   Negative: SEVERE difficulty breathing (e.g., struggling for each breath, speaks in single words)   Negative: Bluish (or gray) lips or face now   Negative: [1] Difficulty breathing AND [2] exposure to flames, smoke, or fumes   Negative: [1] Stridor AND [2] difficulty breathing   Negative: Sounds like a life-threatening emergency to the triager   Negative: [1] Previous asthma attacks AND [2] this feels like asthma attack   Negative: [1] MODERATE difficulty breathing (e.g., speaks in phrases, SOB even at rest, pulse 100-120) AND [2] still present when not coughing   Negative: Chest pain  (Exception: MILD central chest pain, present only when coughing)    Protocols used: Cough - Acute Ccxnfjmixt-D-JD

## 2023-04-11 NOTE — PROGRESS NOTES
Occupational Therapy Daily Treatment Note     Date: 4/12/2023  Name: Corrie Calix  Clinic Number: [id number]    TTherapy Diagnosis:   1. Chronic left shoulder pain  Ambulatory referral/consult to Physical/Occupational Therapy             Medical Diagnosis: M25.512,G89.29 (ICD-10-CM) - Chronic left shoulder pain  Physician Orders: Shoulder pain with ROM, restart therapy  Eval and Treat, modalities as needed  8+ visits  Evaluation Date: 2/6/2023  Plan of Care Certification Date: 5/6/2023  Authorization Period: 12/31/2023  Surgery Date and Procedure: N/A  Date of Return to MD: 3/14/2023     Visit #: 4 of 10  Time In: 4:30 PM  Time Out: 5:15 PM  Total Billable Time: 45 min     Precautions: Standard      Subjective     Pt reports: Increased pain today  Response to previous treatment:Jimmie well    Pain: 4/10  Location: Superior/posterior shld  Objective     Corrie received the following supervised modalities after being cleared for contraindications for 10 minutes in order to promote increased blood flow and tissue elasticity:   -MH to left shoulder    Corrie participated in dynamic functional therapeutic activities to improve functional performance for 15  minutes, including:  - UBE forward/backward 3' ea  -Shld ladder SA 10  -IR stretches w/ red t-band 10 count x 10  -Multi-directional scap stabilization 30' x 4 rounds    Corrie received the following manual therapy techniques for 15 minutes in order to maximize tissue function and/or decrease pain:   -- PROM/joint mobs left shld    Home Exercises and Education Provided     Education provided:   - Work ergonomics   - Progress towards goals     Written Home Exercises Provided: Patient instructed to cont prior HEP.  Exercises were reviewed and Corrie was able to demonstrate them prior to the end of the session.  Corrie demonstrated good  understanding of the HEP provided.   .   See EMR under Patient Instructions for exercises provided prior visit.        Assessment     Pt  presents w/ overall increased left shld ROM, although somewhat more painful today.    Corrie is progressing towards her goals and there are no updates to goals at this time. Pt prognosis is Good.     Pt will continue to benefit from skilled outpatient occupational therapy to address the deficits listed in the problem list on initial evaluation provide pt/family education and to maximize pt's level of independence in the home and community environment.     Anticipated barriers to therapy: Co morbidities    Pt's spiritual, cultural and educational needs considered and pt agreeable to plan of care and goals.       Goals:    LTG's (10 weeks):  1)   Increase ROM 20 degrees in left shoulder abduction/flexion to increase functional hand use for overhead reaching. Progressing  2)   Decrease complaints of pain to 2 out of 10 at worst to increase functional hand use for ADL/work/leisure activities. Progressing  3)   Patient to score at 25% or less on FOTO to demonstrate improved perception of functional LUE use. Progressing  4)   Pt will return to near to prior level of function for ADLs and household management reporting I or Mod I with ADLs (dressing, feeding, grooming, toileting). Progressing     STG's (5 weeks)  1)   Patient to be IND with HEP and modalities for pain/edema managment. Progressing  2)   Increase ROM 10 degrees in left shoulder abduction/flexion to increase functional hand use for overhead reaching. Progressing  3)   Decrease complaints of pain to  3 out of 10 at worst to increase functional hand use for ADL/work/leisure activities. Progressing  Plan   Cont OT to address above goals.      GARRETT Freeman OTR/L, CHT

## 2023-04-12 ENCOUNTER — CLINICAL SUPPORT (OUTPATIENT)
Dept: REHABILITATION | Facility: HOSPITAL | Age: 58
End: 2023-04-12
Attending: PHYSICIAN ASSISTANT
Payer: COMMERCIAL

## 2023-04-12 DIAGNOSIS — G89.29 CHRONIC LEFT SHOULDER PAIN: Primary | ICD-10-CM

## 2023-04-12 DIAGNOSIS — M25.512 CHRONIC LEFT SHOULDER PAIN: Primary | ICD-10-CM

## 2023-04-12 PROCEDURE — 97010 HOT OR COLD PACKS THERAPY: CPT | Mod: PO

## 2023-04-12 PROCEDURE — 97530 THERAPEUTIC ACTIVITIES: CPT | Mod: PO

## 2023-04-12 PROCEDURE — 97140 MANUAL THERAPY 1/> REGIONS: CPT | Mod: PO

## 2023-04-17 ENCOUNTER — CLINICAL SUPPORT (OUTPATIENT)
Dept: REHABILITATION | Facility: HOSPITAL | Age: 58
End: 2023-04-17
Attending: ORTHOPAEDIC SURGERY
Payer: COMMERCIAL

## 2023-04-17 DIAGNOSIS — M25.512 CHRONIC LEFT SHOULDER PAIN: Primary | ICD-10-CM

## 2023-04-17 DIAGNOSIS — G89.29 CHRONIC LEFT SHOULDER PAIN: Primary | ICD-10-CM

## 2023-04-17 PROCEDURE — 97140 MANUAL THERAPY 1/> REGIONS: CPT | Mod: PO

## 2023-04-17 PROCEDURE — 97010 HOT OR COLD PACKS THERAPY: CPT | Mod: PO

## 2023-04-17 PROCEDURE — 97150 GROUP THERAPEUTIC PROCEDURES: CPT | Mod: PO

## 2023-04-21 ENCOUNTER — PATIENT MESSAGE (OUTPATIENT)
Dept: ADMINISTRATIVE | Facility: HOSPITAL | Age: 58
End: 2023-04-21
Payer: COMMERCIAL

## 2023-04-21 NOTE — PROGRESS NOTES
Occupational Therapy Daily Treatment Note     Date: 4/24/2023  Name: Corrie Calix  Clinic Number: [id number]    TTherapy Diagnosis:   1. Chronic left shoulder pain  Ambulatory referral/consult to Physical/Occupational Therapy             Medical Diagnosis: M25.512,G89.29 (ICD-10-CM) - Chronic left shoulder pain  Physician Orders: Shoulder pain with ROM, restart therapy  Eval and Treat, modalities as needed  8+ visits  Evaluation Date: 2/6/2023  Plan of Care Certification Date: 5/6/2023  Authorization Period: 12/31/2023  Surgery Date and Procedure: N/A  Date of Return to MD: 3/14/2023     Visit #: 7 of 10  Time In: 3:30 PM  Time Out: 4:15 PM  Total Billable Time: 40 min     Precautions: Standard      Subjective     Pt reports: Shoulder feeling better  Response to previous treatment:Jimmie well    Pain: 0/10  Location: Superior/posterior shld  Objective     Corrie received the following supervised modalities after being cleared for contraindications for 10 minutes in order to promote increased blood flow and tissue elasticity:   -MH to left shoulder    Corrie participated in dynamic functional therapeutic activities to improve functional performance for 15  minutes, including:  -Pulleys 5'  -Wall pushups w/ handles 2x10  - UBE forward/backward 3' ea  -Scaption 2# dumbell 3x10    Corrie received the following manual therapy techniques for 15 minutes in order to maximize tissue function and/or decrease pain:   -- PROM/joint mobs left shld    Home Exercises and Education Provided     Education provided:   - Progress towards goals     Written Home Exercises Provided: Patient instructed to cont prior HEP.  Exercises were reviewed and Corrie was able to demonstrate them prior to the end of the session.  Corrie demonstrated good  understanding of the HEP provided.   .   See EMR under Patient Instructions for exercises provided prior visit.        Assessment     Pt encouraged to return to premorbid weightlifting activities      Corrie is progressing towards her goals and there are no updates to goals at this time. Pt prognosis is Good.     Pt will continue to benefit from skilled outpatient occupational therapy to address the deficits listed in the problem list on initial evaluation provide pt/family education and to maximize pt's level of independence in the home and community environment.     Anticipated barriers to therapy: Co morbidities    Pt's spiritual, cultural and educational needs considered and pt agreeable to plan of care and goals.       Goals:    LTG's (10 weeks):  1)   Increase ROM 20 degrees in left shoulder abduction/flexion to increase functional hand use for overhead reaching. Progressing  2)   Decrease complaints of pain to 2 out of 10 at worst to increase functional hand use for ADL/work/leisure activities. Progressing  3)   Patient to score at 25% or less on FOTO to demonstrate improved perception of functional LUE use. Progressing  4)   Pt will return to near to prior level of function for ADLs and household management reporting I or Mod I with ADLs (dressing, feeding, grooming, toileting). Progressing     STG's (5 weeks)  1)   Patient to be IND with HEP and modalities for pain/edema managment. Progressing  2)   Increase ROM 10 degrees in left shoulder abduction/flexion to increase functional hand use for overhead reaching. Progressing  3)   Decrease complaints of pain to  3 out of 10 at worst to increase functional hand use for ADL/work/leisure activities. Progressing  Plan   Cont OT to address above goals.      GARRETT Freeman OTR/L, CHT

## 2023-04-24 ENCOUNTER — CLINICAL SUPPORT (OUTPATIENT)
Dept: REHABILITATION | Facility: HOSPITAL | Age: 58
End: 2023-04-24
Attending: PHYSICIAN ASSISTANT
Payer: COMMERCIAL

## 2023-04-24 DIAGNOSIS — G89.29 CHRONIC LEFT SHOULDER PAIN: Primary | ICD-10-CM

## 2023-04-24 DIAGNOSIS — M25.512 CHRONIC LEFT SHOULDER PAIN: Primary | ICD-10-CM

## 2023-04-24 PROCEDURE — 97140 MANUAL THERAPY 1/> REGIONS: CPT | Mod: PO

## 2023-04-24 PROCEDURE — 97530 THERAPEUTIC ACTIVITIES: CPT | Mod: PO

## 2023-04-24 PROCEDURE — 97010 HOT OR COLD PACKS THERAPY: CPT | Mod: PO

## 2023-04-28 ENCOUNTER — PATIENT MESSAGE (OUTPATIENT)
Dept: INFECTIOUS DISEASES | Facility: CLINIC | Age: 58
End: 2023-04-28
Payer: COMMERCIAL

## 2023-05-01 DIAGNOSIS — J47.9 ADULT BRONCHIECTASIS: Primary | ICD-10-CM

## 2023-05-01 DIAGNOSIS — J15.1 PNEUMONIA DUE TO PSEUDOMONAS SPECIES, UNSPECIFIED LATERALITY, UNSPECIFIED PART OF LUNG: ICD-10-CM

## 2023-05-01 RX ORDER — TOBRAMYCIN INHALATION 300 MG/4ML
300 SOLUTION RESPIRATORY (INHALATION) EVERY 12 HOURS
Qty: 224 ML | Refills: 6 | Status: SHIPPED | OUTPATIENT
Start: 2023-05-01

## 2023-05-12 DIAGNOSIS — J47.9 ADULT BRONCHIECTASIS: ICD-10-CM

## 2023-05-12 RX ORDER — SODIUM CHLORIDE FOR INHALATION 3 %
4 VIAL, NEBULIZER (ML) INHALATION 2 TIMES DAILY
Qty: 240 ML | Refills: 11 | Status: SHIPPED | OUTPATIENT
Start: 2023-05-12

## 2023-05-25 ENCOUNTER — PATIENT MESSAGE (OUTPATIENT)
Dept: INFECTIOUS DISEASES | Facility: CLINIC | Age: 58
End: 2023-05-25
Payer: COMMERCIAL

## 2023-05-25 DIAGNOSIS — J45.909 ASTHMA, UNSPECIFIED ASTHMA SEVERITY, UNSPECIFIED WHETHER COMPLICATED, UNSPECIFIED WHETHER PERSISTENT: Primary | ICD-10-CM

## 2023-05-31 ENCOUNTER — PATIENT MESSAGE (OUTPATIENT)
Dept: INTERNAL MEDICINE | Facility: CLINIC | Age: 58
End: 2023-05-31
Payer: COMMERCIAL

## 2023-05-31 DIAGNOSIS — J45.20 MILD INTERMITTENT ASTHMA WITHOUT COMPLICATION: Primary | ICD-10-CM

## 2023-06-06 ENCOUNTER — PATIENT MESSAGE (OUTPATIENT)
Dept: INTERNAL MEDICINE | Facility: CLINIC | Age: 58
End: 2023-06-06
Payer: COMMERCIAL

## 2023-07-18 ENCOUNTER — TELEPHONE (OUTPATIENT)
Dept: ORTHOPEDICS | Facility: CLINIC | Age: 58
End: 2023-07-18
Payer: COMMERCIAL

## 2023-07-20 ENCOUNTER — OFFICE VISIT (OUTPATIENT)
Dept: ORTHOPEDICS | Facility: CLINIC | Age: 58
End: 2023-07-20
Payer: COMMERCIAL

## 2023-07-20 VITALS
DIASTOLIC BLOOD PRESSURE: 66 MMHG | BODY MASS INDEX: 16.95 KG/M2 | SYSTOLIC BLOOD PRESSURE: 104 MMHG | HEIGHT: 67 IN | HEART RATE: 92 BPM | WEIGHT: 108 LBS

## 2023-07-20 DIAGNOSIS — G89.29 CHRONIC LEFT SHOULDER PAIN: ICD-10-CM

## 2023-07-20 DIAGNOSIS — M25.512 CHRONIC LEFT SHOULDER PAIN: ICD-10-CM

## 2023-07-20 DIAGNOSIS — M75.02 ADHESIVE CAPSULITIS OF LEFT SHOULDER: Primary | ICD-10-CM

## 2023-07-20 DIAGNOSIS — G56.03 BILATERAL CARPAL TUNNEL SYNDROME: ICD-10-CM

## 2023-07-20 PROCEDURE — 20610 DRAIN/INJ JOINT/BURSA W/O US: CPT | Mod: LT,S$GLB,, | Performed by: ORTHOPAEDIC SURGERY

## 2023-07-20 PROCEDURE — 1159F MED LIST DOCD IN RCRD: CPT | Mod: CPTII,S$GLB,, | Performed by: ORTHOPAEDIC SURGERY

## 2023-07-20 PROCEDURE — 99999 PR PBB SHADOW E&M-EST. PATIENT-LVL IV: CPT | Mod: PBBFAC,,, | Performed by: ORTHOPAEDIC SURGERY

## 2023-07-20 PROCEDURE — 3074F PR MOST RECENT SYSTOLIC BLOOD PRESSURE < 130 MM HG: ICD-10-PCS | Mod: CPTII,S$GLB,, | Performed by: ORTHOPAEDIC SURGERY

## 2023-07-20 PROCEDURE — 99999 PR PBB SHADOW E&M-EST. PATIENT-LVL IV: ICD-10-PCS | Mod: PBBFAC,,, | Performed by: ORTHOPAEDIC SURGERY

## 2023-07-20 PROCEDURE — 3074F SYST BP LT 130 MM HG: CPT | Mod: CPTII,S$GLB,, | Performed by: ORTHOPAEDIC SURGERY

## 2023-07-20 PROCEDURE — 3008F BODY MASS INDEX DOCD: CPT | Mod: CPTII,S$GLB,, | Performed by: ORTHOPAEDIC SURGERY

## 2023-07-20 PROCEDURE — 99214 PR OFFICE/OUTPT VISIT, EST, LEVL IV, 30-39 MIN: ICD-10-PCS | Mod: 25,S$GLB,, | Performed by: ORTHOPAEDIC SURGERY

## 2023-07-20 PROCEDURE — 3078F PR MOST RECENT DIASTOLIC BLOOD PRESSURE < 80 MM HG: ICD-10-PCS | Mod: CPTII,S$GLB,, | Performed by: ORTHOPAEDIC SURGERY

## 2023-07-20 PROCEDURE — 3078F DIAST BP <80 MM HG: CPT | Mod: CPTII,S$GLB,, | Performed by: ORTHOPAEDIC SURGERY

## 2023-07-20 PROCEDURE — 20610 LARGE JOINT ASPIRATION/INJECTION: L SUBACROMIAL BURSA: ICD-10-PCS | Mod: LT,S$GLB,, | Performed by: ORTHOPAEDIC SURGERY

## 2023-07-20 PROCEDURE — 3008F PR BODY MASS INDEX (BMI) DOCUMENTED: ICD-10-PCS | Mod: CPTII,S$GLB,, | Performed by: ORTHOPAEDIC SURGERY

## 2023-07-20 PROCEDURE — 1159F PR MEDICATION LIST DOCUMENTED IN MEDICAL RECORD: ICD-10-PCS | Mod: CPTII,S$GLB,, | Performed by: ORTHOPAEDIC SURGERY

## 2023-07-20 PROCEDURE — 99214 OFFICE O/P EST MOD 30 MIN: CPT | Mod: 25,S$GLB,, | Performed by: ORTHOPAEDIC SURGERY

## 2023-07-20 RX ORDER — FLUTICASONE PROPIONATE 50 MCG
2 SPRAY, SUSPENSION (ML) NASAL
COMMUNITY
Start: 2023-03-15 | End: 2024-03-14

## 2023-07-20 RX ORDER — OMALIZUMAB 202.5 MG/1.4ML
INJECTION, SOLUTION SUBCUTANEOUS
COMMUNITY

## 2023-07-20 RX ORDER — NORETHINDRONE AND ETHINYL ESTRADIOL 1 MG-35MCG
KIT ORAL
COMMUNITY

## 2023-07-20 RX ORDER — FLUTICASONE PROPIONATE 50 MCG
2 SPRAY, SUSPENSION (ML) NASAL
COMMUNITY
Start: 2023-05-09

## 2023-07-20 RX ORDER — FLUTICASONE PROPIONATE AND SALMETEROL 100; 50 UG/1; UG/1
POWDER RESPIRATORY (INHALATION)
COMMUNITY

## 2023-07-20 RX ADMIN — TRIAMCINOLONE ACETONIDE 80 MG: 40 INJECTION, SUSPENSION INTRA-ARTICULAR; INTRAMUSCULAR at 11:07

## 2023-07-20 NOTE — PROGRESS NOTES
Subjective:      Patient ID: Corrie Calix is a 58 y.o. female.    Chief Complaint: Pain of the Left Shoulder      HPI  Corrie Calix is a right hand dominant 58 y.o. female, with immune deficiency, presenting today for follow up of bilateral carpal tunnel syndrome and left shoulder pain.  She underwent left shoulder injection at her last visit (8/2022) and attended OT. Reports improvement in pain and motion in the left shoulder, as well as improvement in bilateral hand numbness. She reports that she had COVID 10/31/2022 and her shoulder pain has started increasing since that time. She continues to perform home exercises, reports intermittent pain that radiates down the arms. She canceled her planned carpal tunnel release in October. Finger numbness and bilateral upper and lower extremity pain began 3/2022.   States she woke up one morning 3/2022 and had inflammation and pain in multiple joints bilateral upper and lower extremities including BL shoulders (L>R), BL hands (mostly MP joints) that is worse in the morning. Has been seeing rheumatology and her PCP for workup and management. EMG of ZAC was ordered and showed BL severe carpal tunnel syndrome so she was referred to Dr. Piper for surgical evaluation.  She does take gabapentin 300 mg nightly.  At her last visit with Dr. Piper bilateral carpal tunnel releases were recommended and steroid injection and therapy for suspected left frozen shoulder    Interval History 7/20/23  Patient returns today for follow up, reports 5-7/10 intermittent shoulder pain worsened overnight and improved with her home exercise program and movement. Denies any numbness or tingling, but EMG showed severe carpal tunnel. Has completed PT 6 weeks ago. Doing much better overall with exception of moderate shoulder pain.    Review of patient's allergies indicates:   Allergen Reactions    Jethro-dur Other (See Comments)    Theophylline Nausea And Vomiting and Other (See Comments)          Current Outpatient Medications   Medication Sig Dispense Refill    albuterol (PROVENTIL) 2.5 mg /3 mL (0.083 %) nebulizer solution FOR USE WITH NEBULIZER 3 TIMES A DAY AS NEEDED      cetirizine (ZYRTEC) 10 MG tablet Take 10 mg by mouth once daily.      COMP-AIR NEBULIZER COMPRESSOR Yoselyn use as directed      dupilumab (DUPIXENT PEN) 300 mg/2 mL PnIj Inject 300 mg into the skin every 14 (fourteen) days.      EPINEPHrine (EPIPEN JR) 0.15 mg/0.3 mL pen injection Inject 0.15 mg into the muscle.      fluticasone propionate (FLONASE) 50 mcg/actuation nasal spray 2 sprays by Each Nostril route.      fluticasone propionate (FLONASE) 50 mcg/actuation nasal spray 2 sprays by Nasal route.      fluticasone-salmeterol diskus inhaler 100-50 mcg 1 puff(s) inhaled 2 times a day      immun glob G,IgG,-gly-IgA ov50 (CUVITRU) 8 gram/40 mL (20 %) Soln 15 gram      montelukast (SINGULAIR) 10 mg tablet Take 10 mg by mouth nightly.      norethindrone-ethinyl estradiol (DASETTA 1/35, 28,) 1-35 mg-mcg per tablet 1 tab(s) orally once a day, skip placebo tablets for 21      omalizumab (XOLAIR) 150 mg injection as directed subcutaneously every 4 weeks      sodium chloride 3% 3 % nebulizer solution TAKE 4 MLS BY NEBULIZATION 2 (TWO) TIMES A DAY. 240 mL 11    tobramycin (BETHKIS) 300 mg/4 mL Nebu Inhale 4 mLs (300 mg total) into the lungs every 12 (twelve) hours. 28 days on, 28 days off 224 mL 6    tobramycin, PF, (PIETER) 300 mg/5 mL nebulizer solution Take 5 mLs (300 mg total) by nebulization every 12 (twelve) hours. Take medication for 14 days on, followed by 14 days off 140 mL 11    diazePAM (VALIUM) 5 MG tablet Take 1 tablet (5 mg total) by mouth every 6 (six) hours as needed for Anxiety (For anxiety regarding MRI take 1 hour prior to imaging). 2 tablet 0    gabapentin (NEURONTIN) 100 MG capsule Take 1 capsule (100 mg total) by mouth 3 (three) times daily. (Patient taking differently: Take 300 mg by mouth. At night) 90 capsule 11     "naproxen (NAPROSYN) 500 MG tablet Take 1 tablet (500 mg total) by mouth 2 (two) times daily with meals. (Patient not taking: Reported on 8/18/2022) 28 tablet 0     No current facility-administered medications for this visit.       Past Medical History:   Diagnosis Date    Allergy     Anti-polysaccharide antibody deficiency     Asthma     Pleurisy     Pneumothorax        Past Surgical History:   Procedure Laterality Date    BRONCHOSCOPY N/A 6/29/2021    Procedure: BRONCHOSCOPY;  Surgeon: [facility];  Location: [facility] OR [address] [place] [address];  Service: Anesthesiology;  Laterality: N/A;    EYE SURGERY      LUNG SURGERY           Review of Systems:  ROS:  Constitutional: no fever or chills  Skin: no rash or suspicious lesions  Musculoskeletal: See HPI.   Neurological: no headaches, lightheadedness, or dizziness. No numbness or tingling  Psychological/behavioral: no anxiety or depression      OBJECTIVE:     PHYSICAL EXAM:  Height: 5' 7" (170.2 cm) Weight: 49 kg (108 lb)  Vitals:    07/20/23 1113   BP: 104/66   Pulse: 92   Weight: 49 kg (108 lb)   Height: 5' 7" (1.702 m)   PainSc:   4   PainLoc: Shoulder     Vitals reviewed.  Constitutional: NAD. Patient appears well-developed and well-nourished.   HENT:   Head: Normocephalic and atraumatic.   Neck: Normal range of motion.   Cardiovascular: Normal rate.    Pulmonary/Chest: Effort normal. No respiratory distress.   Neurological: Patient is awake, alert, oriented.   Psychiatric: Patient has a normal mood and affect. Behavior is normal. Judgment and thought content normal.  Musculoskeletal:  Tattoos bilaterally.  No scars noted, no lacerations or abrasions.  No edema.  No significant tenderness to palpation.  Good finger, wrist, and elbow range of motion bilaterally.  Good shoulder range of motion to left side, almost full.  She has approximately 160° of forward flexion and abduction, internal rotation to the level of T12, good adduction and external rotation.  Negative " impingement sign bilaterally.  Neurovascularly intact-she reports equal sensation in the median, ulnar, and radial distributions.  Good motor function bilaterally.  Good capillary refill, 2+ radial pulses.  Negative Tinel's bilaterally over the carpal tunnels and cubital tunnels, negative Durkan's bilaterally.    RADIOGRAPHS:  Left shoulder XRay, 2022  FINDINGS:  The osseous structures appear intact without evidence of a displaced fracture or osseous destructive lesion.  No evidence of dislocation.  No advanced degenerative change.  No calcifications to suggest tendonitis.  Impression:  No evidence of fracture or dislocation.    Bilateral Hand XRay, 2022  FINDINGS:  Normal mineralization.  No fracture or dislocation.  Joint spaces are preserved.  No erosions or bony proliferative changes.  There is symmetric juxta-articular soft tissue swelling, for example in middle finger PIP joints bilaterally, nonspecific but could represent joint inflammation.  No soft tissue calcifications.  Impression:  As above.    Comments: I have personally reviewed the imaging and I agree with the above radiologist's report.    EM2022  Impression   This is an abnormal study. There is electrophysiologic evidence of:   1. Severe bilateral carpal tunnel syndrome (median neuropathy at the wrist) with secondary denervation in both APB muscles,   2. Chronic denervation in the right C7 and C8 myotomes suggestive of radiculopathy at those levels.     ASSESSMENT/PLAN:   Corrie was seen today for pain.    Diagnoses and all orders for this visit:    Adhesive capsulitis of left shoulder    Chronic left shoulder pain    Bilateral carpal tunnel syndrome        Plan:  Left shoulder injection  Follow up PRN

## 2023-07-20 NOTE — PROCEDURES
Large Joint Aspiration/Injection: L subacromial bursa    Date/Time: 7/20/2023 11:15 AM  Performed by: Catie Piper MD  Authorized by: Catie Piper MD     Consent Done?:  Yes (Verbal)  Indications:  Pain  Timeout: prior to procedure the correct patient, procedure, and site was verified    Prep: patient was prepped and draped in usual sterile fashion      Local anesthesia used?: Yes    Anesthesia:  Local infiltration  Local anesthetic:  Lidocaine 1% without epinephrine    Details:  Needle Size:  22 G  Approach:  Posterior  Location:  Shoulder  Site:  L subacromial bursa  Medications:  80 mg triamcinolone acetonide 40 mg/mL

## 2023-07-28 RX ORDER — TRIAMCINOLONE ACETONIDE 40 MG/ML
80 INJECTION, SUSPENSION INTRA-ARTICULAR; INTRAMUSCULAR
Status: DISCONTINUED | OUTPATIENT
Start: 2023-07-20 | End: 2023-07-28 | Stop reason: HOSPADM

## 2023-11-30 DIAGNOSIS — M25.512 LEFT SHOULDER PAIN, UNSPECIFIED CHRONICITY: Primary | ICD-10-CM

## 2023-12-27 ENCOUNTER — TELEPHONE (OUTPATIENT)
Dept: ORTHOPEDICS | Facility: CLINIC | Age: 58
End: 2023-12-27
Payer: COMMERCIAL

## 2023-12-27 NOTE — TELEPHONE ENCOUNTER
LVM c pt. Attempting to confirm appt location & time c Dr. Houston  with XR. Requested a call back to the [facility] at [phone] with any questions, concerns or need for a different appointment time.

## 2024-03-13 ENCOUNTER — TELEPHONE (OUTPATIENT)
Dept: ORTHOPEDICS | Facility: CLINIC | Age: 59
End: 2024-03-13
Payer: COMMERCIAL

## 2024-03-13 NOTE — TELEPHONE ENCOUNTER
I called the patient and reminded them of their appointment on 04/02/2024 at 2:45 p.m. with Dr. Piper. I informed them where they need to go and to arrive 15 minutes before their appointment. The patient understood and agreed.

## 2024-04-27 ENCOUNTER — PATIENT MESSAGE (OUTPATIENT)
Dept: INTERNAL MEDICINE | Facility: CLINIC | Age: 59
End: 2024-04-27
Payer: COMMERCIAL

## 2024-04-27 DIAGNOSIS — Z00.00 ANNUAL PHYSICAL EXAM: ICD-10-CM

## 2024-04-27 DIAGNOSIS — J45.20 MILD INTERMITTENT ASTHMA WITHOUT COMPLICATION: Primary | ICD-10-CM

## 2025-04-18 NOTE — PROGRESS NOTES
Occupational Therapy Daily Treatment Note     Date: 4/17/2023  Name: Corrie Calix  Clinic Number: [id number]    TTherapy Diagnosis:   1. Chronic left shoulder pain  Ambulatory referral/consult to Physical/Occupational Therapy             Medical Diagnosis: M25.512,G89.29 (ICD-10-CM) - Chronic left shoulder pain  Physician Orders: Shoulder pain with ROM, restart therapy  Eval and Treat, modalities as needed  8+ visits  Evaluation Date: 2/6/2023  Plan of Care Certification Date: 5/6/2023  Authorization Period: 12/31/2023  Surgery Date and Procedure: N/A  Date of Return to MD: 3/14/2023     Visit #: 6 of 10  Time In: 4:15 PM  Time Out: 5:30 PM  Total Billable Time: 45 min     Precautions: Standard      Subjective     Pt reports: Increased pain today 2/2 family/young children visiting  Response to previous treatment:Jimmie well    Pain: 4/10  Location: Superior/posterior shld  Objective     Corrie received the following supervised modalities after being cleared for contraindications for 10 minutes in order to promote increased blood flow and tissue elasticity:   -MH to left shoulder    Corrie participated in dynamic functional therapeutic activities to improve functional performance for 15  minutes, including:  -Pulleys 5'  -Wall pushups w/ handles 2x10  - UBE forward/backward 3' ea  -Shld ladder SA 10    Corrie received the following manual therapy techniques for 15 minutes in order to maximize tissue function and/or decrease pain:   -- PROM/joint mobs left shld    Home Exercises and Education Provided     Education provided:   - Work ergonomics   - Progress towards goals     Written Home Exercises Provided: Patient instructed to cont prior HEP.  Exercises were reviewed and Corrie was able to demonstrate them prior to the end of the session.  Corrie demonstrated good  understanding of the HEP provided.   .   See EMR under Patient Instructions for exercises provided prior visit.        Assessment     Pt presents w/  Paperwork order signed   overall increased left shld ROM, although somewhat more painful today.    Corrie is progressing towards her goals and there are no updates to goals at this time. Pt prognosis is Good.     Pt will continue to benefit from skilled outpatient occupational therapy to address the deficits listed in the problem list on initial evaluation provide pt/family education and to maximize pt's level of independence in the home and community environment.     Anticipated barriers to therapy: Co morbidities    Pt's spiritual, cultural and educational needs considered and pt agreeable to plan of care and goals.       Goals:    LTG's (10 weeks):  1)   Increase ROM 20 degrees in left shoulder abduction/flexion to increase functional hand use for overhead reaching. Progressing  2)   Decrease complaints of pain to 2 out of 10 at worst to increase functional hand use for ADL/work/leisure activities. Progressing  3)   Patient to score at 25% or less on FOTO to demonstrate improved perception of functional LUE use. Progressing  4)   Pt will return to near to prior level of function for ADLs and household management reporting I or Mod I with ADLs (dressing, feeding, grooming, toileting). Progressing     STG's (5 weeks)  1)   Patient to be IND with HEP and modalities for pain/edema managment. Progressing  2)   Increase ROM 10 degrees in left shoulder abduction/flexion to increase functional hand use for overhead reaching. Progressing  3)   Decrease complaints of pain to  3 out of 10 at worst to increase functional hand use for ADL/work/leisure activities. Progressing  Plan   Cont OT to address above goals.      GARRETT Freeman OTR/L, CHT

## 2025-05-21 DIAGNOSIS — Z12.31 OTHER SCREENING MAMMOGRAM: ICD-10-CM

## 2025-06-06 ENCOUNTER — HOSPITAL ENCOUNTER (OUTPATIENT)
Dept: RADIOLOGY | Facility: HOSPITAL | Age: 60
Discharge: HOME | End: 2025-06-06
Attending: INTERNAL MEDICINE
Payer: COMMERCIAL

## 2025-06-06 VITALS — HEIGHT: 67 IN | WEIGHT: 108 LBS | BODY MASS INDEX: 16.95 KG/M2

## 2025-06-06 DIAGNOSIS — Z12.31 OTHER SCREENING MAMMOGRAM: ICD-10-CM

## 2025-06-06 PROCEDURE — 77063 BREAST TOMOSYNTHESIS BI: CPT | Mod: TC

## 2025-06-06 PROCEDURE — 77067 SCR MAMMO BI INCL CAD: CPT | Mod: 26,,, | Performed by: RADIOLOGY

## 2025-06-06 PROCEDURE — 77063 BREAST TOMOSYNTHESIS BI: CPT | Mod: 26,,, | Performed by: RADIOLOGY

## 2025-06-10 ENCOUNTER — RESULTS FOLLOW-UP (OUTPATIENT)
Dept: INTERNAL MEDICINE | Facility: CLINIC | Age: 60
End: 2025-06-10

## 2025-06-13 ENCOUNTER — TELEPHONE (OUTPATIENT)
Dept: OBSTETRICS AND GYNECOLOGY | Facility: CLINIC | Age: 60
End: 2025-06-13
Payer: COMMERCIAL

## 2025-06-13 ENCOUNTER — OFFICE VISIT (OUTPATIENT)
Dept: INTERNAL MEDICINE | Facility: CLINIC | Age: 60
End: 2025-06-13
Payer: COMMERCIAL

## 2025-06-13 VITALS
SYSTOLIC BLOOD PRESSURE: 120 MMHG | HEART RATE: 87 BPM | OXYGEN SATURATION: 97 % | WEIGHT: 112.44 LBS | DIASTOLIC BLOOD PRESSURE: 80 MMHG | BODY MASS INDEX: 17.61 KG/M2

## 2025-06-13 DIAGNOSIS — D80.6 SPECIFIC ANTIBODY DEFICIENCY WITH NORMAL IG CONCENTRATION AND NORMAL NUMBER OF B CELLS: ICD-10-CM

## 2025-06-13 DIAGNOSIS — Z92.241 PERSONAL HISTORY OF SYSTEMIC STEROID THERAPY: ICD-10-CM

## 2025-06-13 DIAGNOSIS — J47.9 ADULT BRONCHIECTASIS: ICD-10-CM

## 2025-06-13 DIAGNOSIS — Z01.419 WELL WOMAN EXAM: ICD-10-CM

## 2025-06-13 DIAGNOSIS — Z12.11 COLON CANCER SCREENING: ICD-10-CM

## 2025-06-13 DIAGNOSIS — Z00.00 ANNUAL PHYSICAL EXAM: Primary | ICD-10-CM

## 2025-06-13 DIAGNOSIS — J45.909 ASTHMA, UNSPECIFIED ASTHMA SEVERITY, UNSPECIFIED WHETHER COMPLICATED, UNSPECIFIED WHETHER PERSISTENT: ICD-10-CM

## 2025-06-13 PROCEDURE — 99999 PR PBB SHADOW E&M-EST. PATIENT-LVL V: CPT | Mod: PBBFAC,,, | Performed by: INTERNAL MEDICINE

## 2025-06-13 NOTE — PROGRESS NOTES
"    CHIEF COMPLAINT     Chief Complaint   Patient presents with    Annual Exam       HPI     Corrie Calix is a 60 y.o. female adult bronchiectasis, specific antibody deficiency here today for     Since last visit was restarted on Cuvitru. Improvement in functional status.  Back to most of her prior illness activities.  Still not exercising with the intensity she was prior.  She is able to dance in parades and walker old dog    Has not been seen by gyn in several years  Due for some of her immunizations    Personally Reviewed Patient's Medical, surgical, family and social hx. Changes updated in [facility].  Care Team updated in Epic    Review of Systems:  Review of Systems   Constitutional:  Negative for fatigue and fever.   Respiratory:  Negative for cough and shortness of breath.        Health Maintenance:   Reviewed with patient  Due for the following:      PHYSICAL EXAM     /80   Pulse 87   Wt 51 kg (112 lb 7 oz)   LMP 05/03/2021 (Within Months) Comment: "first week of May"  SpO2 97%   BMI 17.61 kg/m²     Gen: Well Appearing, NAD  HEENT: PERR, EOMI  Neck: FROM, no thyromegaly, no cervical adenopathy  CVD: RRR, no M/R/G  Pulm: Normal work of breathing, CTAB, no wheezing  Abd:  Soft, NT, ND non TTP, no mass  MSK: no LE edema  Neuro: A&Ox3, gait normal, speech normal  Mood; Mood normal, behavior normal, thought process linear       LABS     Labs reviewed; Notable for  CBC May 16th WBC 3.7 hemoglobin 13.1 platelets 167  CMP looks great  ASSESSMENT     1. Annual physical exam  CBC Auto Differential    Comprehensive Metabolic Panel    Hemoglobin A1C    Lipid Panel      2. 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 Asthma, unspecified asthma severity, unspecified whether complicated, unspecified whether persistent  Ambulatory referral/consult to Pulmonary Rehab      4. Personal history of systemic steroid therapy  DXA Bone Density Axial Skeleton 1 or more " sites      5. Adult bronchiectasis  Ambulatory referral/consult to Pulmonary Rehab      6. Colon cancer screening  Ambulatory referral/consult to Endo Procedure               Plan     Corrie Calix is a 60 y.o. female with bronchiectasis asthma specific antibody deficiency  1. Annual physical exam  Updated problem list, medical history, care team and discussed HM.   Shingrix today  - CBC Auto Differential; Future  - Comprehensive Metabolic Panel; Future  - Hemoglobin A1C; Future  - Lipid Panel; Future    2. 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    3. Asthma, unspecified asthma severity, unspecified whether complicated, unspecified whether persistent  Continue Dupixent  - Ambulatory referral/consult to Pulmonary Rehab; Future    4. Personal history of systemic steroid therapy  Risk factors for low bone density will get updated DEXA scan  - DXA Bone Density Axial Skeleton 1 or more sites; Future    5. Adult bronchiectasis  Will refer pulmonary rehab to see if we can help maximize functional status  - Ambulatory referral/consult to Pulmonary Rehab; Future    6. Colon cancer screening  - Ambulatory referral/consult to Endo Procedure ; Future      Elvis Cody MD

## 2025-06-13 NOTE — TELEPHONE ENCOUNTER
----- Message from Loren sent at 6/13/2025  4:38 PM CDT -----  Regarding: Appointment  Hi,     Patient requesting to schedule a new patient appt. Please contact the patient to schedule an appt.  Thanks,

## 2025-06-14 ENCOUNTER — LAB VISIT (OUTPATIENT)
Dept: LAB | Facility: HOSPITAL | Age: 60
End: 2025-06-14
Attending: INTERNAL MEDICINE
Payer: COMMERCIAL

## 2025-06-14 DIAGNOSIS — Z00.00 ANNUAL PHYSICAL EXAM: ICD-10-CM

## 2025-06-14 LAB
CHOLEST SERPL-MCNC: 182 MG/DL (ref 120–199)
CHOLEST/HDLC SERPL: 2.1 {RATIO} (ref 2–5)
EAG (OHS): 103 MG/DL (ref 68–131)
HBA1C MFR BLD: 5.2 % (ref 4–5.6)
HDLC SERPL-MCNC: 85 MG/DL (ref 40–75)
HDLC SERPL: 46.7 % (ref 20–50)
LDLC SERPL CALC-MCNC: 86.2 MG/DL (ref 63–159)
NONHDLC SERPL-MCNC: 97 MG/DL
TRIGL SERPL-MCNC: 54 MG/DL (ref 30–150)

## 2025-06-14 PROCEDURE — 83036 HEMOGLOBIN GLYCOSYLATED A1C: CPT

## 2025-06-14 PROCEDURE — 80061 LIPID PANEL: CPT

## 2025-06-14 PROCEDURE — 36415 COLL VENOUS BLD VENIPUNCTURE: CPT

## 2025-06-16 ENCOUNTER — RESULTS FOLLOW-UP (OUTPATIENT)
Dept: INTERNAL MEDICINE | Facility: CLINIC | Age: 60
End: 2025-06-16

## 2025-06-17 ENCOUNTER — TELEPHONE (OUTPATIENT)
Dept: ENDOSCOPY | Facility: HOSPITAL | Age: 60
End: 2025-06-17

## 2025-06-17 ENCOUNTER — TELEPHONE (OUTPATIENT)
Dept: OBSTETRICS AND GYNECOLOGY | Facility: CLINIC | Age: 60
End: 2025-06-17
Payer: COMMERCIAL

## 2025-06-17 NOTE — TELEPHONE ENCOUNTER
----- Message from Med Assistant Dumont sent at 6/13/2025  4:57 PM CDT -----  Regarding: FW: Appointment    ----- Message -----  From: Loren Gaston  Sent: 6/13/2025   4:39 PM CDT  To: Wagner Calderón Staff  Subject: Appointment                                      Hi,     Patient requesting to schedule a new patient appt. Please contact the patient to schedule an appt.  Thanks,

## 2025-06-18 ENCOUNTER — TELEPHONE (OUTPATIENT)
Dept: ENDOSCOPY | Facility: HOSPITAL | Age: 60
End: 2025-06-18
Payer: COMMERCIAL

## 2025-06-18 ENCOUNTER — TELEPHONE (OUTPATIENT)
Dept: OBSTETRICS AND GYNECOLOGY | Facility: CLINIC | Age: 60
End: 2025-06-18
Payer: COMMERCIAL

## 2025-06-18 VITALS — HEIGHT: 67 IN | BODY MASS INDEX: 17.58 KG/M2 | WEIGHT: 112 LBS

## 2025-06-18 DIAGNOSIS — Z12.11 COLON CANCER SCREENING: Primary | ICD-10-CM

## 2025-06-18 RX ORDER — POLYETHYLENE GLYCOL 3350, SODIUM SULFATE, POTASSIUM CHLORIDE, MAGNESIUM SULFATE, AND SODIUM CHLORIDE FOR ORAL SOLUTION 178.7-7.3G
1 KIT ORAL ONCE
Qty: 1 EACH | Refills: 0 | Status: SHIPPED | OUTPATIENT
Start: 2025-08-01 | End: 2025-08-01

## 2025-06-18 NOTE — TELEPHONE ENCOUNTER
Patient is scheduled for a Colonoscopy on 8/18/25 with Dr. TC Nj  [facility]

## 2025-06-18 NOTE — TELEPHONE ENCOUNTER
Copied from CRM #[id number]. Topic: General Inquiry - Return Call  >> Jun 18, 2025 12:15 PM Rowan wrote:  Type:  Patient Returning Call    Who Called:Corrie  Who Left Message for Patient:Julia  Does the patient know what this is regarding?:yes  Would the patient rather a call back or a response via [employer]? call  Best Call Back Number:[phone]  Additional Information:

## 2025-07-14 ENCOUNTER — HOSPITAL ENCOUNTER (OUTPATIENT)
Dept: RADIOLOGY | Facility: CLINIC | Age: 60
Discharge: HOME | End: 2025-07-14
Attending: INTERNAL MEDICINE
Payer: COMMERCIAL

## 2025-07-14 DIAGNOSIS — Z92.241 PERSONAL HISTORY OF SYSTEMIC STEROID THERAPY: ICD-10-CM

## 2025-07-14 PROCEDURE — 77080 DXA BONE DENSITY AXIAL: CPT | Mod: 26,,, | Performed by: INTERNAL MEDICINE

## 2025-07-14 PROCEDURE — 77080 DXA BONE DENSITY AXIAL: CPT | Mod: TC,FY

## 2025-08-17 ENCOUNTER — PATIENT MESSAGE (OUTPATIENT)
Dept: ADMINISTRATIVE | Facility: HOSPITAL | Age: 60
End: 2025-08-17
Payer: COMMERCIAL

## 2025-08-18 ENCOUNTER — HOSPITAL ENCOUNTER (OUTPATIENT)
Facility: HOSPITAL | Age: 60
Discharge: HOME | End: 2025-08-18
Attending: INTERNAL MEDICINE | Admitting: INTERNAL MEDICINE
Payer: COMMERCIAL

## 2025-08-18 ENCOUNTER — ANESTHESIA (OUTPATIENT)
Dept: ENDOSCOPY | Facility: HOSPITAL | Age: 60
End: 2025-08-18
Payer: COMMERCIAL

## 2025-08-18 ENCOUNTER — ANESTHESIA EVENT (OUTPATIENT)
Dept: ENDOSCOPY | Facility: HOSPITAL | Age: 60
End: 2025-08-18
Payer: COMMERCIAL

## 2025-08-18 ENCOUNTER — PATIENT OUTREACH (OUTPATIENT)
Dept: ADMINISTRATIVE | Facility: HOSPITAL | Age: 60
End: 2025-08-18
Payer: COMMERCIAL

## 2025-08-18 VITALS
RESPIRATION RATE: 16 BRPM | OXYGEN SATURATION: 100 % | HEIGHT: 66 IN | HEART RATE: 56 BPM | BODY MASS INDEX: 17.68 KG/M2 | SYSTOLIC BLOOD PRESSURE: 121 MMHG | WEIGHT: 110 LBS | DIASTOLIC BLOOD PRESSURE: 73 MMHG | TEMPERATURE: 98 F

## 2025-08-18 DIAGNOSIS — Z12.11 COLON CANCER SCREENING: ICD-10-CM

## 2025-08-18 DIAGNOSIS — Z12.4 ENCOUNTER FOR SCREENING FOR CERVICAL CANCER: Primary | ICD-10-CM

## 2025-08-18 DIAGNOSIS — Z12.11 SPECIAL SCREENING FOR MALIGNANT NEOPLASMS, COLON: Primary | ICD-10-CM

## 2025-08-18 PROCEDURE — 25000003 PHARM REV CODE 250: Performed by: INTERNAL MEDICINE

## 2025-08-18 PROCEDURE — 45380 COLONOSCOPY AND BIOPSY: CPT | Mod: 33,XS,, | Performed by: INTERNAL MEDICINE

## 2025-08-18 PROCEDURE — 45380 COLONOSCOPY AND BIOPSY: CPT | Mod: 33,XS | Performed by: INTERNAL MEDICINE

## 2025-08-18 PROCEDURE — 45385 COLONOSCOPY W/LESION REMOVAL: CPT | Mod: 33,,, | Performed by: INTERNAL MEDICINE

## 2025-08-18 PROCEDURE — 63600175 PHARM REV CODE 636 W HCPCS

## 2025-08-18 PROCEDURE — 37000008 HC ANESTHESIA 1ST 15 MINUTES: Performed by: INTERNAL MEDICINE

## 2025-08-18 PROCEDURE — 37000009 HC ANESTHESIA EA ADD 15 MINS: Performed by: INTERNAL MEDICINE

## 2025-08-18 PROCEDURE — 88305 TISSUE EXAM BY PATHOLOGIST: CPT | Mod: TC,91 | Performed by: INTERNAL MEDICINE

## 2025-08-18 PROCEDURE — 27201089 HC SNARE, DISP (ANY): Performed by: INTERNAL MEDICINE

## 2025-08-18 PROCEDURE — 27201012 HC FORCEPS, HOT/COLD, DISP: Performed by: INTERNAL MEDICINE

## 2025-08-18 PROCEDURE — 45385 COLONOSCOPY W/LESION REMOVAL: CPT | Mod: 33 | Performed by: INTERNAL MEDICINE

## 2025-08-18 RX ORDER — SODIUM CHLORIDE 9 MG/ML
INJECTION, SOLUTION INTRAVENOUS CONTINUOUS
Status: DISCONTINUED | OUTPATIENT
Start: 2025-08-18 | End: 2025-08-18 | Stop reason: HOSPADM

## 2025-08-18 RX ORDER — LIDOCAINE HYDROCHLORIDE 20 MG/ML
INJECTION INTRAVENOUS
Status: DISCONTINUED | OUTPATIENT
Start: 2025-08-18 | End: 2025-08-18

## 2025-08-18 RX ORDER — PROPOFOL 10 MG/ML
VIAL (ML) INTRAVENOUS
Status: DISCONTINUED | OUTPATIENT
Start: 2025-08-18 | End: 2025-08-18

## 2025-08-18 RX ADMIN — LIDOCAINE HYDROCHLORIDE 50 MG: 20 INJECTION INTRAVENOUS at 08:08

## 2025-08-18 RX ADMIN — PROPOFOL 150 MCG/KG/MIN: 10 INJECTION, EMULSION INTRAVENOUS at 08:08

## 2025-08-18 RX ADMIN — PROPOFOL 50 MG: 10 INJECTION, EMULSION INTRAVENOUS at 08:08

## 2025-08-18 RX ADMIN — GLYCOPYRROLATE 0.1 MG: 0.2 INJECTION, SOLUTION INTRAMUSCULAR; INTRAVENOUS at 08:08

## 2025-08-18 RX ADMIN — SODIUM CHLORIDE: 0.9 INJECTION, SOLUTION INTRAVENOUS at 08:08

## 2025-08-18 RX ADMIN — PROPOFOL 10 MG: 10 INJECTION, EMULSION INTRAVENOUS at 08:08

## 2025-08-19 ENCOUNTER — PATIENT MESSAGE (OUTPATIENT)
Dept: HEMATOLOGY/ONCOLOGY | Facility: CLINIC | Age: 60
End: 2025-08-19
Payer: COMMERCIAL

## 2025-08-19 ENCOUNTER — RESULTS FOLLOW-UP (OUTPATIENT)
Dept: GASTROENTEROLOGY | Facility: CLINIC | Age: 60
End: 2025-08-19
Payer: COMMERCIAL

## 2025-08-19 LAB
ESTROGEN SERPL-MCNC: NORMAL PG/ML
INSULIN SERPL-ACNC: NORMAL U[IU]/ML
LAB AP CLINICAL INFORMATION: NORMAL
LAB AP GROSS DESCRIPTION: NORMAL
LAB AP PERFORMING LOCATION(S): NORMAL
LAB AP REPORT FOOTNOTES: NORMAL

## 2025-09-05 ENCOUNTER — TELEPHONE (OUTPATIENT)
Dept: OBSTETRICS AND GYNECOLOGY | Facility: CLINIC | Age: 60
End: 2025-09-05
Payer: COMMERCIAL